# Patient Record
Sex: FEMALE | Race: WHITE | NOT HISPANIC OR LATINO | Employment: UNEMPLOYED | URBAN - METROPOLITAN AREA
[De-identification: names, ages, dates, MRNs, and addresses within clinical notes are randomized per-mention and may not be internally consistent; named-entity substitution may affect disease eponyms.]

---

## 2024-06-09 ENCOUNTER — HOSPITAL ENCOUNTER (INPATIENT)
Facility: MEDICAL CENTER | Age: 39
LOS: 4 days | DRG: 439 | End: 2024-06-13
Attending: STUDENT IN AN ORGANIZED HEALTH CARE EDUCATION/TRAINING PROGRAM | Admitting: STUDENT IN AN ORGANIZED HEALTH CARE EDUCATION/TRAINING PROGRAM
Payer: MEDICAID

## 2024-06-09 ENCOUNTER — DOCUMENTATION (OUTPATIENT)
Dept: HOSPITALIST | Facility: MEDICAL CENTER | Age: 39
End: 2024-06-09
Payer: MEDICAID

## 2024-06-09 DIAGNOSIS — R10.9 INTRACTABLE ABDOMINAL PAIN: ICD-10-CM

## 2024-06-09 PROBLEM — K82.8 GALLBLADDER SLUDGE: Status: ACTIVE | Noted: 2024-06-09

## 2024-06-09 PROBLEM — K81.0 ACUTE CHOLECYSTITIS: Status: RESOLVED | Noted: 2024-06-09 | Resolved: 2024-06-09

## 2024-06-09 PROBLEM — F10.10 ALCOHOL ABUSE: Status: ACTIVE | Noted: 2024-06-09

## 2024-06-09 PROBLEM — K81.0 ACUTE CHOLECYSTITIS: Status: ACTIVE | Noted: 2024-06-09

## 2024-06-09 PROBLEM — D69.6 THROMBOCYTOPENIA (HCC): Status: ACTIVE | Noted: 2024-06-09

## 2024-06-09 LAB
ALBUMIN SERPL BCP-MCNC: 3.2 G/DL (ref 3.2–4.9)
ALBUMIN/GLOB SERPL: 1 G/DL
ALP SERPL-CCNC: 182 U/L (ref 30–99)
ALT SERPL-CCNC: 36 U/L (ref 2–50)
ANION GAP SERPL CALC-SCNC: 16 MMOL/L (ref 7–16)
AST SERPL-CCNC: 269 U/L (ref 12–45)
BASOPHILS # BLD AUTO: 0.2 % (ref 0–1.8)
BASOPHILS # BLD: 0.01 K/UL (ref 0–0.12)
BILIRUB SERPL-MCNC: 1.6 MG/DL (ref 0.1–1.5)
BUN SERPL-MCNC: 4 MG/DL (ref 8–22)
CALCIUM ALBUM COR SERPL-MCNC: 9 MG/DL (ref 8.5–10.5)
CALCIUM SERPL-MCNC: 8.4 MG/DL (ref 8.5–10.5)
CHLORIDE SERPL-SCNC: 92 MMOL/L (ref 96–112)
CO2 SERPL-SCNC: 23 MMOL/L (ref 20–33)
CREAT SERPL-MCNC: 0.25 MG/DL (ref 0.5–1.4)
EOSINOPHIL # BLD AUTO: 0.02 K/UL (ref 0–0.51)
EOSINOPHIL NFR BLD: 0.5 % (ref 0–6.9)
ERYTHROCYTE [DISTWIDTH] IN BLOOD BY AUTOMATED COUNT: 48 FL (ref 35.9–50)
GFR SERPLBLD CREATININE-BSD FMLA CKD-EPI: 143 ML/MIN/1.73 M 2
GLOBULIN SER CALC-MCNC: 3.1 G/DL (ref 1.9–3.5)
GLUCOSE SERPL-MCNC: 139 MG/DL (ref 65–99)
HCT VFR BLD AUTO: 32.2 % (ref 37–47)
HGB BLD-MCNC: 10.8 G/DL (ref 12–16)
IMM GRANULOCYTES # BLD AUTO: 0.02 K/UL (ref 0–0.11)
IMM GRANULOCYTES NFR BLD AUTO: 0.5 % (ref 0–0.9)
LYMPHOCYTES # BLD AUTO: 0.56 K/UL (ref 1–4.8)
LYMPHOCYTES NFR BLD: 13 % (ref 22–41)
MAGNESIUM SERPL-MCNC: 1.1 MG/DL (ref 1.5–2.5)
MCH RBC QN AUTO: 33.3 PG (ref 27–33)
MCHC RBC AUTO-ENTMCNC: 33.5 G/DL (ref 32.2–35.5)
MCV RBC AUTO: 99.4 FL (ref 81.4–97.8)
MONOCYTES # BLD AUTO: 0.22 K/UL (ref 0–0.85)
MONOCYTES NFR BLD AUTO: 5.1 % (ref 0–13.4)
NEUTROPHILS # BLD AUTO: 3.49 K/UL (ref 1.82–7.42)
NEUTROPHILS NFR BLD: 80.7 % (ref 44–72)
NRBC # BLD AUTO: 0 K/UL
NRBC BLD-RTO: 0 /100 WBC (ref 0–0.2)
PLATELET # BLD AUTO: 19 K/UL (ref 164–446)
PLATELETS.RETICULATED NFR BLD AUTO: 13.2 % (ref 0.6–13.1)
PMV BLD AUTO: 11.8 FL (ref 9–12.9)
POTASSIUM SERPL-SCNC: 3.5 MMOL/L (ref 3.6–5.5)
PROCALCITONIN SERPL-MCNC: 0.29 NG/ML
PROT SERPL-MCNC: 6.3 G/DL (ref 6–8.2)
RBC # BLD AUTO: 3.24 M/UL (ref 4.2–5.4)
SODIUM SERPL-SCNC: 131 MMOL/L (ref 135–145)
WBC # BLD AUTO: 4.3 K/UL (ref 4.8–10.8)

## 2024-06-09 PROCEDURE — 87040 BLOOD CULTURE FOR BACTERIA: CPT

## 2024-06-09 PROCEDURE — 85025 COMPLETE CBC W/AUTO DIFF WBC: CPT

## 2024-06-09 PROCEDURE — 36415 COLL VENOUS BLD VENIPUNCTURE: CPT

## 2024-06-09 PROCEDURE — 83735 ASSAY OF MAGNESIUM: CPT

## 2024-06-09 PROCEDURE — 700105 HCHG RX REV CODE 258: Performed by: STUDENT IN AN ORGANIZED HEALTH CARE EDUCATION/TRAINING PROGRAM

## 2024-06-09 PROCEDURE — 85055 RETICULATED PLATELET ASSAY: CPT

## 2024-06-09 PROCEDURE — 84145 PROCALCITONIN (PCT): CPT

## 2024-06-09 PROCEDURE — 80053 COMPREHEN METABOLIC PANEL: CPT

## 2024-06-09 PROCEDURE — 770001 HCHG ROOM/CARE - MED/SURG/GYN PRIV*

## 2024-06-09 PROCEDURE — 99223 1ST HOSP IP/OBS HIGH 75: CPT | Performed by: STUDENT IN AN ORGANIZED HEALTH CARE EDUCATION/TRAINING PROGRAM

## 2024-06-09 PROCEDURE — 700111 HCHG RX REV CODE 636 W/ 250 OVERRIDE (IP): Performed by: STUDENT IN AN ORGANIZED HEALTH CARE EDUCATION/TRAINING PROGRAM

## 2024-06-09 RX ORDER — HYDROMORPHONE HYDROCHLORIDE 1 MG/ML
1 INJECTION, SOLUTION INTRAMUSCULAR; INTRAVENOUS; SUBCUTANEOUS EVERY 4 HOURS PRN
Status: DISCONTINUED | OUTPATIENT
Start: 2024-06-09 | End: 2024-06-10

## 2024-06-09 RX ORDER — SODIUM CHLORIDE 9 MG/ML
INJECTION, SOLUTION INTRAVENOUS CONTINUOUS
Status: DISCONTINUED | OUTPATIENT
Start: 2024-06-09 | End: 2024-06-09

## 2024-06-09 RX ORDER — ONDANSETRON 4 MG/1
4 TABLET, ORALLY DISINTEGRATING ORAL EVERY 4 HOURS PRN
Status: DISCONTINUED | OUTPATIENT
Start: 2024-06-09 | End: 2024-06-13 | Stop reason: HOSPADM

## 2024-06-09 RX ORDER — DIAZEPAM 5 MG/ML
10 INJECTION, SOLUTION INTRAMUSCULAR; INTRAVENOUS
Status: DISCONTINUED | OUTPATIENT
Start: 2024-06-09 | End: 2024-06-13 | Stop reason: HOSPADM

## 2024-06-09 RX ORDER — SODIUM CHLORIDE, SODIUM LACTATE, POTASSIUM CHLORIDE, CALCIUM CHLORIDE 600; 310; 30; 20 MG/100ML; MG/100ML; MG/100ML; MG/100ML
INJECTION, SOLUTION INTRAVENOUS CONTINUOUS
Status: DISCONTINUED | OUTPATIENT
Start: 2024-06-09 | End: 2024-06-13 | Stop reason: HOSPADM

## 2024-06-09 RX ORDER — ONDANSETRON 2 MG/ML
4 INJECTION INTRAMUSCULAR; INTRAVENOUS EVERY 4 HOURS PRN
Status: DISCONTINUED | OUTPATIENT
Start: 2024-06-09 | End: 2024-06-13 | Stop reason: HOSPADM

## 2024-06-09 RX ORDER — LORAZEPAM 1 MG/1
4 TABLET ORAL
Status: DISCONTINUED | OUTPATIENT
Start: 2024-06-09 | End: 2024-06-13 | Stop reason: HOSPADM

## 2024-06-09 RX ORDER — PROMETHAZINE HYDROCHLORIDE 25 MG/1
12.5-25 TABLET ORAL EVERY 4 HOURS PRN
Status: DISCONTINUED | OUTPATIENT
Start: 2024-06-09 | End: 2024-06-13 | Stop reason: HOSPADM

## 2024-06-09 RX ORDER — PROCHLORPERAZINE EDISYLATE 5 MG/ML
5-10 INJECTION INTRAMUSCULAR; INTRAVENOUS EVERY 4 HOURS PRN
Status: DISCONTINUED | OUTPATIENT
Start: 2024-06-09 | End: 2024-06-13 | Stop reason: HOSPADM

## 2024-06-09 RX ORDER — LORAZEPAM 0.5 MG/1
0.5 TABLET ORAL EVERY 4 HOURS PRN
Status: DISCONTINUED | OUTPATIENT
Start: 2024-06-09 | End: 2024-06-13 | Stop reason: HOSPADM

## 2024-06-09 RX ORDER — LORAZEPAM 1 MG/1
3 TABLET ORAL
Status: DISCONTINUED | OUTPATIENT
Start: 2024-06-09 | End: 2024-06-13 | Stop reason: HOSPADM

## 2024-06-09 RX ORDER — MORPHINE SULFATE 4 MG/ML
4 INJECTION INTRAVENOUS EVERY 4 HOURS PRN
Status: DISCONTINUED | OUTPATIENT
Start: 2024-06-09 | End: 2024-06-10

## 2024-06-09 RX ORDER — LORAZEPAM 1 MG/1
1 TABLET ORAL EVERY 4 HOURS PRN
Status: DISCONTINUED | OUTPATIENT
Start: 2024-06-09 | End: 2024-06-13 | Stop reason: HOSPADM

## 2024-06-09 RX ORDER — PROMETHAZINE HYDROCHLORIDE 25 MG/1
12.5-25 SUPPOSITORY RECTAL EVERY 4 HOURS PRN
Status: DISCONTINUED | OUTPATIENT
Start: 2024-06-09 | End: 2024-06-13 | Stop reason: HOSPADM

## 2024-06-09 RX ORDER — LORAZEPAM 1 MG/1
2 TABLET ORAL
Status: DISCONTINUED | OUTPATIENT
Start: 2024-06-09 | End: 2024-06-13 | Stop reason: HOSPADM

## 2024-06-09 RX ADMIN — ONDANSETRON 4 MG: 2 INJECTION INTRAMUSCULAR; INTRAVENOUS at 21:58

## 2024-06-09 RX ADMIN — SODIUM CHLORIDE, POTASSIUM CHLORIDE, SODIUM LACTATE AND CALCIUM CHLORIDE: 600; 310; 30; 20 INJECTION, SOLUTION INTRAVENOUS at 21:56

## 2024-06-09 RX ADMIN — MORPHINE SULFATE 4 MG: 4 INJECTION INTRAVENOUS at 21:56

## 2024-06-09 SDOH — ECONOMIC STABILITY: TRANSPORTATION INSECURITY
IN THE PAST 12 MONTHS, HAS LACK OF RELIABLE TRANSPORTATION KEPT YOU FROM MEDICAL APPOINTMENTS, MEETINGS, WORK OR FROM GETTING THINGS NEEDED FOR DAILY LIVING?: PATIENT DECLINED

## 2024-06-09 SDOH — ECONOMIC STABILITY: TRANSPORTATION INSECURITY
IN THE PAST 12 MONTHS, HAS THE LACK OF TRANSPORTATION KEPT YOU FROM MEDICAL APPOINTMENTS OR FROM GETTING MEDICATIONS?: PATIENT DECLINED

## 2024-06-09 ASSESSMENT — COGNITIVE AND FUNCTIONAL STATUS - GENERAL
CLIMB 3 TO 5 STEPS WITH RAILING: A LITTLE
SUGGESTED CMS G CODE MODIFIER MOBILITY: CJ
DRESSING REGULAR LOWER BODY CLOTHING: A LITTLE
HELP NEEDED FOR BATHING: A LITTLE
SUGGESTED CMS G CODE MODIFIER DAILY ACTIVITY: CJ
DAILY ACTIVITIY SCORE: 21
MOBILITY SCORE: 22
WALKING IN HOSPITAL ROOM: A LITTLE
TOILETING: A LITTLE

## 2024-06-09 ASSESSMENT — SOCIAL DETERMINANTS OF HEALTH (SDOH)
IN THE PAST 12 MONTHS, HAS THE ELECTRIC, GAS, OIL, OR WATER COMPANY THREATENED TO SHUT OFF SERVICE IN YOUR HOME?: PATIENT DECLINED
WITHIN THE LAST YEAR, HAVE YOU BEEN KICKED, HIT, SLAPPED, OR OTHERWISE PHYSICALLY HURT BY YOUR PARTNER OR EX-PARTNER?: PATIENT DECLINED
WITHIN THE LAST YEAR, HAVE YOU BEEN HUMILIATED OR EMOTIONALLY ABUSED IN OTHER WAYS BY YOUR PARTNER OR EX-PARTNER?: PATIENT DECLINED
WITHIN THE LAST YEAR, HAVE YOU BEEN AFRAID OF YOUR PARTNER OR EX-PARTNER?: PATIENT DECLINED
WITHIN THE LAST YEAR, HAVE TO BEEN RAPED OR FORCED TO HAVE ANY KIND OF SEXUAL ACTIVITY BY YOUR PARTNER OR EX-PARTNER?: PATIENT DECLINED
WITHIN THE PAST 12 MONTHS, THE FOOD YOU BOUGHT JUST DIDN'T LAST AND YOU DIDN'T HAVE MONEY TO GET MORE: PATIENT DECLINED
WITHIN THE PAST 12 MONTHS, YOU WORRIED THAT YOUR FOOD WOULD RUN OUT BEFORE YOU GOT THE MONEY TO BUY MORE: PATIENT DECLINED

## 2024-06-09 ASSESSMENT — LIFESTYLE VARIABLES
HOW MANY TIMES IN THE PAST YEAR HAVE YOU HAD 5 OR MORE DRINKS IN A DAY: 350
TOTAL SCORE: 2
AVERAGE NUMBER OF DAYS PER WEEK YOU HAVE A DRINK CONTAINING ALCOHOL: 7
NAUSEA AND VOMITING: MILD NAUSEA WITH NO VOMITING
TOTAL SCORE: 2
HEADACHE, FULLNESS IN HEAD: NOT PRESENT
TOTAL SCORE: 2
ALCOHOL_USE: YES
DOES PATIENT WANT TO STOP DRINKING: NO
EVER HAD A DRINK FIRST THING IN THE MORNING TO STEADY YOUR NERVES TO GET RID OF A HANGOVER: YES
PAROXYSMAL SWEATS: NO SWEAT VISIBLE
CONSUMPTION TOTAL: POSITIVE
HAVE PEOPLE ANNOYED YOU BY CRITICIZING YOUR DRINKING: NO
TREMOR: NO TREMOR
AGITATION: NORMAL ACTIVITY
ON A TYPICAL DAY WHEN YOU DRINK ALCOHOL HOW MANY DRINKS DO YOU HAVE: 4
TOTAL SCORE: 2
VISUAL DISTURBANCES: NOT PRESENT
HAVE YOU EVER FELT YOU SHOULD CUT DOWN ON YOUR DRINKING: NO
AUDITORY DISTURBANCES: NOT PRESENT
ORIENTATION AND CLOUDING OF SENSORIUM: ORIENTED AND CAN DO SERIAL ADDITIONS
EVER FELT BAD OR GUILTY ABOUT YOUR DRINKING: YES
ANXIETY: MILDLY ANXIOUS

## 2024-06-09 ASSESSMENT — ENCOUNTER SYMPTOMS
RESPIRATORY NEGATIVE: 1
EYES NEGATIVE: 1
NEUROLOGICAL NEGATIVE: 1
PSYCHIATRIC NEGATIVE: 1
VOMITING: 1
CONSTITUTIONAL NEGATIVE: 1
MUSCULOSKELETAL NEGATIVE: 1
ABDOMINAL PAIN: 1
NAUSEA: 1
CARDIOVASCULAR NEGATIVE: 1

## 2024-06-09 ASSESSMENT — PAIN DESCRIPTION - PAIN TYPE
TYPE: ACUTE PAIN
TYPE: ACUTE PAIN

## 2024-06-09 ASSESSMENT — PATIENT HEALTH QUESTIONNAIRE - PHQ9
SUM OF ALL RESPONSES TO PHQ9 QUESTIONS 1 AND 2: 0
1. LITTLE INTEREST OR PLEASURE IN DOING THINGS: NOT AT ALL
2. FEELING DOWN, DEPRESSED, IRRITABLE, OR HOPELESS: NOT AT ALL

## 2024-06-09 NOTE — PROGRESS NOTES
[facility] DIRECT ADMISSION REPORT    Transferring facility: [facility]    Transferring physician: Dr. Talamatnes    Chief complaint: Abdominal Pain    Pertinent history & patient course: 39F with chronic pancreatitis from EtOH presented with abdominal pain. Discussed with general surgery, recommended nonoperative management from and started ceftriaxone/flagyl. Well-known to physician.    Pertinent imaging & lab results: Lipase 572. Hgb 11.8, Plt 18K. Tbili 2.2, . CT A/P demonstrates distended gallbladder with wall thickening. Abdominal US CBD 5.4 mm, wall thickening 3.9, contains sludge.    Consultants called prior to transfer and pertinent input from consultants: None    Code Status: FULL per transferring provider, I personally verified with the transferring provider patient's code status and the transferring provider has confirmed this with the patient.    Reason for Transfer: Limited platelet transfusions, 1 unit not available until tomorrow, unable to do smears for pseudothrombocytopenia, may warrant IR placement for acalculous cholecystitis    Further work up or recommendations requested prior to transfer: None    Patient accepted for transfer: Yes    Accepting [facility] Facility: [facility] - Nursing to notify the Triage Coordinator in the RTOC via Voalte or Phone ext. [phone] when patient arrives to the unit. The Triage Coordinator will assign the admitting provider.    Consultants to be called upon arrival: None    Admission status: Inpatient.     Floor requested: GSU    If ICU transfer, name of intensivist case discussed with and pertinent input from critical care: N/A    The admitting provider is the point of contact for questions or concerns regarding patient's care.

## 2024-06-09 NOTE — PROGRESS NOTES
[facility] DIRECT ADMISSION REPORT    Transferring facility: [facility] [place], operated by [facility]    Transferring physician: Dr. Talamantes    Chief complaint: Abdominal Pain    Pertinent history & patient course: 39F with chronic pancreatitis from EtOH presented with abdominal pain. Discussed with general surgery, recommended nonoperative management from and started ceftriaxone/flagyl. Well-known to physician.    Pertinent imaging & lab results: Lipase 572. Hgb 11.8, Plt 18K. Tbili 2.2, . CT A/P demonstrates distended gallbladder with wall thickening. Abdominal US CBD 5.4 mm, wall thickening 3.9, contains sludge.    Consultants called prior to transfer and pertinent input from consultants: None    Code Status: FULL per transferring provider, I personally verified with the transferring provider patient's code status and the transferring provider has confirmed this with the patient.    Reason for Transfer: Limited platelet transfusions, 1 unit not available until tomorrow, unable to do smears for pseudothrombocytopenia, may warrant IR placement for acalculous cholecystitis    Further work up or recommendations requested prior to transfer: None    Patient accepted for transfer: Yes    Accepting [facility] Facility: [facility] - Nursing to notify the Triage Coordinator in the RTOC via Voalte or Phone ext. [phone] when patient arrives to the unit. The Triage Coordinator will assign the admitting provider.    Consultants to be called upon arrival: None    Admission status: Inpatient.     Floor requested: GSU    If ICU transfer, name of intensivist case discussed with and pertinent input from critical care: N/A    The admitting provider is the point of contact for questions or concerns regarding patient's care.    This encounter is for documentation purposes of the transfer request. A copy has been placed in the pending hospital encounter.

## 2024-06-10 ENCOUNTER — APPOINTMENT (OUTPATIENT)
Dept: RADIOLOGY | Facility: MEDICAL CENTER | Age: 39
DRG: 439 | End: 2024-06-10
Attending: STUDENT IN AN ORGANIZED HEALTH CARE EDUCATION/TRAINING PROGRAM
Payer: MEDICAID

## 2024-06-10 PROBLEM — F15.10 METHAMPHETAMINE ABUSE (HCC): Status: ACTIVE | Noted: 2024-06-10

## 2024-06-10 PROBLEM — F10.20 UNCOMPLICATED ALCOHOL DEPENDENCE (HCC): Status: ACTIVE | Noted: 2024-06-09

## 2024-06-10 PROBLEM — R10.9 INTRACTABLE ABDOMINAL PAIN: Status: ACTIVE | Noted: 2024-06-10

## 2024-06-10 LAB
ALBUMIN SERPL BCP-MCNC: 3.3 G/DL (ref 3.2–4.9)
ALBUMIN SERPL BCP-MCNC: 3.3 G/DL (ref 3.2–4.9)
ALBUMIN/GLOB SERPL: 1 G/DL
ALBUMIN/GLOB SERPL: 1.1 G/DL
ALP SERPL-CCNC: 169 U/L (ref 30–99)
ALP SERPL-CCNC: 170 U/L (ref 30–99)
ALT SERPL-CCNC: 35 U/L (ref 2–50)
ALT SERPL-CCNC: 37 U/L (ref 2–50)
ANION GAP SERPL CALC-SCNC: 17 MMOL/L (ref 7–16)
ANION GAP SERPL CALC-SCNC: 19 MMOL/L (ref 7–16)
AST SERPL-CCNC: 196 U/L (ref 12–45)
AST SERPL-CCNC: 201 U/L (ref 12–45)
BASOPHILS # BLD AUTO: 0.2 % (ref 0–1.8)
BASOPHILS # BLD: 0.01 K/UL (ref 0–0.12)
BILIRUB SERPL-MCNC: 1.2 MG/DL (ref 0.1–1.5)
BILIRUB SERPL-MCNC: 1.3 MG/DL (ref 0.1–1.5)
BUN SERPL-MCNC: 2 MG/DL (ref 8–22)
BUN SERPL-MCNC: 2 MG/DL (ref 8–22)
CALCIUM ALBUM COR SERPL-MCNC: 9 MG/DL (ref 8.5–10.5)
CALCIUM ALBUM COR SERPL-MCNC: 9 MG/DL (ref 8.5–10.5)
CALCIUM SERPL-MCNC: 8.4 MG/DL (ref 8.5–10.5)
CALCIUM SERPL-MCNC: 8.4 MG/DL (ref 8.5–10.5)
CHLORIDE SERPL-SCNC: 88 MMOL/L (ref 96–112)
CHLORIDE SERPL-SCNC: 89 MMOL/L (ref 96–112)
CO2 SERPL-SCNC: 23 MMOL/L (ref 20–33)
CO2 SERPL-SCNC: 25 MMOL/L (ref 20–33)
CREAT SERPL-MCNC: 0.2 MG/DL (ref 0.5–1.4)
CREAT SERPL-MCNC: 0.22 MG/DL (ref 0.5–1.4)
EOSINOPHIL # BLD AUTO: 0.04 K/UL (ref 0–0.51)
EOSINOPHIL NFR BLD: 0.9 % (ref 0–6.9)
ERYTHROCYTE [DISTWIDTH] IN BLOOD BY AUTOMATED COUNT: 45.2 FL (ref 35.9–50)
ERYTHROCYTE [DISTWIDTH] IN BLOOD BY AUTOMATED COUNT: 46 FL (ref 35.9–50)
GFR SERPLBLD CREATININE-BSD FMLA CKD-EPI: 147 ML/MIN/1.73 M 2
GFR SERPLBLD CREATININE-BSD FMLA CKD-EPI: 150 ML/MIN/1.73 M 2
GLOBULIN SER CALC-MCNC: 3 G/DL (ref 1.9–3.5)
GLOBULIN SER CALC-MCNC: 3.2 G/DL (ref 1.9–3.5)
GLUCOSE SERPL-MCNC: 114 MG/DL (ref 65–99)
GLUCOSE SERPL-MCNC: 142 MG/DL (ref 65–99)
HBV CORE AB SERPL QL IA: NONREACTIVE
HBV SURFACE AB SERPL IA-ACNC: <3.5 MIU/ML (ref 0–10)
HBV SURFACE AG SER QL: ABNORMAL
HCT VFR BLD AUTO: 31.5 % (ref 37–47)
HCT VFR BLD AUTO: 33.4 % (ref 37–47)
HCV AB SER QL: ABNORMAL
HGB BLD-MCNC: 11.2 G/DL (ref 12–16)
HGB BLD-MCNC: 11.5 G/DL (ref 12–16)
HIV 1+2 AB+HIV1 P24 AG SERPL QL IA: ABNORMAL
IMM GRANULOCYTES # BLD AUTO: 0.02 K/UL (ref 0–0.11)
IMM GRANULOCYTES NFR BLD AUTO: 0.4 % (ref 0–0.9)
LIPASE SERPL-CCNC: 154 U/L (ref 11–82)
LYMPHOCYTES # BLD AUTO: 0.89 K/UL (ref 1–4.8)
LYMPHOCYTES NFR BLD: 19.2 % (ref 22–41)
MCH RBC QN AUTO: 33 PG (ref 27–33)
MCH RBC QN AUTO: 33.9 PG (ref 27–33)
MCHC RBC AUTO-ENTMCNC: 34.4 G/DL (ref 32.2–35.5)
MCHC RBC AUTO-ENTMCNC: 35.6 G/DL (ref 32.2–35.5)
MCV RBC AUTO: 95.5 FL (ref 81.4–97.8)
MCV RBC AUTO: 96 FL (ref 81.4–97.8)
MONOCYTES # BLD AUTO: 0.32 K/UL (ref 0–0.85)
MONOCYTES NFR BLD AUTO: 6.9 % (ref 0–13.4)
NEUTROPHILS # BLD AUTO: 3.35 K/UL (ref 1.82–7.42)
NEUTROPHILS NFR BLD: 72.4 % (ref 44–72)
NRBC # BLD AUTO: 0 K/UL
NRBC BLD-RTO: 0 /100 WBC (ref 0–0.2)
PLATELET # BLD AUTO: 21 K/UL (ref 164–446)
PLATELET # BLD AUTO: 23 K/UL (ref 164–446)
PLATELETS.RETICULATED NFR BLD AUTO: 15.9 % (ref 0.6–13.1)
PLATELETS.RETICULATED NFR BLD AUTO: 16.4 % (ref 0.6–13.1)
PMV BLD AUTO: 11.3 FL (ref 9–12.9)
PMV BLD AUTO: 12 FL (ref 9–12.9)
POTASSIUM SERPL-SCNC: 3.3 MMOL/L (ref 3.6–5.5)
POTASSIUM SERPL-SCNC: 3.8 MMOL/L (ref 3.6–5.5)
PROT SERPL-MCNC: 6.3 G/DL (ref 6–8.2)
PROT SERPL-MCNC: 6.5 G/DL (ref 6–8.2)
RBC # BLD AUTO: 3.3 M/UL (ref 4.2–5.4)
RBC # BLD AUTO: 3.48 M/UL (ref 4.2–5.4)
SODIUM SERPL-SCNC: 130 MMOL/L (ref 135–145)
SODIUM SERPL-SCNC: 131 MMOL/L (ref 135–145)
WBC # BLD AUTO: 4.6 K/UL (ref 4.8–10.8)
WBC # BLD AUTO: 5.2 K/UL (ref 4.8–10.8)

## 2024-06-10 PROCEDURE — 99233 SBSQ HOSP IP/OBS HIGH 50: CPT | Performed by: STUDENT IN AN ORGANIZED HEALTH CARE EDUCATION/TRAINING PROGRAM

## 2024-06-10 PROCEDURE — 700102 HCHG RX REV CODE 250 W/ 637 OVERRIDE(OP): Performed by: STUDENT IN AN ORGANIZED HEALTH CARE EDUCATION/TRAINING PROGRAM

## 2024-06-10 PROCEDURE — 86803 HEPATITIS C AB TEST: CPT

## 2024-06-10 PROCEDURE — 700111 HCHG RX REV CODE 636 W/ 250 OVERRIDE (IP): Performed by: STUDENT IN AN ORGANIZED HEALTH CARE EDUCATION/TRAINING PROGRAM

## 2024-06-10 PROCEDURE — 86704 HEP B CORE ANTIBODY TOTAL: CPT

## 2024-06-10 PROCEDURE — 80053 COMPREHEN METABOLIC PANEL: CPT

## 2024-06-10 PROCEDURE — 770001 HCHG ROOM/CARE - MED/SURG/GYN PRIV*

## 2024-06-10 PROCEDURE — 86706 HEP B SURFACE ANTIBODY: CPT

## 2024-06-10 PROCEDURE — 85025 COMPLETE CBC W/AUTO DIFF WBC: CPT

## 2024-06-10 PROCEDURE — 85055 RETICULATED PLATELET ASSAY: CPT

## 2024-06-10 PROCEDURE — A9270 NON-COVERED ITEM OR SERVICE: HCPCS | Performed by: STUDENT IN AN ORGANIZED HEALTH CARE EDUCATION/TRAINING PROGRAM

## 2024-06-10 PROCEDURE — 87389 HIV-1 AG W/HIV-1&-2 AB AG IA: CPT

## 2024-06-10 PROCEDURE — 36415 COLL VENOUS BLD VENIPUNCTURE: CPT

## 2024-06-10 PROCEDURE — 85027 COMPLETE CBC AUTOMATED: CPT

## 2024-06-10 PROCEDURE — 83690 ASSAY OF LIPASE: CPT

## 2024-06-10 PROCEDURE — 87340 HEPATITIS B SURFACE AG IA: CPT

## 2024-06-10 RX ORDER — OXYCODONE HYDROCHLORIDE 10 MG/1
10 TABLET ORAL EVERY 6 HOURS
Status: DISCONTINUED | OUTPATIENT
Start: 2024-06-10 | End: 2024-06-10

## 2024-06-10 RX ORDER — ACETAMINOPHEN 500 MG
1000 TABLET ORAL EVERY 6 HOURS
Status: DISCONTINUED | OUTPATIENT
Start: 2024-06-10 | End: 2024-06-13 | Stop reason: HOSPADM

## 2024-06-10 RX ORDER — ENOXAPARIN SODIUM 100 MG/ML
40 INJECTION SUBCUTANEOUS DAILY
Status: DISCONTINUED | OUTPATIENT
Start: 2024-06-10 | End: 2024-06-10

## 2024-06-10 RX ORDER — MAGNESIUM SULFATE HEPTAHYDRATE 40 MG/ML
4 INJECTION, SOLUTION INTRAVENOUS ONCE
Status: COMPLETED | OUTPATIENT
Start: 2024-06-10 | End: 2024-06-10

## 2024-06-10 RX ORDER — OXYCODONE HYDROCHLORIDE 10 MG/1
10 TABLET ORAL EVERY 4 HOURS PRN
Status: DISCONTINUED | OUTPATIENT
Start: 2024-06-10 | End: 2024-06-13 | Stop reason: HOSPADM

## 2024-06-10 RX ORDER — HYDROMORPHONE HYDROCHLORIDE 1 MG/ML
1 INJECTION, SOLUTION INTRAMUSCULAR; INTRAVENOUS; SUBCUTANEOUS EVERY 4 HOURS PRN
Status: DISCONTINUED | OUTPATIENT
Start: 2024-06-10 | End: 2024-06-11

## 2024-06-10 RX ORDER — IBUPROFEN 200 MG
200 TABLET ORAL EVERY 8 HOURS PRN
Status: ON HOLD | COMMUNITY
End: 2024-06-12

## 2024-06-10 RX ORDER — POTASSIUM CHLORIDE 20 MEQ/1
40 TABLET, EXTENDED RELEASE ORAL EVERY 6 HOURS
Status: COMPLETED | OUTPATIENT
Start: 2024-06-10 | End: 2024-06-10

## 2024-06-10 RX ADMIN — HYDROMORPHONE HYDROCHLORIDE 1 MG: 1 INJECTION, SOLUTION INTRAMUSCULAR; INTRAVENOUS; SUBCUTANEOUS at 00:06

## 2024-06-10 RX ADMIN — OXYCODONE HYDROCHLORIDE 10 MG: 10 TABLET ORAL at 22:32

## 2024-06-10 RX ADMIN — POTASSIUM CHLORIDE 40 MEQ: 1500 TABLET, EXTENDED RELEASE ORAL at 08:56

## 2024-06-10 RX ADMIN — ONDANSETRON 4 MG: 2 INJECTION INTRAMUSCULAR; INTRAVENOUS at 14:39

## 2024-06-10 RX ADMIN — ACETAMINOPHEN 1000 MG: 500 TABLET, FILM COATED ORAL at 23:08

## 2024-06-10 RX ADMIN — ONDANSETRON 4 MG: 4 TABLET, ORALLY DISINTEGRATING ORAL at 23:08

## 2024-06-10 RX ADMIN — OXYCODONE HYDROCHLORIDE 10 MG: 10 TABLET ORAL at 14:34

## 2024-06-10 RX ADMIN — HYDROMORPHONE HYDROCHLORIDE 1 MG: 1 INJECTION, SOLUTION INTRAMUSCULAR; INTRAVENOUS; SUBCUTANEOUS at 05:19

## 2024-06-10 RX ADMIN — MORPHINE SULFATE 4 MG: 4 INJECTION INTRAVENOUS at 03:27

## 2024-06-10 RX ADMIN — OXYCODONE HYDROCHLORIDE 10 MG: 10 TABLET ORAL at 18:39

## 2024-06-10 RX ADMIN — POTASSIUM CHLORIDE 40 MEQ: 1500 TABLET, EXTENDED RELEASE ORAL at 14:34

## 2024-06-10 RX ADMIN — MAGNESIUM SULFATE HEPTAHYDRATE 4 G: 4 INJECTION, SOLUTION INTRAVENOUS at 11:40

## 2024-06-10 ASSESSMENT — LIFESTYLE VARIABLES
TOTAL SCORE: 1
AGITATION: NORMAL ACTIVITY
TOTAL SCORE: 2
AUDITORY DISTURBANCES: NOT PRESENT
VISUAL DISTURBANCES: NOT PRESENT
AGITATION: NORMAL ACTIVITY
NAUSEA AND VOMITING: MILD NAUSEA WITH NO VOMITING
AGITATION: NORMAL ACTIVITY
ORIENTATION AND CLOUDING OF SENSORIUM: ORIENTED AND CAN DO SERIAL ADDITIONS
AGITATION: NORMAL ACTIVITY
TREMOR: NO TREMOR
ORIENTATION AND CLOUDING OF SENSORIUM: ORIENTED AND CAN DO SERIAL ADDITIONS
TOTAL SCORE: 4
VISUAL DISTURBANCES: NOT PRESENT
AUDITORY DISTURBANCES: NOT PRESENT
HEADACHE, FULLNESS IN HEAD: NOT PRESENT
TREMOR: TREMOR NOT VISIBLE BUT CAN BE FELT, FINGERTIP TO FINGERTIP
PAROXYSMAL SWEATS: NO SWEAT VISIBLE
TREMOR: TREMOR NOT VISIBLE BUT CAN BE FELT, FINGERTIP TO FINGERTIP
TREMOR: TREMOR NOT VISIBLE BUT CAN BE FELT, FINGERTIP TO FINGERTIP
PAROXYSMAL SWEATS: NO SWEAT VISIBLE
ORIENTATION AND CLOUDING OF SENSORIUM: ORIENTED AND CAN DO SERIAL ADDITIONS
ANXIETY: MILDLY ANXIOUS
ANXIETY: MILDLY ANXIOUS
NAUSEA AND VOMITING: MILD NAUSEA WITH NO VOMITING
AUDITORY DISTURBANCES: NOT PRESENT
VISUAL DISTURBANCES: NOT PRESENT
ORIENTATION AND CLOUDING OF SENSORIUM: ORIENTED AND CAN DO SERIAL ADDITIONS
HEADACHE, FULLNESS IN HEAD: NOT PRESENT
TOTAL SCORE: 4
HEADACHE, FULLNESS IN HEAD: VERY MILD
TOTAL SCORE: 4
PAROXYSMAL SWEATS: NO SWEAT VISIBLE
PAROXYSMAL SWEATS: BARELY PERCEPTIBLE SWEATING. PALMS MOIST
PAROXYSMAL SWEATS: NO SWEAT VISIBLE
NAUSEA AND VOMITING: NO NAUSEA AND NO VOMITING
NAUSEA AND VOMITING: NO NAUSEA AND NO VOMITING
TREMOR: TREMOR NOT VISIBLE BUT CAN BE FELT, FINGERTIP TO FINGERTIP
ANXIETY: MILDLY ANXIOUS
AUDITORY DISTURBANCES: NOT PRESENT
HEADACHE, FULLNESS IN HEAD: VERY MILD
VISUAL DISTURBANCES: NOT PRESENT
NAUSEA AND VOMITING: NO NAUSEA AND NO VOMITING
AGITATION: NORMAL ACTIVITY
ORIENTATION AND CLOUDING OF SENSORIUM: ORIENTED AND CAN DO SERIAL ADDITIONS
ANXIETY: MILDLY ANXIOUS
HEADACHE, FULLNESS IN HEAD: VERY MILD
VISUAL DISTURBANCES: NOT PRESENT
AUDITORY DISTURBANCES: NOT PRESENT
ANXIETY: MILDLY ANXIOUS

## 2024-06-10 ASSESSMENT — ENCOUNTER SYMPTOMS
DIZZINESS: 0
ABDOMINAL PAIN: 1
NAUSEA: 0
CHILLS: 0
VOMITING: 0
CONSTIPATION: 0
COUGH: 0
PALPITATIONS: 0
MYALGIAS: 0
FEVER: 0
DIARRHEA: 0
HEADACHES: 0
SHORTNESS OF BREATH: 0

## 2024-06-10 ASSESSMENT — PAIN DESCRIPTION - PAIN TYPE
TYPE: ACUTE PAIN

## 2024-06-10 NOTE — PROGRESS NOTES
Medication history reviewed with PT at bedside    [facility] is complete per PT reporting    Allergies reviewed.     Patient denies any outpatient antibiotics in the last 30 days.     Patient is not taking anticoagulants.    Preferred pharmacy for this visit - [facility] in [place] ([phone])

## 2024-06-10 NOTE — ASSESSMENT & PLAN NOTE
Drinks half a pint of liquor a day, last alcoholic beverage about 3 days ago  [facility] protocol   cessation.  Referred back to Alcoholics Anonymous.

## 2024-06-10 NOTE — ASSESSMENT & PLAN NOTE
With abdominal pain and elevated lipase 154  Continue advance diet as tolerated, IV fluid  Pain control  Labs daily  Worsening elevated liver enzymes and bilirubin, will order CT scan  Order ultrasound for gallbladder and evaluate for any stones.

## 2024-06-10 NOTE — PROGRESS NOTES
Virtual Nurse rounding complete.    Round Needs: Other: patient requesting to have HIDA scan sooner than 1300. Education provided about imaging schedule and availability and Notified of Patient Needs: primary RN.

## 2024-06-10 NOTE — CARE PLAN
The patient is Watcher - Medium risk of patient condition declining or worsening    Shift Goals  Clinical Goals: pain mgmt  Patient Goals: pain mgmt    Progress made toward(s) clinical / shift goals:    Problem: Knowledge Deficit - Standard  Goal: Patient and family/care givers will demonstrate understanding of plan of care, disease process/condition, diagnostic tests and medications  Outcome: Progressing  Educate patient on plan of care and pending HIDA scan. Encouraged pt to ask questions.

## 2024-06-10 NOTE — H&P
Hospital Medicine History & Physical Note    Date of Service  6/9/2024    Primary Care Physician  Christian German D.O.    Consultants  None    Code Status  Full Code    Chief Complaint  Pancreatitis    History of Presenting Illness  Brenda Cartagena is a 39 y.o. female who presented 6/9/2024 with abdominal pain.  Patient has history of heavy alcohol use and frequent methamphetamine use.  She has been drinking half a pint of vodka every day.  She last used methamphetamine 1 week ago and last drank alcohol 3 days ago.  About 2 days ago, patient began to have a squeezing epigastric abdominal pain associate with nausea and multiple episodes of vomiting.  She denies diarrhea fever chills.    At outside facility:  White blood cell count 4.1 hemoglobin 11 platelet 12   ALT 60 creatinine 0.6  Alcohol level 210  Throughout hospital course, hemoglobin has been trending down 11.8 -> 10 -> 9.5  Right upper quadrant ultrasound showing distended gallbladder with a large amount of sludge.  The wall of the gallbladder is mildly prominent at 3.9 mm and early acute cholecystitis cannot be excluded.  U tox positive for amphetamines  CTAP showing fatty, enlarged liver    I discussed the plan of care with patient.    Review of Systems  Review of Systems   Constitutional: Negative.    HENT: Negative.     Eyes: Negative.    Respiratory: Negative.     Cardiovascular: Negative.    Gastrointestinal:  Positive for abdominal pain, nausea and vomiting.   Genitourinary: Negative.    Musculoskeletal: Negative.    Skin: Negative.    Neurological: Negative.    Psychiatric/Behavioral: Negative.         Past Medical History   has a past medical history of Indigestion (13 yrears).    Surgical History   has a past surgical history that includes incision and drainage orthopedic (5/9/2012); external fixator application (5/9/2012); tibia nailing intramedullary (5/11/2012); external fixator removal (5/11/2012); fasciotomy (5/13/2012); wound closure  delayed (5/16/2012); irrigation & debridement ortho (5/18/2012); wound closure ortho (5/18/2012); orif, fracture, tibia (9/5/2012); and tonsillectomy (Bilateral, 7/21/2015).     Family History  family history is not on file.   Family history reviewed with patient. There is no family history that is pertinent to the chief complaint.     Social History   reports that she has been smoking cigarettes. She has a 1 pack-year smoking history. She does not have any smokeless tobacco history on file. She reports current alcohol use. She reports current drug use. Drug: Inhaled.    Allergies  Allergies   Allergen Reactions    Peanut-Derived Swelling       Medications  Prior to Admission Medications   Prescriptions Last Dose Informant Patient Reported? Taking?   ondansetron (ZOFRAN) 4 MG TABS   No No   Sig: Take 1 Tab by mouth every four hours as needed for Nausea/Vomiting.   oxycodone-acetaminophen (PERCOCET) 5-325 MG TABS   No No   Sig: Take 1-2 Tabs by mouth every 6 hours as needed.      Facility-Administered Medications: None       Physical Exam  Temp:  [36.8 °C (98.2 °F)] 36.8 °C (98.2 °F)  Pulse:  [97] 97  Resp:  [18] 18  BP: (164)/(104) 164/104  SpO2:  [100 %] 100 %  Blood Pressure: (!) 164/104   Temperature: 36.8 °C (98.2 °F)   Pulse: 97   Respiration: 18   Pulse Oximetry: 100 %       Physical Exam  Constitutional:       Appearance: Normal appearance. She is normal weight.   HENT:      Head: Normocephalic.      Nose: Nose normal.      Mouth/Throat:      Mouth: Mucous membranes are moist.   Eyes:      Pupils: Pupils are equal, round, and reactive to light.   Cardiovascular:      Rate and Rhythm: Normal rate and regular rhythm.      Pulses: Normal pulses.   Pulmonary:      Effort: Pulmonary effort is normal.      Breath sounds: Normal breath sounds.   Abdominal:      General: Abdomen is flat. Bowel sounds are normal.      Palpations: Abdomen is soft.      Comments: Patient's pain upon palpation of epigastric area  Ignacio  "sign negative   Musculoskeletal:      Cervical back: Neck supple.   Neurological:      Mental Status: She is alert.         Laboratory:          No results for input(s): \"ALTSGPT\", \"ASTSGOT\", \"ALKPHOSPHAT\", \"TBILIRUBIN\", \"DBILIRUBIN\", \"GAMMAGT\", \"AMYLASE\", \"LIPASE\", \"ALB\", \"PREALBUMIN\", \"GLUCOSE\" in the last 72 hours.      No results for input(s): \"NTPROBNP\" in the last 72 hours.      No results for input(s): \"TROPONINT\" in the last 72 hours.    Imaging:  No orders to display       no X-Ray or EKG requiring interpretation    Assessment/Plan:  Justification for Admission Status  I anticipate this patient will require at least two midnights for appropriate medical management, necessitating inpatient admission because patient has pancreatitis    Patient will need a Med/Surg bed on MEDICAL service .  The need is secondary to pancreatitis.    * Acute pancreatitis  Assessment & Plan  Likely from alcohol abuse  Fluids  Morphine as needed  Serial abdominal exam        Alcohol abuse  Assessment & Plan  Drinks half a pint of liquor a day, last alcoholic beverage about 3 days ago  [facility] protocol    Gallbladder sludge  Assessment & Plan  Right upper quadrant ultrasound at outside facility showing distended gallbladder with a large amount of sludge.  The wall of the gallbladder is mildly prominent at 3.9 mm and early acute cholecystitis cannot be excluded.  General surgery contacted outside facility, recommended conservative management for now  Clinically patient has a negative Ignacio sign  HIDA scan ordered    Thrombocytopenia (HCC)  Assessment & Plan  Platelet 12 at outside facility. Suspect from bone marrow suppression  Repeat CBC            VTE prophylaxis: pharmacologic prophylaxis contraindicated due to thrombocytopenia  "

## 2024-06-10 NOTE — ASSESSMENT & PLAN NOTE
HIDA scan was negative for any acute cholecystitis  Elevated liver enzymes due to alcoholism.  Check ultrasound for any gallbladder stones  On 6/12, labs showed worsening elevated liver enzymes and bilirubin, will order images to rule out any obstruction.

## 2024-06-10 NOTE — ASSESSMENT & PLAN NOTE
As per history.   cessation.  Urine drug screen was positive  Discussed the risk of heart failure and death with the patient, she understood.

## 2024-06-10 NOTE — PROGRESS NOTES
4 Eyes Skin Assessment Completed by CARLINE Schuster and CARLINE Holguin.    Head WDL  Ears WDL  Nose WDL  Mouth WDL  Neck WDL  Breast/Chest WDL  Shoulder Blades WDL  Spine WDL  (R) Arm/Elbow/Hand WDL  (L) Arm/Elbow/Hand WDL  Abdomen WDL  Groin WDL  Scrotum/Coccyx/Buttocks WDL  (R) Leg WDL  (L) Leg WDL  (R) Heel/Foot/Toe WDL  (L) Heel/Foot/Toe WDL          Devices In Places Pulse Ox      Interventions In Place Pillows    Possible Skin Injury No    Pictures Uploaded Into Epic N/A  Wound Consult Placed N/A  RN Wound Prevention Protocol Ordered No

## 2024-06-10 NOTE — HOSPITAL COURSE
Brenda Cartagena is a 39 y.o. female who presented 6/9/2024 with abdominal pain.  Patient has history of heavy alcohol use and frequent methamphetamine use.  She has been drinking half a pint of vodka every day.  She last used methamphetamine 1 week ago and last drank alcohol 3 days ago.  About 2 days ago, patient began to have a squeezing epigastric abdominal pain associate with nausea and multiple episodes of vomiting.  She denies diarrhea fever chills.     At outside facility:  White blood cell count 4.1 hemoglobin 11 platelet 12   ALT 60 creatinine 0.6  Alcohol level 210  Throughout hospital course, hemoglobin has been trending down 11.8 -> 10 -> 9.5  Right upper quadrant ultrasound showing distended gallbladder with a large amount of sludge.  The wall of the gallbladder is mildly prominent at 3.9 mm and early acute cholecystitis cannot be excluded.  U tox positive for amphetamines  CTAP showing fatty, enlarged liver

## 2024-06-10 NOTE — PROGRESS NOTES
Patient arrived to floor via gurney with careflight.    Report received from Pamela CROWLEY, assumed Care.   Patient is AOx4, responds appropriately.      Pain controlled at this time.  Patient is currently NPO with sips, reports slight nausea - medicated per MAR. Up stand by assist with steady gait.    Plan of care discussed, all questions answered.    Educated on use of call light and importance of calling before getting out of bed. Pt verbalizes understanding.    Call light and belongings within reach, treaded slipper socks on, bed alarm in use, bed in lowest locked position.  All needs met at this time.

## 2024-06-10 NOTE — ASSESSMENT & PLAN NOTE
Due to pancreatitis and gastritis  IV fluids advance diet as tolerated  Omeprazole  Pain control, avoid aggressive IV pain control.

## 2024-06-10 NOTE — ASSESSMENT & PLAN NOTE
Likely related to drinking alcohol  Check B12 and TSH  Patient has pancytopenia, no signs of bleeding  Close monitoring and consider hematology consult if needed

## 2024-06-10 NOTE — PROGRESS NOTES
Hospital Medicine Daily Progress Note    Date of Service  6/10/2024    Chief Complaint  Brenda Cartagena is a 39 y.o. female admitted 6/9/2024 with abdominal pain.    Hospital Course  Brenda Cartagena is a 39 y.o. female who presented 6/9/2024 with abdominal pain.  Patient has history of heavy alcohol use and frequent methamphetamine use.  She has been drinking half a pint of vodka every day.  She last used methamphetamine 1 week ago and last drank alcohol 3 days ago.  About 2 days ago, patient began to have a squeezing epigastric abdominal pain associate with nausea and multiple episodes of vomiting.  She denies diarrhea fever chills.     At outside facility:  White blood cell count 4.1 hemoglobin 11 platelet 12   ALT 60 creatinine 0.6  Alcohol level 210  Throughout hospital course, hemoglobin has been trending down 11.8 -> 10 -> 9.5  Right upper quadrant ultrasound showing distended gallbladder with a large amount of sludge.  The wall of the gallbladder is mildly prominent at 3.9 mm and early acute cholecystitis cannot be excluded.  U tox positive for amphetamines  CTAP showing fatty, enlarged liver    Interval Problem Update  6/10/2024  Patient was seen and examined on G[facility] floor.  CIWA score 1-2.  Procalcitonin 0.29.  White count of 5.2.  HIDA scan negative for cholecystitis or cystic duct obstruction.  Antibiotics have been discontinued.  Severe abdominal pain requiring IV Dilaudid.  Advance to clear liquid diet.  IV fluids LR at 125 mL/h.      I have discussed this patient's plan of care and discharge plan at IDT rounds today with Case Management, Nursing, Nursing leadership, and other members of the IDT team.    Consultants/Specialty  none    Code Status  Full Code    Disposition  The patient is not medically cleared for discharge to home or a post-acute facility.  Anticipate discharge to: home with close outpatient follow-up    I have placed the appropriate orders for post-discharge  needs.    Review of Systems  Review of Systems   Constitutional:  Positive for malaise/fatigue. Negative for chills and fever.   Respiratory:  Negative for cough and shortness of breath.    Cardiovascular:  Negative for chest pain and palpitations.   Gastrointestinal:  Positive for abdominal pain. Negative for constipation, diarrhea, nausea and vomiting.   Musculoskeletal:  Negative for joint pain and myalgias.   Neurological:  Negative for dizziness and headaches.        Physical Exam  Temp:  [36.4 °C (97.5 °F)-37.1 °C (98.8 °F)] 36.4 °C (97.5 °F)  Pulse:  [] 93  Resp:  [18-20] 18  BP: (123-164)/() 123/86  SpO2:  [90 %-100 %] 97 %    Physical Exam  Vitals and nursing note reviewed.   Constitutional:       Appearance: She is normal weight. She is ill-appearing. She is not diaphoretic.   HENT:      Head: Normocephalic and atraumatic.      Mouth/Throat:      Mouth: Mucous membranes are moist.      Pharynx: Oropharynx is clear. No oropharyngeal exudate.   Eyes:      General:         Right eye: No discharge.         Left eye: No discharge.      Conjunctiva/sclera: Conjunctivae normal.      Pupils: Pupils are equal, round, and reactive to light.   Cardiovascular:      Rate and Rhythm: Normal rate and regular rhythm.      Pulses: Normal pulses.      Heart sounds: Normal heart sounds. No murmur heard.  Pulmonary:      Effort: Pulmonary effort is normal. No respiratory distress.      Breath sounds: Normal breath sounds.   Abdominal:      General: Abdomen is flat. Bowel sounds are normal. There is no distension.      Palpations: Abdomen is soft.      Tenderness: There is abdominal tenderness. There is no guarding or rebound.   Musculoskeletal:      Cervical back: Neck supple. No tenderness.      Right lower leg: No edema.      Left lower leg: No edema.   Skin:     Coloration: Skin is pale.   Neurological:      Mental Status: She is alert and oriented to person, place, and time.      Motor: No weakness.    Psychiatric:         Thought Content: Thought content normal.         Judgment: Judgment normal.         Fluids    Intake/Output Summary (Last 24 hours) at 6/10/2024 1622  Last data filed at 6/9/2024 2206  Gross per 24 hour   Intake 0 ml   Output 0 ml   Net 0 ml       Laboratory  Recent Labs     06/09/24  2227 06/10/24  0814 06/10/24  1112   WBC 4.3* 4.6* 5.2   RBC 3.24* 3.48* 3.30*   HEMOGLOBIN 10.8* 11.5* 11.2*   HEMATOCRIT 32.2* 33.4* 31.5*   MCV 99.4* 96.0 95.5   MCH 33.3* 33.0 33.9*   MCHC 33.5 34.4 35.6*   RDW 48.0 46.0 45.2   PLATELETCT 19* 21* 23*   MPV 11.8 11.3 12.0     Recent Labs     06/09/24  2227 06/10/24  0814 06/10/24  1112   SODIUM 131* 131* 130*   POTASSIUM 3.5* 3.3* 3.8   CHLORIDE 92* 89* 88*   CO2 23 23 25   GLUCOSE 139* 114* 142*   BUN 4* 2* 2*   CREATININE 0.25* 0.22* 0.20*   CALCIUM 8.4* 8.4* 8.4*                   Imaging  NM-BILIARY (HIDA) SCAN WITH CCK   Final Result      1.  No evidence of cystic duct obstruction   2.  Reduced gallbladder ejection fraction of uncertain clinical significance as some gallbladder emptying is evident even prior to CCK administration. Normal gallbladder ejection fraction is 38% or greater           Assessment/Plan  * Alcohol-induced acute pancreatitis without infection or necrosis  Assessment & Plan  Likely from alcohol abuse  Fluids  Morphine as needed  Serial abdominal exam    6/10/2024  Prior episodes of pancreatitis per chart review.  Lipase elevated 154.  White count within normal limits.  Procalcitonin only mildly elevated.  Continue to trend.  Significant abdominal pain requiring IV Dilaudid.  Continue IV fluids LR at 125 mL/h  Advance to clear liquid diet      Intractable abdominal pain- (present on admission)  Assessment & Plan  6/10/2024  Secondary to acute pancreatitis.  Scheduled Tylenol 1 g every 6 hours.  Oxycodone 10 mg every 4 hours as needed  IV Dilaudid 1 mg IV every 4 hours as needed for breakthrough pain  Continuous pulse oximetry  monitoring to monitor for hypoxia and respiratory depression while receiving IV narcotic medications.    Gallbladder sludge  Assessment & Plan  Right upper quadrant ultrasound at outside facility showing distended gallbladder with a large amount of sludge.  The wall of the gallbladder is mildly prominent at 3.9 mm and early acute cholecystitis cannot be excluded.  General surgery contacted outside facility, recommended conservative management for now  Clinically patient has a negative Ignacio sign  HIDA scan ordered    6/10/2024  TECHNIQUE/EXAM DESCRIPTION AND NUMBER OF VIEWS:  5.68 mCi Tc 99-Choletec was administered intravenously, followed by 60 minutes of anterior planar imaging. 1.052 micrograms CCK was then infused over 30 minutes, and anterior planar imaging was performed.  A gallbladder ejection fraction was then   calculated.     COMPARISON: None     FINDINGS:  There is prompt uptake of tracer in the hepatic parenchyma.  The gallbladder fills with activity within 60 minutes and activity is excreted into the small bowel which is evident even before CCK administration.     The gallbladder ejection fraction is 1%%.     IMPRESSION:     1.  No evidence of cystic duct obstruction  2.  Reduced gallbladder ejection fraction of uncertain clinical significance as some gallbladder emptying is evident even prior to CCK administration. Normal gallbladder ejection fraction is 38% or greater    No signs of cholecystitis.  Hold off further antibiotics.    Methamphetamine abuse (HCC)- (present on admission)  Assessment & Plan  6/10/2024  As per history.   cessation.    Uncomplicated alcohol dependence (HCC)  Assessment & Plan  Drinks half a pint of liquor a day, last alcoholic beverage about 3 days ago  CIWA protocol    6/10/2024  CIWA score 1-2.   cessation.  Referred back to Alcoholics Anonymous.    Thrombocytopenia (HCC)  Assessment & Plan  Platelet 12 at outside facility. Suspect from bone marrow  suppression  Repeat CBC    6/10/2024  Likely from splenic hepatic sequestration from all induced liver disease.  Appears to be improving.  No signs of spontaneous bleeding  Continue to trend, transfuse platelets if less than 10 or signs of bleeding             VTE prophylaxis:   SCDs/TEDs   pharmacologic prophylaxis contraindicated due to Thrombocytopenia      I have performed a physical exam and reviewed and updated ROS and Plan today (6/10/2024). In review of yesterday's note (6/9/2024), there are no changes except as documented above.

## 2024-06-11 PROBLEM — D61.818 PANCYTOPENIA (HCC): Status: ACTIVE | Noted: 2024-06-11

## 2024-06-11 LAB
ALBUMIN SERPL BCP-MCNC: 2.9 G/DL (ref 3.2–4.9)
ALBUMIN/GLOB SERPL: 0.9 G/DL
ALP SERPL-CCNC: 176 U/L (ref 30–99)
ALT SERPL-CCNC: 31 U/L (ref 2–50)
ANION GAP SERPL CALC-SCNC: 12 MMOL/L (ref 7–16)
AST SERPL-CCNC: 245 U/L (ref 12–45)
BASOPHILS # BLD AUTO: 1.7 % (ref 0–1.8)
BASOPHILS # BLD: 0.05 K/UL (ref 0–0.12)
BILIRUB SERPL-MCNC: 1.2 MG/DL (ref 0.1–1.5)
BUN SERPL-MCNC: 2 MG/DL (ref 8–22)
CALCIUM ALBUM COR SERPL-MCNC: 9.3 MG/DL (ref 8.5–10.5)
CALCIUM SERPL-MCNC: 8.4 MG/DL (ref 8.5–10.5)
CHLORIDE SERPL-SCNC: 96 MMOL/L (ref 96–112)
CO2 SERPL-SCNC: 26 MMOL/L (ref 20–33)
CREAT SERPL-MCNC: 0.22 MG/DL (ref 0.5–1.4)
EOSINOPHIL # BLD AUTO: 0 K/UL (ref 0–0.51)
EOSINOPHIL NFR BLD: 0 % (ref 0–6.9)
ERYTHROCYTE [DISTWIDTH] IN BLOOD BY AUTOMATED COUNT: 45.3 FL (ref 35.9–50)
GFR SERPLBLD CREATININE-BSD FMLA CKD-EPI: 147 ML/MIN/1.73 M 2
GLOBULIN SER CALC-MCNC: 3.4 G/DL (ref 1.9–3.5)
GLUCOSE SERPL-MCNC: 131 MG/DL (ref 65–99)
HCT VFR BLD AUTO: 31.8 % (ref 37–47)
HGB BLD-MCNC: 10.9 G/DL (ref 12–16)
LYMPHOCYTES # BLD AUTO: 0.43 K/UL (ref 1–4.8)
LYMPHOCYTES NFR BLD: 14.7 % (ref 22–41)
MAGNESIUM SERPL-MCNC: 1.9 MG/DL (ref 1.5–2.5)
MANUAL DIFF BLD: NORMAL
MCH RBC QN AUTO: 33 PG (ref 27–33)
MCHC RBC AUTO-ENTMCNC: 34.3 G/DL (ref 32.2–35.5)
MCV RBC AUTO: 96.4 FL (ref 81.4–97.8)
MONOCYTES # BLD AUTO: 0.08 K/UL (ref 0–0.85)
MONOCYTES NFR BLD AUTO: 2.6 % (ref 0–13.4)
MORPHOLOGY BLD-IMP: NORMAL
NEUTROPHILS # BLD AUTO: 2.35 K/UL (ref 1.82–7.42)
NEUTROPHILS NFR BLD: 81 % (ref 44–72)
NRBC # BLD AUTO: 0 K/UL
NRBC BLD-RTO: 0 /100 WBC (ref 0–0.2)
PHOSPHATE SERPL-MCNC: 1.5 MG/DL (ref 2.5–4.5)
PLATELET # BLD AUTO: 29 K/UL (ref 164–446)
PLATELET BLD QL SMEAR: NORMAL
PLATELETS.RETICULATED NFR BLD AUTO: 17.7 % (ref 0.6–13.1)
PMV BLD AUTO: 12.3 FL (ref 9–12.9)
POTASSIUM SERPL-SCNC: 3.3 MMOL/L (ref 3.6–5.5)
PROT SERPL-MCNC: 6.3 G/DL (ref 6–8.2)
RBC # BLD AUTO: 3.3 M/UL (ref 4.2–5.4)
RBC BLD AUTO: NORMAL
SODIUM SERPL-SCNC: 134 MMOL/L (ref 135–145)
TSH SERPL DL<=0.005 MIU/L-ACNC: 1.72 UIU/ML (ref 0.38–5.33)
VIT B12 SERPL-MCNC: 1788 PG/ML (ref 211–911)
WBC # BLD AUTO: 2.9 K/UL (ref 4.8–10.8)

## 2024-06-11 PROCEDURE — 85027 COMPLETE CBC AUTOMATED: CPT

## 2024-06-11 PROCEDURE — 700105 HCHG RX REV CODE 258: Performed by: INTERNAL MEDICINE

## 2024-06-11 PROCEDURE — A9270 NON-COVERED ITEM OR SERVICE: HCPCS | Performed by: INTERNAL MEDICINE

## 2024-06-11 PROCEDURE — 700111 HCHG RX REV CODE 636 W/ 250 OVERRIDE (IP): Performed by: INTERNAL MEDICINE

## 2024-06-11 PROCEDURE — 82607 VITAMIN B-12: CPT

## 2024-06-11 PROCEDURE — 80053 COMPREHEN METABOLIC PANEL: CPT

## 2024-06-11 PROCEDURE — 85007 BL SMEAR W/DIFF WBC COUNT: CPT

## 2024-06-11 PROCEDURE — 36415 COLL VENOUS BLD VENIPUNCTURE: CPT

## 2024-06-11 PROCEDURE — 700102 HCHG RX REV CODE 250 W/ 637 OVERRIDE(OP): Performed by: INTERNAL MEDICINE

## 2024-06-11 PROCEDURE — 99233 SBSQ HOSP IP/OBS HIGH 50: CPT | Performed by: INTERNAL MEDICINE

## 2024-06-11 PROCEDURE — 700111 HCHG RX REV CODE 636 W/ 250 OVERRIDE (IP): Performed by: STUDENT IN AN ORGANIZED HEALTH CARE EDUCATION/TRAINING PROGRAM

## 2024-06-11 PROCEDURE — 85055 RETICULATED PLATELET ASSAY: CPT

## 2024-06-11 PROCEDURE — 700102 HCHG RX REV CODE 250 W/ 637 OVERRIDE(OP): Performed by: STUDENT IN AN ORGANIZED HEALTH CARE EDUCATION/TRAINING PROGRAM

## 2024-06-11 PROCEDURE — 84443 ASSAY THYROID STIM HORMONE: CPT

## 2024-06-11 PROCEDURE — A9270 NON-COVERED ITEM OR SERVICE: HCPCS | Performed by: STUDENT IN AN ORGANIZED HEALTH CARE EDUCATION/TRAINING PROGRAM

## 2024-06-11 PROCEDURE — 84100 ASSAY OF PHOSPHORUS: CPT

## 2024-06-11 PROCEDURE — 770001 HCHG ROOM/CARE - MED/SURG/GYN PRIV*

## 2024-06-11 PROCEDURE — 83735 ASSAY OF MAGNESIUM: CPT

## 2024-06-11 PROCEDURE — 700101 HCHG RX REV CODE 250: Performed by: INTERNAL MEDICINE

## 2024-06-11 PROCEDURE — 700105 HCHG RX REV CODE 258: Performed by: STUDENT IN AN ORGANIZED HEALTH CARE EDUCATION/TRAINING PROGRAM

## 2024-06-11 RX ORDER — OMEPRAZOLE 20 MG/1
20 CAPSULE, DELAYED RELEASE ORAL 2 TIMES DAILY
Status: DISCONTINUED | OUTPATIENT
Start: 2024-06-11 | End: 2024-06-13 | Stop reason: HOSPADM

## 2024-06-11 RX ORDER — GAUZE BANDAGE 2" X 2"
100 BANDAGE TOPICAL DAILY
Status: DISCONTINUED | OUTPATIENT
Start: 2024-06-11 | End: 2024-06-11

## 2024-06-11 RX ORDER — HYDROMORPHONE HYDROCHLORIDE 1 MG/ML
0.5 INJECTION, SOLUTION INTRAMUSCULAR; INTRAVENOUS; SUBCUTANEOUS 3 TIMES DAILY PRN
Status: DISCONTINUED | OUTPATIENT
Start: 2024-06-11 | End: 2024-06-13 | Stop reason: HOSPADM

## 2024-06-11 RX ORDER — THIAMINE HYDROCHLORIDE 100 MG/ML
100 INJECTION, SOLUTION INTRAMUSCULAR; INTRAVENOUS 2 TIMES DAILY
Status: DISCONTINUED | OUTPATIENT
Start: 2024-06-11 | End: 2024-06-12

## 2024-06-11 RX ADMIN — OXYCODONE HYDROCHLORIDE 10 MG: 10 TABLET ORAL at 17:36

## 2024-06-11 RX ADMIN — THIAMINE HYDROCHLORIDE 100 MG: 100 INJECTION, SOLUTION INTRAMUSCULAR; INTRAVENOUS at 21:21

## 2024-06-11 RX ADMIN — ACETAMINOPHEN 1000 MG: 500 TABLET, FILM COATED ORAL at 23:58

## 2024-06-11 RX ADMIN — DIBASIC SODIUM PHOSPHATE, MONOBASIC POTASSIUM PHOSPHATE AND MONOBASIC SODIUM PHOSPHATE 250 MG: 852; 155; 130 TABLET ORAL at 17:35

## 2024-06-11 RX ADMIN — OMEPRAZOLE 20 MG: 20 CAPSULE, DELAYED RELEASE ORAL at 17:36

## 2024-06-11 RX ADMIN — OXYCODONE HYDROCHLORIDE 10 MG: 10 TABLET ORAL at 03:42

## 2024-06-11 RX ADMIN — POTASSIUM PHOSPHATE, MONOBASIC AND POTASSIUM PHOSPHATE, DIBASIC 30 MMOL: 224; 236 INJECTION, SOLUTION, CONCENTRATE INTRAVENOUS at 08:54

## 2024-06-11 RX ADMIN — DIBASIC SODIUM PHOSPHATE, MONOBASIC POTASSIUM PHOSPHATE AND MONOBASIC SODIUM PHOSPHATE 250 MG: 852; 155; 130 TABLET ORAL at 09:23

## 2024-06-11 RX ADMIN — DIBASIC SODIUM PHOSPHATE, MONOBASIC POTASSIUM PHOSPHATE AND MONOBASIC SODIUM PHOSPHATE 250 MG: 852; 155; 130 TABLET ORAL at 12:09

## 2024-06-11 RX ADMIN — ACETAMINOPHEN 1000 MG: 500 TABLET, FILM COATED ORAL at 04:18

## 2024-06-11 RX ADMIN — ACETAMINOPHEN 1000 MG: 500 TABLET, FILM COATED ORAL at 17:35

## 2024-06-11 RX ADMIN — OMEPRAZOLE 20 MG: 20 CAPSULE, DELAYED RELEASE ORAL at 09:23

## 2024-06-11 RX ADMIN — DIBASIC SODIUM PHOSPHATE, MONOBASIC POTASSIUM PHOSPHATE AND MONOBASIC SODIUM PHOSPHATE 250 MG: 852; 155; 130 TABLET ORAL at 23:58

## 2024-06-11 RX ADMIN — OXYCODONE HYDROCHLORIDE 10 MG: 10 TABLET ORAL at 12:08

## 2024-06-11 RX ADMIN — ACETAMINOPHEN 1000 MG: 500 TABLET, FILM COATED ORAL at 12:08

## 2024-06-11 RX ADMIN — SODIUM CHLORIDE, POTASSIUM CHLORIDE, SODIUM LACTATE AND CALCIUM CHLORIDE: 600; 310; 30; 20 INJECTION, SOLUTION INTRAVENOUS at 02:24

## 2024-06-11 RX ADMIN — OXYCODONE HYDROCHLORIDE 10 MG: 10 TABLET ORAL at 07:52

## 2024-06-11 RX ADMIN — THIAMINE HYDROCHLORIDE 100 MG: 100 INJECTION, SOLUTION INTRAMUSCULAR; INTRAVENOUS at 09:23

## 2024-06-11 RX ADMIN — ONDANSETRON 4 MG: 4 TABLET, ORALLY DISINTEGRATING ORAL at 03:42

## 2024-06-11 ASSESSMENT — LIFESTYLE VARIABLES
ORIENTATION AND CLOUDING OF SENSORIUM: ORIENTED AND CAN DO SERIAL ADDITIONS
VISUAL DISTURBANCES: NOT PRESENT
VISUAL DISTURBANCES: NOT PRESENT
TOTAL SCORE: 2
ANXIETY: MILDLY ANXIOUS
NAUSEA AND VOMITING: MILD NAUSEA WITH NO VOMITING
AUDITORY DISTURBANCES: NOT PRESENT
HEADACHE, FULLNESS IN HEAD: NOT PRESENT
ANXIETY: MILDLY ANXIOUS
NAUSEA AND VOMITING: NO NAUSEA AND NO VOMITING
AGITATION: NORMAL ACTIVITY
AGITATION: NORMAL ACTIVITY
AGITATION: SOMEWHAT MORE THAN NORMAL ACTIVITY
PAROXYSMAL SWEATS: NO SWEAT VISIBLE
ANXIETY: MILDLY ANXIOUS
HEADACHE, FULLNESS IN HEAD: NOT PRESENT
TREMOR: TREMOR NOT VISIBLE BUT CAN BE FELT, FINGERTIP TO FINGERTIP
HEADACHE, FULLNESS IN HEAD: NOT PRESENT
AGITATION: NORMAL ACTIVITY
AUDITORY DISTURBANCES: NOT PRESENT
ORIENTATION AND CLOUDING OF SENSORIUM: ORIENTED AND CAN DO SERIAL ADDITIONS
TREMOR: TREMOR NOT VISIBLE BUT CAN BE FELT, FINGERTIP TO FINGERTIP
AGITATION: NORMAL ACTIVITY
ORIENTATION AND CLOUDING OF SENSORIUM: ORIENTED AND CAN DO SERIAL ADDITIONS
PAROXYSMAL SWEATS: NO SWEAT VISIBLE
TOTAL SCORE: 1
VISUAL DISTURBANCES: NOT PRESENT
ANXIETY: MILDLY ANXIOUS
TOTAL SCORE: 2
TREMOR: NO TREMOR
PAROXYSMAL SWEATS: NO SWEAT VISIBLE
NAUSEA AND VOMITING: NO NAUSEA AND NO VOMITING
VISUAL DISTURBANCES: NOT PRESENT
AUDITORY DISTURBANCES: NOT PRESENT
HEADACHE, FULLNESS IN HEAD: NOT PRESENT
NAUSEA AND VOMITING: NO NAUSEA AND NO VOMITING
AUDITORY DISTURBANCES: NOT PRESENT
HEADACHE, FULLNESS IN HEAD: NOT PRESENT
TREMOR: NO TREMOR
ANXIETY: MILDLY ANXIOUS
ORIENTATION AND CLOUDING OF SENSORIUM: ORIENTED AND CAN DO SERIAL ADDITIONS
AUDITORY DISTURBANCES: NOT PRESENT
VISUAL DISTURBANCES: NOT PRESENT
PAROXYSMAL SWEATS: NO SWEAT VISIBLE
TOTAL SCORE: 1
NAUSEA AND VOMITING: NO NAUSEA AND NO VOMITING
TREMOR: NO TREMOR
PAROXYSMAL SWEATS: NO SWEAT VISIBLE
ORIENTATION AND CLOUDING OF SENSORIUM: ORIENTED AND CAN DO SERIAL ADDITIONS
TOTAL SCORE: 3

## 2024-06-11 ASSESSMENT — ENCOUNTER SYMPTOMS
COUGH: 0
PALPITATIONS: 0
HEADACHES: 0
MYALGIAS: 0
DIZZINESS: 0
CHILLS: 0
ABDOMINAL PAIN: 1
DIARRHEA: 0
SHORTNESS OF BREATH: 0
CONSTIPATION: 0
NAUSEA: 1
VOMITING: 0
FEVER: 0

## 2024-06-11 ASSESSMENT — PAIN DESCRIPTION - PAIN TYPE
TYPE: ACUTE PAIN
TYPE: ACUTE PAIN

## 2024-06-11 NOTE — PROGRESS NOTES
Bedside report received from night shift nurse. Assumed care at 0645.   Pt A&Ox4  Tolerating CLD diet, denies n/v. Hypoactive bowel sounds, passing flatus, LBM PTA. IV access through 22g RFA that is running LR @ 125.  Skin intact.  Saturating >90% on RA.  Pt ambulates SBA.  Pain is controlled through medication orders. Updated on plan of care. Safety education provided. Bed locked in low. Call light within reach. Rounding in place.

## 2024-06-11 NOTE — CARE PLAN
The patient is Stable - Low risk of patient condition declining or worsening    Shift Goals  Clinical Goals: CIWA, pain control  Patient Goals: comfort  Family Goals: no family at bedside    Progress made toward(s) clinical / shift goals:  Pt resting. Pain managed w/ medication per MD order. CIWA scores Q4 throughout shift.     Patient is not progressing towards the following goals:

## 2024-06-11 NOTE — PROGRESS NOTES
"Pt refused bed alarm. Pt educated profusely on safety and importance of using call light before getting up. Pt still refusing d/t \"its annoying\". Pt states she will want it back on at night. Charge RN notified.   "

## 2024-06-11 NOTE — ASSESSMENT & PLAN NOTE
With white blood cell around 2, hemoglobin around 10 platelets around 20  Due to significant history of alcoholism  B12 and TSH are normal  Multivitamins  Close monitoring and consider hematology consult if needed

## 2024-06-11 NOTE — PROGRESS NOTES
Hospital Medicine Daily Progress Note    Date of Service  6/11/2024    Chief Complaint  Brenda Cartagena is a 39 y.o. female admitted 6/9/2024 with abdominal pain.    Hospital Course    39-year-old female with history of heavy drinking alcohol and meth abuse who presented 6/9 with abdominal pain.  Patient has been drinking half a pint of vodka every day.  She last used methamphetamine 1 week ago and last drank alcohol 3 days before the admission, patient began to have a squeezing epigastric abdominal pain associate with nausea and multiple episodes of vomiting couple days before the admission.  She denies diarrhea fever chills.  Initially patient was evaluated at outside facility where they found elevated liver enzymes AST more than ALT, creatinine 0.6 with platelets 12 and hemoglobin around 9.5.  Ultrasound showed distended gallbladder with large amount of sludge possible early acute cholecystitis.  Also patient underwent CTA of her abdomen and showed fatty and enlarged liver only, patient was transferred to our facility for higher level of care, IV fluid with history of protocol were initiated, patient underwent HIDA scan which did not show any signs of cholecystitis.      Interval Problem Update  -Evaluated examined the patient at bedside, still having abdominal pain with nausea, advance diet as tolerated, patient is on clear liquid, evaluate the patient with labs tomorrow.  -Close monitoring and consider repeat images for abdomen if there is any worsening on her pain or worsening on her labs.  -CIWA protocol and thiamine  -Start with omeprazole  -Patient still having pancytopenia, likely related to alcoholism, check TSH and B12.  -Encouraged the patient to quit drinking alcohol and using meth.      I have discussed this patient's plan of care and discharge plan at IDT rounds today with Case Management, Nursing, Nursing leadership, and other members of the IDT team.    Consultants/Specialty  none    Code  Status  Full Code    Disposition  The patient is not medically cleared for discharge to home or a post-acute facility.  Anticipate discharge to: home with organized home healthcare and close outpatient follow-up    I have placed the appropriate orders for post-discharge needs.    Review of Systems  Review of Systems   Constitutional:  Positive for malaise/fatigue. Negative for chills and fever.   Respiratory:  Negative for cough and shortness of breath.    Cardiovascular:  Negative for chest pain and palpitations.   Gastrointestinal:  Positive for abdominal pain and nausea. Negative for constipation, diarrhea and vomiting.   Musculoskeletal:  Negative for joint pain and myalgias.   Neurological:  Negative for dizziness and headaches.        Physical Exam  Temp:  [36.4 °C (97.5 °F)-36.8 °C (98.2 °F)] 36.8 °C (98.2 °F)  Pulse:  [86-99] 99  Resp:  [16-18] 16  BP: (116-139)/(84-94) 139/89  SpO2:  [96 %-99 %] 99 %    Physical Exam  Vitals and nursing note reviewed.   Constitutional:       Appearance: She is normal weight. She is ill-appearing. She is not diaphoretic.   HENT:      Head: Normocephalic and atraumatic.      Mouth/Throat:      Mouth: Mucous membranes are moist.      Pharynx: Oropharynx is clear. No oropharyngeal exudate.   Eyes:      General:         Right eye: No discharge.         Left eye: No discharge.      Conjunctiva/sclera: Conjunctivae normal.      Pupils: Pupils are equal, round, and reactive to light.   Cardiovascular:      Rate and Rhythm: Normal rate and regular rhythm.      Pulses: Normal pulses.      Heart sounds: Normal heart sounds. No murmur heard.  Pulmonary:      Effort: Pulmonary effort is normal. No respiratory distress.      Breath sounds: Normal breath sounds.   Abdominal:      General: Abdomen is flat. Bowel sounds are normal. There is no distension.      Palpations: Abdomen is soft.      Tenderness: There is abdominal tenderness. There is no guarding or rebound.   Musculoskeletal:       Cervical back: Neck supple. No tenderness.      Right lower leg: No edema.      Left lower leg: No edema.   Skin:     Coloration: Skin is pale.   Neurological:      Mental Status: She is alert and oriented to person, place, and time.      Motor: No weakness.   Psychiatric:         Thought Content: Thought content normal.         Judgment: Judgment normal.         Fluids    Intake/Output Summary (Last 24 hours) at 6/11/2024 1231  Last data filed at 6/11/2024 1131  Gross per 24 hour   Intake 240 ml   Output 3 ml   Net 237 ml       Laboratory  Recent Labs     06/10/24  0814 06/10/24  1112 06/11/24  0258   WBC 4.6* 5.2 2.9*   RBC 3.48* 3.30* 3.30*   HEMOGLOBIN 11.5* 11.2* 10.9*   HEMATOCRIT 33.4* 31.5* 31.8*   MCV 96.0 95.5 96.4   MCH 33.0 33.9* 33.0   MCHC 34.4 35.6* 34.3   RDW 46.0 45.2 45.3   PLATELETCT 21* 23* 29*   MPV 11.3 12.0 12.3     Recent Labs     06/10/24  0814 06/10/24  1112 06/11/24  0258   SODIUM 131* 130* 134*   POTASSIUM 3.3* 3.8 3.3*   CHLORIDE 89* 88* 96   CO2 23 25 26   GLUCOSE 114* 142* 131*   BUN 2* 2* 2*   CREATININE 0.22* 0.20* 0.22*   CALCIUM 8.4* 8.4* 8.4*                   Imaging  NM-BILIARY (HIDA) SCAN WITH CCK   Final Result      1.  No evidence of cystic duct obstruction   2.  Reduced gallbladder ejection fraction of uncertain clinical significance as some gallbladder emptying is evident even prior to CCK administration. Normal gallbladder ejection fraction is 38% or greater           Assessment/Plan  * Alcohol-induced acute pancreatitis without infection or necrosis  Assessment & Plan  With abdominal pain and elevated lipase 154  Continue advance diet as tolerated, IV fluid  Pain control  Labs daily  Ultrasound and CT scan did not show any seizure or necrosis pancreatitis, no stones, continue monitoring closely and consider repeat images if needed.    Pancytopenia (HCC)  Assessment & Plan  With white blood cell around 2, hemoglobin around 10 platelets around 20  Due to significant  history of alcoholism  Check B12 and TSH  Multivitamins  Close monitoring and consider hematology consult if needed    Intractable abdominal pain- (present on admission)  Assessment & Plan  Due to pancreatitis and gastritis  IV fluids advance diet as tolerated  Omeprazole  Pain control, avoid aggressive IV pain control.    Methamphetamine abuse (HCC)- (present on admission)  Assessment & Plan  As per history.   cessation.  Urine drug screen was positive  Discussed the risk of heart failure and death with the patient, she understood.    Uncomplicated alcohol dependence (HCC)  Assessment & Plan  Drinks half a pint of liquor a day, last alcoholic beverage about 3 days ago  [facility] protocol   cessation.  Referred back to Alcoholics Anonymous.    Gallbladder sludge  Assessment & Plan  HIDA scan was negative for any acute cholecystitis  Elevated liver enzymes due to alcoholism.    Thrombocytopenia (HCC)  Assessment & Plan  Likely related to drinking alcohol  Check B12 and TSH  Patient has pancytopenia, no signs of bleeding  Close monitoring and consider hematology consult if needed         VTE prophylaxis:   SCDs/TEDs   pharmacologic prophylaxis contraindicated due to Thrombocytopenia      I have performed a physical exam and reviewed and updated ROS and Plan today (6/11/2024). In review of yesterday's note (6/10/2024), there are no changes except as documented above.    Greater than 51 minutes spent prepping to see patient (e.g. review of tests) obtaining and/or reviewing separately obtained history. Performing a medically appropriate examination and/ evaluation.  Counseling and educating the patient/family/caregiver.  Ordering medications, tests, or procedures.  Referring and communicating with other health care professionals.  Documenting clinical information in EPIC.  Independently interpreting results and communicating results to patient/family/caregiver.  Care coordination

## 2024-06-11 NOTE — CARE PLAN
The patient is Stable - Low risk of patient condition declining or worsening    Shift Goals  Clinical Goals: pain control, safety and comfort  Patient Goals: comfort  Family Goals: no family at bedside    Progress made toward(s) clinical / shift goals:    Problem: Knowledge Deficit - Standard  Goal: Patient and family/care givers will demonstrate understanding of plan of care, disease process/condition, diagnostic tests and medications  Outcome: Progressing     Problem: Fall Risk  Goal: Patient will remain free from falls  Outcome: Progressing     Problem: Pain - Standard  Goal: Alleviation of pain or a reduction in pain to the patient’s comfort goal  Outcome: Progressing     Patient is alert and oriented, on RA.   Fall protocol in effect. Bed in lowest position. Brakes and bed alarm on.   Maintained a clutter free environment. Skid socks on. Call light and personal belongings within reach.   Patient c/o of pain, treated per MAR. Educated on pain scale.   Patient educated on POC. Encouraged verbalize of feelings.   All questions were answered.    Patient is not progressing towards the following goals:

## 2024-06-12 ENCOUNTER — APPOINTMENT (OUTPATIENT)
Dept: RADIOLOGY | Facility: MEDICAL CENTER | Age: 39
DRG: 439 | End: 2024-06-12
Attending: INTERNAL MEDICINE
Payer: MEDICAID

## 2024-06-12 VITALS
DIASTOLIC BLOOD PRESSURE: 82 MMHG | HEIGHT: 63 IN | OXYGEN SATURATION: 96 % | TEMPERATURE: 96.9 F | BODY MASS INDEX: 20.59 KG/M2 | HEART RATE: 91 BPM | SYSTOLIC BLOOD PRESSURE: 106 MMHG | RESPIRATION RATE: 17 BRPM | WEIGHT: 116.18 LBS

## 2024-06-12 LAB
ALBUMIN SERPL BCP-MCNC: 3.2 G/DL (ref 3.2–4.9)
ALBUMIN/GLOB SERPL: 1.1 G/DL
ALP SERPL-CCNC: 171 U/L (ref 30–99)
ALT SERPL-CCNC: 70 U/L (ref 2–50)
ANION GAP SERPL CALC-SCNC: 14 MMOL/L (ref 7–16)
AST SERPL-CCNC: 527 U/L (ref 12–45)
BASOPHILS # BLD AUTO: 0.3 % (ref 0–1.8)
BASOPHILS # BLD: 0.01 K/UL (ref 0–0.12)
BILIRUB SERPL-MCNC: 2.8 MG/DL (ref 0.1–1.5)
BUN SERPL-MCNC: 2 MG/DL (ref 8–22)
CALCIUM ALBUM COR SERPL-MCNC: 9 MG/DL (ref 8.5–10.5)
CALCIUM SERPL-MCNC: 8.4 MG/DL (ref 8.5–10.5)
CHLORIDE SERPL-SCNC: 97 MMOL/L (ref 96–112)
CO2 SERPL-SCNC: 25 MMOL/L (ref 20–33)
CREAT SERPL-MCNC: 0.17 MG/DL (ref 0.5–1.4)
CRP SERPL HS-MCNC: 2.95 MG/DL (ref 0–0.75)
EOSINOPHIL # BLD AUTO: 0.02 K/UL (ref 0–0.51)
EOSINOPHIL NFR BLD: 0.6 % (ref 0–6.9)
ERYTHROCYTE [DISTWIDTH] IN BLOOD BY AUTOMATED COUNT: 46.6 FL (ref 35.9–50)
GFR SERPLBLD CREATININE-BSD FMLA CKD-EPI: 156 ML/MIN/1.73 M 2
GLOBULIN SER CALC-MCNC: 2.9 G/DL (ref 1.9–3.5)
GLUCOSE SERPL-MCNC: 143 MG/DL (ref 65–99)
HCT VFR BLD AUTO: 32.8 % (ref 37–47)
HGB BLD-MCNC: 11.3 G/DL (ref 12–16)
IMM GRANULOCYTES # BLD AUTO: 0.01 K/UL (ref 0–0.11)
IMM GRANULOCYTES NFR BLD AUTO: 0.3 % (ref 0–0.9)
LIPASE SERPL-CCNC: 55 U/L (ref 11–82)
LYMPHOCYTES # BLD AUTO: 0.58 K/UL (ref 1–4.8)
LYMPHOCYTES NFR BLD: 16.4 % (ref 22–41)
MAGNESIUM SERPL-MCNC: 1.1 MG/DL (ref 1.5–2.5)
MCH RBC QN AUTO: 33.1 PG (ref 27–33)
MCHC RBC AUTO-ENTMCNC: 34.5 G/DL (ref 32.2–35.5)
MCV RBC AUTO: 96.2 FL (ref 81.4–97.8)
MONOCYTES # BLD AUTO: 0.35 K/UL (ref 0–0.85)
MONOCYTES NFR BLD AUTO: 9.9 % (ref 0–13.4)
NEUTROPHILS # BLD AUTO: 2.57 K/UL (ref 1.82–7.42)
NEUTROPHILS NFR BLD: 72.5 % (ref 44–72)
NRBC # BLD AUTO: 0 K/UL
NRBC BLD-RTO: 0 /100 WBC (ref 0–0.2)
PHOSPHATE SERPL-MCNC: 4 MG/DL (ref 2.5–4.5)
PLATELET # BLD AUTO: 52 K/UL (ref 164–446)
PLATELETS.RETICULATED NFR BLD AUTO: 17 % (ref 0.6–13.1)
PMV BLD AUTO: 12.3 FL (ref 9–12.9)
POTASSIUM SERPL-SCNC: 3.6 MMOL/L (ref 3.6–5.5)
PROT SERPL-MCNC: 6.1 G/DL (ref 6–8.2)
RBC # BLD AUTO: 3.41 M/UL (ref 4.2–5.4)
SODIUM SERPL-SCNC: 136 MMOL/L (ref 135–145)
WBC # BLD AUTO: 3.5 K/UL (ref 4.8–10.8)

## 2024-06-12 PROCEDURE — 36415 COLL VENOUS BLD VENIPUNCTURE: CPT

## 2024-06-12 PROCEDURE — 700111 HCHG RX REV CODE 636 W/ 250 OVERRIDE (IP): Performed by: INTERNAL MEDICINE

## 2024-06-12 PROCEDURE — 99233 SBSQ HOSP IP/OBS HIGH 50: CPT | Performed by: INTERNAL MEDICINE

## 2024-06-12 PROCEDURE — 770001 HCHG ROOM/CARE - MED/SURG/GYN PRIV*

## 2024-06-12 PROCEDURE — 700102 HCHG RX REV CODE 250 W/ 637 OVERRIDE(OP): Performed by: STUDENT IN AN ORGANIZED HEALTH CARE EDUCATION/TRAINING PROGRAM

## 2024-06-12 PROCEDURE — 85055 RETICULATED PLATELET ASSAY: CPT

## 2024-06-12 PROCEDURE — 84100 ASSAY OF PHOSPHORUS: CPT

## 2024-06-12 PROCEDURE — 74170 CT ABD WO CNTRST FLWD CNTRST: CPT

## 2024-06-12 PROCEDURE — 700111 HCHG RX REV CODE 636 W/ 250 OVERRIDE (IP): Performed by: STUDENT IN AN ORGANIZED HEALTH CARE EDUCATION/TRAINING PROGRAM

## 2024-06-12 PROCEDURE — 85025 COMPLETE CBC W/AUTO DIFF WBC: CPT

## 2024-06-12 PROCEDURE — 83690 ASSAY OF LIPASE: CPT

## 2024-06-12 PROCEDURE — 700117 HCHG RX CONTRAST REV CODE 255: Performed by: INTERNAL MEDICINE

## 2024-06-12 PROCEDURE — A9270 NON-COVERED ITEM OR SERVICE: HCPCS | Performed by: STUDENT IN AN ORGANIZED HEALTH CARE EDUCATION/TRAINING PROGRAM

## 2024-06-12 PROCEDURE — 86140 C-REACTIVE PROTEIN: CPT

## 2024-06-12 PROCEDURE — A9270 NON-COVERED ITEM OR SERVICE: HCPCS | Performed by: INTERNAL MEDICINE

## 2024-06-12 PROCEDURE — 80053 COMPREHEN METABOLIC PANEL: CPT

## 2024-06-12 PROCEDURE — 700102 HCHG RX REV CODE 250 W/ 637 OVERRIDE(OP): Performed by: INTERNAL MEDICINE

## 2024-06-12 PROCEDURE — 83735 ASSAY OF MAGNESIUM: CPT

## 2024-06-12 RX ORDER — GAUZE BANDAGE 2" X 2"
100 BANDAGE TOPICAL 2 TIMES DAILY
Status: DISCONTINUED | OUTPATIENT
Start: 2024-06-12 | End: 2024-06-13 | Stop reason: HOSPADM

## 2024-06-12 RX ORDER — LANOLIN ALCOHOL/MO/W.PET/CERES
100 CREAM (GRAM) TOPICAL 2 TIMES DAILY
Qty: 30 TABLET | Refills: 1 | Status: SHIPPED | OUTPATIENT
Start: 2024-06-12

## 2024-06-12 RX ORDER — OMEPRAZOLE 20 MG/1
20 CAPSULE, DELAYED RELEASE ORAL 2 TIMES DAILY
Qty: 30 CAPSULE | Refills: 1 | Status: SHIPPED | OUTPATIENT
Start: 2024-06-12

## 2024-06-12 RX ORDER — MAGNESIUM SULFATE HEPTAHYDRATE 40 MG/ML
4 INJECTION, SOLUTION INTRAVENOUS ONCE
Status: COMPLETED | OUTPATIENT
Start: 2024-06-12 | End: 2024-06-12

## 2024-06-12 RX ADMIN — OMEPRAZOLE 20 MG: 20 CAPSULE, DELAYED RELEASE ORAL at 17:40

## 2024-06-12 RX ADMIN — DIBASIC SODIUM PHOSPHATE, MONOBASIC POTASSIUM PHOSPHATE AND MONOBASIC SODIUM PHOSPHATE 250 MG: 852; 155; 130 TABLET ORAL at 04:05

## 2024-06-12 RX ADMIN — ONDANSETRON 4 MG: 4 TABLET, ORALLY DISINTEGRATING ORAL at 22:02

## 2024-06-12 RX ADMIN — OMEPRAZOLE 20 MG: 20 CAPSULE, DELAYED RELEASE ORAL at 04:05

## 2024-06-12 RX ADMIN — OXYCODONE HYDROCHLORIDE 10 MG: 10 TABLET ORAL at 02:25

## 2024-06-12 RX ADMIN — ONDANSETRON 4 MG: 2 INJECTION INTRAMUSCULAR; INTRAVENOUS at 11:48

## 2024-06-12 RX ADMIN — IOHEXOL 100 ML: 350 INJECTION, SOLUTION INTRAVENOUS at 16:14

## 2024-06-12 RX ADMIN — DIBASIC SODIUM PHOSPHATE, MONOBASIC POTASSIUM PHOSPHATE AND MONOBASIC SODIUM PHOSPHATE 250 MG: 852; 155; 130 TABLET ORAL at 17:41

## 2024-06-12 RX ADMIN — ACETAMINOPHEN 1000 MG: 500 TABLET, FILM COATED ORAL at 17:41

## 2024-06-12 RX ADMIN — ONDANSETRON 4 MG: 2 INJECTION INTRAMUSCULAR; INTRAVENOUS at 02:26

## 2024-06-12 RX ADMIN — DIBASIC SODIUM PHOSPHATE, MONOBASIC POTASSIUM PHOSPHATE AND MONOBASIC SODIUM PHOSPHATE 250 MG: 852; 155; 130 TABLET ORAL at 12:45

## 2024-06-12 RX ADMIN — OXYCODONE HYDROCHLORIDE 10 MG: 10 TABLET ORAL at 22:02

## 2024-06-12 RX ADMIN — MAGNESIUM SULFATE HEPTAHYDRATE 4 G: 4 INJECTION, SOLUTION INTRAVENOUS at 08:46

## 2024-06-12 RX ADMIN — ACETAMINOPHEN 1000 MG: 500 TABLET, FILM COATED ORAL at 04:05

## 2024-06-12 RX ADMIN — Medication 100 MG: at 17:40

## 2024-06-12 RX ADMIN — OXYCODONE HYDROCHLORIDE 10 MG: 10 TABLET ORAL at 12:45

## 2024-06-12 RX ADMIN — OXYCODONE HYDROCHLORIDE 10 MG: 10 TABLET ORAL at 08:42

## 2024-06-12 RX ADMIN — HYDROMORPHONE HYDROCHLORIDE 0.5 MG: 1 INJECTION, SOLUTION INTRAMUSCULAR; INTRAVENOUS; SUBCUTANEOUS at 15:46

## 2024-06-12 RX ADMIN — ACETAMINOPHEN 1000 MG: 500 TABLET, FILM COATED ORAL at 12:45

## 2024-06-12 RX ADMIN — THIAMINE HYDROCHLORIDE 100 MG: 100 INJECTION, SOLUTION INTRAMUSCULAR; INTRAVENOUS at 04:05

## 2024-06-12 ASSESSMENT — LIFESTYLE VARIABLES
VISUAL DISTURBANCES: NOT PRESENT
TREMOR: NO TREMOR
ORIENTATION AND CLOUDING OF SENSORIUM: ORIENTED AND CAN DO SERIAL ADDITIONS
TREMOR: TREMOR NOT VISIBLE BUT CAN BE FELT, FINGERTIP TO FINGERTIP
HEADACHE, FULLNESS IN HEAD: NOT PRESENT
AGITATION: NORMAL ACTIVITY
HEADACHE, FULLNESS IN HEAD: NOT PRESENT
AUDITORY DISTURBANCES: NOT PRESENT
ANXIETY: MILDLY ANXIOUS
NAUSEA AND VOMITING: *
AUDITORY DISTURBANCES: NOT PRESENT
AUDITORY DISTURBANCES: NOT PRESENT
TREMOR: NO TREMOR
AUDITORY DISTURBANCES: NOT PRESENT
VISUAL DISTURBANCES: NOT PRESENT
VISUAL DISTURBANCES: NOT PRESENT
AGITATION: NORMAL ACTIVITY
TOTAL SCORE: 1
ANXIETY: NO ANXIETY (AT EASE)
AGITATION: NORMAL ACTIVITY
ORIENTATION AND CLOUDING OF SENSORIUM: ORIENTED AND CAN DO SERIAL ADDITIONS
HEADACHE, FULLNESS IN HEAD: NOT PRESENT
AGITATION: NORMAL ACTIVITY
TOTAL SCORE: 1
TOTAL SCORE: 2
ORIENTATION AND CLOUDING OF SENSORIUM: ORIENTED AND CAN DO SERIAL ADDITIONS
ORIENTATION AND CLOUDING OF SENSORIUM: ORIENTED AND CAN DO SERIAL ADDITIONS
PAROXYSMAL SWEATS: NO SWEAT VISIBLE
AUDITORY DISTURBANCES: NOT PRESENT
NAUSEA AND VOMITING: MILD NAUSEA WITH NO VOMITING
ORIENTATION AND CLOUDING OF SENSORIUM: ORIENTED AND CAN DO SERIAL ADDITIONS
PAROXYSMAL SWEATS: NO SWEAT VISIBLE
HEADACHE, FULLNESS IN HEAD: NOT PRESENT
TOTAL SCORE: 2
TREMOR: NO TREMOR
AGITATION: NORMAL ACTIVITY
NAUSEA AND VOMITING: MILD NAUSEA WITH NO VOMITING
NAUSEA AND VOMITING: MILD NAUSEA WITH NO VOMITING
TREMOR: NO TREMOR
PAROXYSMAL SWEATS: NO SWEAT VISIBLE
VISUAL DISTURBANCES: NOT PRESENT
ANXIETY: MILDLY ANXIOUS
ANXIETY: MILDLY ANXIOUS
AUDITORY DISTURBANCES: NOT PRESENT
ANXIETY: MILDLY ANXIOUS
PAROXYSMAL SWEATS: NO SWEAT VISIBLE
ANXIETY: NO ANXIETY (AT EASE)
NAUSEA AND VOMITING: MILD NAUSEA WITH NO VOMITING
VISUAL DISTURBANCES: NOT PRESENT
AGITATION: NORMAL ACTIVITY
TOTAL SCORE: 4
NAUSEA AND VOMITING: MILD NAUSEA WITH NO VOMITING
VISUAL DISTURBANCES: NOT PRESENT
PAROXYSMAL SWEATS: NO SWEAT VISIBLE
ORIENTATION AND CLOUDING OF SENSORIUM: ORIENTED AND CAN DO SERIAL ADDITIONS
TOTAL SCORE: 2
HEADACHE, FULLNESS IN HEAD: NOT PRESENT
PAROXYSMAL SWEATS: NO SWEAT VISIBLE
HEADACHE, FULLNESS IN HEAD: NOT PRESENT
TREMOR: NO TREMOR

## 2024-06-12 ASSESSMENT — ENCOUNTER SYMPTOMS
CONSTIPATION: 0
SHORTNESS OF BREATH: 0
VOMITING: 0
MYALGIAS: 0
DIZZINESS: 0
COUGH: 0
ABDOMINAL PAIN: 1
PALPITATIONS: 0
DIARRHEA: 0
FEVER: 0
CHILLS: 0
HEADACHES: 0
NAUSEA: 1

## 2024-06-12 ASSESSMENT — PAIN DESCRIPTION - PAIN TYPE
TYPE: ACUTE PAIN

## 2024-06-12 NOTE — PROGRESS NOTES
Hospital Medicine Daily Progress Note    Date of Service  6/12/2024    Chief Complaint  Brenda Cartagena is a 39 y.o. female admitted 6/9/2024 with abdominal pain.    Hospital Course    39-year-old female with history of heavy drinking alcohol and meth abuse who presented 6/9 with abdominal pain.  Patient has been drinking half a pint of vodka every day.  She last used methamphetamine 1 week ago and last drank alcohol 3 days before the admission, patient began to have a squeezing epigastric abdominal pain associate with nausea and multiple episodes of vomiting couple days before the admission.  She denies diarrhea fever chills.  Initially patient was evaluated at outside facility where they found elevated liver enzymes AST more than ALT, creatinine 0.6 with platelets 12 and hemoglobin around 9.5.  Ultrasound showed distended gallbladder with large amount of sludge possible early acute cholecystitis.  Also patient underwent CTA of her abdomen and showed fatty and enlarged liver only, patient was transferred to our facility for higher level of care, IV fluid with history of protocol were initiated, patient underwent HIDA scan which did not show any signs of cholecystitis.      Interval Problem Update  -Evaluated examined the patient at bedside, patient was doing okay during the morning however she ate cheese on her lunch and started having severe abdominal pain, labs showed worsening bilirubin and liver enzymes, will order images to rule out any stones causing pancreatitis  -CIWA is low and no significant signs of alcohol withdrawal.  -Start with omeprazole  -Improving on her pancytopenia      I have discussed this patient's plan of care and discharge plan at IDT rounds today with Case Management, Nursing, Nursing leadership, and other members of the IDT team.    Consultants/Specialty  none    Code Status  Full Code    Disposition  The patient is not medically cleared for discharge to home or a post-acute  facility.  Anticipate discharge to: home with close outpatient follow-up    I have placed the appropriate orders for post-discharge needs.    Review of Systems  Review of Systems   Constitutional:  Positive for malaise/fatigue. Negative for chills and fever.   Respiratory:  Negative for cough and shortness of breath.    Cardiovascular:  Negative for chest pain and palpitations.   Gastrointestinal:  Positive for abdominal pain and nausea. Negative for constipation, diarrhea and vomiting.   Musculoskeletal:  Negative for joint pain and myalgias.   Neurological:  Negative for dizziness and headaches.        Physical Exam  Temp:  [36.1 °C (97 °F)-36.8 °C (98.2 °F)] 36.8 °C (98.2 °F)  Pulse:  [] 106  Resp:  [16-17] 17  BP: (116-138)/() 116/82  SpO2:  [95 %-98 %] 98 %    Physical Exam  Vitals and nursing note reviewed.   Constitutional:       Appearance: She is normal weight. She is ill-appearing. She is not diaphoretic.   HENT:      Head: Normocephalic and atraumatic.      Mouth/Throat:      Mouth: Mucous membranes are moist.      Pharynx: Oropharynx is clear. No oropharyngeal exudate.   Eyes:      General:         Right eye: No discharge.         Left eye: No discharge.      Conjunctiva/sclera: Conjunctivae normal.      Pupils: Pupils are equal, round, and reactive to light.   Cardiovascular:      Rate and Rhythm: Normal rate and regular rhythm.      Pulses: Normal pulses.      Heart sounds: Normal heart sounds. No murmur heard.  Pulmonary:      Effort: Pulmonary effort is normal. No respiratory distress.      Breath sounds: Normal breath sounds.   Abdominal:      General: Abdomen is flat. Bowel sounds are normal. There is no distension.      Palpations: Abdomen is soft.      Tenderness: There is abdominal tenderness. There is no guarding or rebound.   Musculoskeletal:      Cervical back: Neck supple. No tenderness.      Right lower leg: No edema.      Left lower leg: No edema.   Skin:     Coloration: Skin  is pale.   Neurological:      Mental Status: She is alert and oriented to person, place, and time.      Motor: No weakness.   Psychiatric:         Thought Content: Thought content normal.         Judgment: Judgment normal.         Fluids    Intake/Output Summary (Last 24 hours) at 6/12/2024 1444  Last data filed at 6/12/2024 1400  Gross per 24 hour   Intake 740 ml   Output --   Net 740 ml       Laboratory  Recent Labs     06/10/24  1112 06/11/24  0258 06/12/24 0327   WBC 5.2 2.9* 3.5*   RBC 3.30* 3.30* 3.41*   HEMOGLOBIN 11.2* 10.9* 11.3*   HEMATOCRIT 31.5* 31.8* 32.8*   MCV 95.5 96.4 96.2   MCH 33.9* 33.0 33.1*   MCHC 35.6* 34.3 34.5   RDW 45.2 45.3 46.6   PLATELETCT 23* 29* 52*   MPV 12.0 12.3 12.3     Recent Labs     06/10/24  1112 06/11/24 0258 06/12/24 0327   SODIUM 130* 134* 136   POTASSIUM 3.8 3.3* 3.6   CHLORIDE 88* 96 97   CO2 25 26 25   GLUCOSE 142* 131* 143*   BUN 2* 2* 2*   CREATININE 0.20* 0.22* 0.17*   CALCIUM 8.4* 8.4* 8.4*                   Imaging  NM-BILIARY (HIDA) SCAN WITH CCK   Final Result      1.  No evidence of cystic duct obstruction   2.  Reduced gallbladder ejection fraction of uncertain clinical significance as some gallbladder emptying is evident even prior to CCK administration. Normal gallbladder ejection fraction is 38% or greater      US-RUQ    (Results Pending)   CT-PANCREAS AND ABDOMEN WITH & W/O    (Results Pending)        Assessment/Plan  * Alcohol-induced acute pancreatitis without infection or necrosis  Assessment & Plan  With abdominal pain and elevated lipase 154  Continue advance diet as tolerated, IV fluid  Pain control  Labs daily  Worsening elevated liver enzymes and bilirubin, will order CT scan  Order ultrasound for gallbladder and evaluate for any stones.    Pancytopenia (HCC)  Assessment & Plan  With white blood cell around 2, hemoglobin around 10 platelets around 20  Due to significant history of alcoholism  B12 and TSH are normal  Multivitamins  Close monitoring  and consider hematology consult if needed    Intractable abdominal pain- (present on admission)  Assessment & Plan  Due to pancreatitis and gastritis  IV fluids advance diet as tolerated  Omeprazole  Pain control, avoid aggressive IV pain control.    Methamphetamine abuse (HCC)- (present on admission)  Assessment & Plan  As per history.   cessation.  Urine drug screen was positive  Discussed the risk of heart failure and death with the patient, she understood.    Uncomplicated alcohol dependence (HCC)  Assessment & Plan  Drinks half a pint of liquor a day, last alcoholic beverage about 3 days ago  [facility] protocol   cessation.  Referred back to Alcoholics Anonymous.    Gallbladder sludge  Assessment & Plan  HIDA scan was negative for any acute cholecystitis  Elevated liver enzymes due to alcoholism.  Check ultrasound for any gallbladder stones  On 6/12, labs showed worsening elevated liver enzymes and bilirubin, will order images to rule out any obstruction.    Thrombocytopenia (HCC)  Assessment & Plan  Likely related to drinking alcohol  Check B12 and TSH  Patient has pancytopenia, no signs of bleeding  Close monitoring and consider hematology consult if needed         VTE prophylaxis:   SCDs/TEDs   enoxaparin ppx      I have performed a physical exam and reviewed and updated ROS and Plan today (6/12/2024). In review of yesterday's note (6/11/2024), there are no changes except as documented above.    Greater than 51 minutes spent prepping to see patient (e.g. review of tests) obtaining and/or reviewing separately obtained history. Performing a medically appropriate examination and/ evaluation.  Counseling and educating the patient/family/caregiver.  Ordering medications, tests, or procedures.  Referring and communicating with other health care professionals.  Documenting clinical information in EPIC.  Independently interpreting results and communicating results to patient/family/caregiver.  Care  coordination

## 2024-06-12 NOTE — PROGRESS NOTES
Bedside report received, assessment completed    A&O x  4, pt calls appropriately  Mobility: Up self  Fall Risk Assessment: Moderate, bed alarm refused, door notifications in use  Pain Assessment / Reassessment completed, medication provided per MAR  Diet: Clears  LDA:   IV Access: 22 R FA, CDI/ flushed/ Infusing per MAR     GI/: + void, - flatus, PTA BM  DVT Prophylaxis: SCD's refused    Reviewed plan of care with patient, bed in lowest position and locked, pt resting comfortably now, call light within reach, all needs met at this time. Interventions will be executed per plan of care

## 2024-06-12 NOTE — PROGRESS NOTES
Bedside report received.  Assessment complete.  A&O x 4. Patient calls appropriately.  Patient ambulates with no assist. Bed alarm refused.   Patient has 7/10 pain. Patient medicated per MAR.  Complains of nausea related to pain, declines anti-emetics at this time. GI soft diet ordered  + void, + flatus, + BM.  Patient denies SOB.  SCD's refused.    Review plan with of care with patient. Call light and personal belongings within reach. Hourly rounding in place. All needs met at this time.

## 2024-06-12 NOTE — PROGRESS NOTES
Pt seen by another RN leaving floor. This RN and another RN followed down elevator in attempt to find pt.  at front told this RN he told her where the cafeteria was. This RN and other RN walked around entire first floor in attempt to find pt. Pt not found. UC called and said pt is back in her room. Pt educated on importance of not leaving the unit again. Pt states understanding. Call light within reach, hourly rounding in place.

## 2024-06-12 NOTE — PROGRESS NOTES
Pt refusing bed alarm at this time. This RN educated pt on the importance of having a bed alarm but pt still declining. Charge RN, Mariely notified.

## 2024-06-12 NOTE — PROGRESS NOTES
Patient reports increased nausea and abdominal pain with GI soft diet. However, patient was able to tolerate crackers and milk without difficulty. PRNs given for pain and nausea, Dr. Luke notified by this RN.

## 2024-06-12 NOTE — CARE PLAN
The patient is Stable - Low risk of patient condition declining or worsening    Shift Goals  Clinical Goals: CIWA, anxiety control, and rest  Patient Goals: DC/ regualr diet  Family Goals: no family at bedside    Progress made toward(s) clinical / shift goals:  CIWA protocol in place. Anxiety controlled with ambulation and therapeutic communication. Pt slept intermittently throughout shift.     Problem: Optimal Care for Alcohol Withdrawal  Goal: Optimal Care for the alcohol withdrawal patient  Outcome: Progressing     Problem: Pain - Standard  Goal: Alleviation of pain or a reduction in pain to the patient’s comfort goal  Outcome: Progressing

## 2024-06-13 PROCEDURE — 700102 HCHG RX REV CODE 250 W/ 637 OVERRIDE(OP): Performed by: STUDENT IN AN ORGANIZED HEALTH CARE EDUCATION/TRAINING PROGRAM

## 2024-06-13 PROCEDURE — A9270 NON-COVERED ITEM OR SERVICE: HCPCS | Performed by: INTERNAL MEDICINE

## 2024-06-13 PROCEDURE — A9270 NON-COVERED ITEM OR SERVICE: HCPCS | Performed by: STUDENT IN AN ORGANIZED HEALTH CARE EDUCATION/TRAINING PROGRAM

## 2024-06-13 PROCEDURE — 99239 HOSP IP/OBS DSCHRG MGMT >30: CPT | Performed by: INTERNAL MEDICINE

## 2024-06-13 PROCEDURE — 700102 HCHG RX REV CODE 250 W/ 637 OVERRIDE(OP): Performed by: INTERNAL MEDICINE

## 2024-06-13 RX ADMIN — DIBASIC SODIUM PHOSPHATE, MONOBASIC POTASSIUM PHOSPHATE AND MONOBASIC SODIUM PHOSPHATE 250 MG: 852; 155; 130 TABLET ORAL at 00:06

## 2024-06-13 RX ADMIN — ACETAMINOPHEN 1000 MG: 500 TABLET, FILM COATED ORAL at 00:06

## 2024-06-13 NOTE — CARE PLAN
The patient is Stable - Low risk of patient condition declining or worsening    Shift Goals  Clinical Goals: tolerate diet, ambulation, and pain management  Patient Goals: DC and have a regular diet  Family Goals: no family at bedside    Progress made toward(s) clinical / shift goals:  pt continues to tolerate clear liquid diet. Pt eager to advance diet. Pain controlled per MAR. Pt ambulated at beginning of shift.   Pt slept intermittently throughout shift.    Problem: Optimal Care for Alcohol Withdrawal  Goal: Optimal Care for the alcohol withdrawal patient  Outcome: Progressing     Problem: Pain - Standard  Goal: Alleviation of pain or a reduction in pain to the patient’s comfort goal  Outcome: Progressing

## 2024-06-13 NOTE — DISCHARGE SUMMARY
Discharge Summary  AGAINST MEDICAL ADVICE  CHIEF COMPLAINT ON ADMISSION  No chief complaint on file.      Reason for Admission  Alcoholic acute pancreatitis    Admission Date  6/9/2024    CODE STATUS  Full code    HPI & HOSPITAL COURSE    39-year-old female with history of heavy drinking alcohol and meth abuse who presented 6/9 with abdominal pain. Patient has been drinking half a pint of vodka every day. She last used methamphetamine 1 week ago and last drank alcohol 3 days before the admission, patient began to have a squeezing epigastric abdominal pain associate with nausea and multiple episodes of vomiting couple days before the admission. She denies diarrhea fever chills. Initially patient was evaluated at outside facility where they found elevated liver enzymes AST more than ALT, creatinine 0.6 with platelets 12 and hemoglobin around 9.5. Ultrasound showed distended gallbladder with large amount of sludge possible early acute cholecystitis. Also patient underwent CTA of her abdomen and showed fatty and enlarged liver only, patient was transferred to our facility for higher level of care, IV fluid with history of protocol were initiated, patient underwent HIDA scan which did not show any signs of cholecystitis.  Patient tolerated her diet however on 6/12 patient received cheese and chicken and had some worsening abdominal pain, also she had worsening elevated liver enzymes, CT scan was done and did not show any obstruction or severe pancreatitis or complication, resume her regular diet and IV fluid however patient decided to leave AGAINST MEDICAL ADVICE late on 6/12, patient felt better and decided to leave.  The medication will be sent to her pharmacy.      The patient met 2-midnight criteria for an inpatient stay at the time of discharge.    Discharge Date  6/12/2024 AGAINST MEDICAL ADVICE    FOLLOW UP ITEMS POST DISCHARGE  Follow-up with PCP for alcoholism and drug abuse      DISCHARGE DIAGNOSES  Principal  Problem:    Alcohol-induced acute pancreatitis without infection or necrosis (POA: Unknown)  Active Problems:    Intractable abdominal pain (POA: Yes)    Pancytopenia (HCC) (POA: Unknown)    Thrombocytopenia (HCC) (POA: Unknown)    Gallbladder sludge (POA: Unknown)    Uncomplicated alcohol dependence (HCC) (POA: Unknown)    Methamphetamine abuse (HCC) (POA: Yes)  Resolved Problems:    Acute cholecystitis (POA: Yes)      FOLLOW UP  Christian German D.O.  [facility] [id number] [phone]    Follow up in 1 week(s)        MEDICATIONS ON DISCHARGE     Medication List        START taking these medications        Instructions   omeprazole 20 MG delayed-release capsule  Commonly known as: PriLOSEC   Take 1 Capsule by mouth 2 times a day.  Dose: 20 mg     thiamine 100 MG tablet  Commonly known as: Thiamine   Take 1 Tablet by mouth 2 times a day.  Dose: 100 mg            STOP taking these medications      ibuprofen 200 MG Tabs  Commonly known as: Motrin              Allergies  Allergies   Allergen Reactions    Peanut-Derived Swelling     Tongue swelling    Tree Nuts Food Allergy Swelling     All nuts        DIET  No orders of the defined types were placed in this encounter.      ACTIVITY  As tolerated.  Weight bearing as tolerated    CONSULTATIONS  None    PROCEDURES  None    LABORATORY  Lab Results   Component Value Date    SODIUM 136 06/12/2024    POTASSIUM 3.6 06/12/2024    CHLORIDE 97 06/12/2024    CO2 25 06/12/2024    GLUCOSE 143 (H) 06/12/2024    BUN 2 (L) 06/12/2024    CREATININE 0.17 (L) 06/12/2024        Lab Results   Component Value Date    WBC 3.5 (L) 06/12/2024    HEMOGLOBIN 11.3 (L) 06/12/2024    HEMATOCRIT 32.8 (L) 06/12/2024    PLATELETCT 52 (L) 06/12/2024        Total time of the discharge process exceeds 35 minutes.

## 2024-06-13 NOTE — PROGRESS NOTES
Pt informed this RN that she is leaving AMA. This RN educated pt on the risks of leaving but despite education pt still would like to leave AMA. This RN ensured pt had a ride available to pick her up. This RN watched pt walk off unit not in distress and with all belongings. SKIP Roach notified that pt is leaving AMA

## 2024-06-13 NOTE — PROGRESS NOTES
Bedside report received, assessment completed     A&O x  4, pt calls appropriately  Mobility: Up self  Fall Risk Assessment: Moderate, bed alarm refused, door notifications in use  Pain Assessment / Reassessment completed, medication provided per MAR  Diet: Clears  LDA:   IV Access: 22 R FA, CDI/ flushed/ Infusing per MAR     GI/: + void, + flatus, 6/12 BM  DVT Prophylaxis: SCD's refused     Reviewed plan of care with patient, bed in lowest position and locked, pt resting comfortably now, call light within reach, all needs met at this time. Interventions will be executed per plan of care

## 2024-06-14 LAB
BACTERIA BLD CULT: NORMAL
BACTERIA BLD CULT: NORMAL
SIGNIFICANT IND 70042: NORMAL
SIGNIFICANT IND 70042: NORMAL
SITE SITE: NORMAL
SITE SITE: NORMAL
SOURCE SOURCE: NORMAL
SOURCE SOURCE: NORMAL

## 2024-11-10 ENCOUNTER — HOSPITAL ENCOUNTER (INPATIENT)
Facility: MEDICAL CENTER | Age: 39
LOS: 3 days | DRG: 377 | End: 2024-11-13
Attending: INTERNAL MEDICINE | Admitting: HOSPITALIST
Payer: MEDICAID

## 2024-11-10 DIAGNOSIS — R10.9 INTRACTABLE ABDOMINAL PAIN: ICD-10-CM

## 2024-11-10 DIAGNOSIS — F10.29 ALCOHOL DEPENDENCE WITH UNSPECIFIED ALCOHOL-INDUCED DISORDER (HCC): ICD-10-CM

## 2024-11-10 DIAGNOSIS — K85.20 ALCOHOL-INDUCED ACUTE PANCREATITIS WITHOUT INFECTION OR NECROSIS: ICD-10-CM

## 2024-11-10 DIAGNOSIS — R10.9 ABDOMINAL PAIN, UNSPECIFIED ABDOMINAL LOCATION: ICD-10-CM

## 2024-11-10 DIAGNOSIS — K82.8 GALLBLADDER SLUDGE: ICD-10-CM

## 2024-11-10 PROBLEM — K86.89 PANCREATIC MASS: Status: ACTIVE | Noted: 2024-11-10

## 2024-11-10 PROBLEM — D64.9 ANEMIA: Status: ACTIVE | Noted: 2024-11-10

## 2024-11-10 PROBLEM — F10.20 ALCOHOL DEPENDENCE (HCC): Status: ACTIVE | Noted: 2024-11-10

## 2024-11-10 PROBLEM — E87.1 HYPONATREMIA: Status: ACTIVE | Noted: 2024-11-10

## 2024-11-10 PROBLEM — D72.819 LEUKOPENIA: Status: ACTIVE | Noted: 2024-11-10

## 2024-11-10 LAB
ALBUMIN SERPL BCP-MCNC: 3.3 G/DL (ref 3.2–4.9)
ALBUMIN/GLOB SERPL: 1.1 G/DL
ALP SERPL-CCNC: 82 U/L (ref 30–99)
ALT SERPL-CCNC: 46 U/L (ref 2–50)
ANION GAP SERPL CALC-SCNC: 14 MMOL/L (ref 7–16)
AST SERPL-CCNC: 71 U/L (ref 12–45)
BASOPHILS # BLD AUTO: 0.2 % (ref 0–1.8)
BASOPHILS # BLD: 0.01 K/UL (ref 0–0.12)
BILIRUB SERPL-MCNC: 0.7 MG/DL (ref 0.1–1.5)
BUN SERPL-MCNC: 9 MG/DL (ref 8–22)
CALCIUM ALBUM COR SERPL-MCNC: 8.7 MG/DL (ref 8.5–10.5)
CALCIUM SERPL-MCNC: 8.1 MG/DL (ref 8.4–10.2)
CHLORIDE SERPL-SCNC: 100 MMOL/L (ref 96–112)
CO2 SERPL-SCNC: 20 MMOL/L (ref 20–33)
CREAT SERPL-MCNC: 0.32 MG/DL (ref 0.5–1.4)
EOSINOPHIL # BLD AUTO: 0.08 K/UL (ref 0–0.51)
EOSINOPHIL NFR BLD: 1.8 % (ref 0–6.9)
ERYTHROCYTE [DISTWIDTH] IN BLOOD BY AUTOMATED COUNT: 55 FL (ref 35.9–50)
GFR SERPLBLD CREATININE-BSD FMLA CKD-EPI: 134 ML/MIN/1.73 M 2
GLOBULIN SER CALC-MCNC: 2.9 G/DL (ref 1.9–3.5)
GLUCOSE SERPL-MCNC: 132 MG/DL (ref 65–99)
HCT VFR BLD AUTO: 29.1 % (ref 37–47)
HGB BLD-MCNC: 9.6 G/DL (ref 12–16)
IMM GRANULOCYTES # BLD AUTO: 0.05 K/UL (ref 0–0.11)
IMM GRANULOCYTES NFR BLD AUTO: 1.1 % (ref 0–0.9)
LACTATE SERPL-SCNC: 0.7 MMOL/L (ref 0.5–2)
LYMPHOCYTES # BLD AUTO: 1.54 K/UL (ref 1–4.8)
LYMPHOCYTES NFR BLD: 34 % (ref 22–41)
MAGNESIUM SERPL-MCNC: 1.4 MG/DL (ref 1.5–2.5)
MCH RBC QN AUTO: 31 PG (ref 27–33)
MCHC RBC AUTO-ENTMCNC: 33 G/DL (ref 32.2–35.5)
MCV RBC AUTO: 93.9 FL (ref 81.4–97.8)
MONOCYTES # BLD AUTO: 0.73 K/UL (ref 0–0.85)
MONOCYTES NFR BLD AUTO: 16.1 % (ref 0–13.4)
NEUTROPHILS # BLD AUTO: 2.12 K/UL (ref 1.82–7.42)
NEUTROPHILS NFR BLD: 46.8 % (ref 44–72)
NRBC # BLD AUTO: 0 K/UL
NRBC BLD-RTO: 0 /100 WBC (ref 0–0.2)
NT-PROBNP SERPL IA-MCNC: 188 PG/ML (ref 0–125)
PLATELET # BLD AUTO: 88 K/UL (ref 164–446)
PLATELET BLD QL SMEAR: NORMAL
PLATELETS.RETICULATED NFR BLD AUTO: 12.3 % (ref 0.6–13.1)
PMV BLD AUTO: 11.3 FL (ref 9–12.9)
POTASSIUM SERPL-SCNC: 3.7 MMOL/L (ref 3.6–5.5)
PROT SERPL-MCNC: 6.2 G/DL (ref 6–8.2)
RBC # BLD AUTO: 3.1 M/UL (ref 4.2–5.4)
RBC BLD AUTO: NORMAL
SODIUM SERPL-SCNC: 134 MMOL/L (ref 135–145)
TROPONIN T SERPL-MCNC: <6 NG/L (ref 6–19)
WBC # BLD AUTO: 4.5 K/UL (ref 4.8–10.8)

## 2024-11-10 PROCEDURE — 85055 RETICULATED PLATELET ASSAY: CPT

## 2024-11-10 PROCEDURE — 770001 HCHG ROOM/CARE - MED/SURG/GYN PRIV*

## 2024-11-10 PROCEDURE — 99223 1ST HOSP IP/OBS HIGH 75: CPT | Performed by: HOSPITALIST

## 2024-11-10 PROCEDURE — 83880 ASSAY OF NATRIURETIC PEPTIDE: CPT

## 2024-11-10 PROCEDURE — 84484 ASSAY OF TROPONIN QUANT: CPT

## 2024-11-10 PROCEDURE — 83605 ASSAY OF LACTIC ACID: CPT

## 2024-11-10 PROCEDURE — 700105 HCHG RX REV CODE 258: Performed by: HOSPITALIST

## 2024-11-10 PROCEDURE — 700102 HCHG RX REV CODE 250 W/ 637 OVERRIDE(OP): Performed by: HOSPITALIST

## 2024-11-10 PROCEDURE — 36415 COLL VENOUS BLD VENIPUNCTURE: CPT

## 2024-11-10 PROCEDURE — 700111 HCHG RX REV CODE 636 W/ 250 OVERRIDE (IP): Performed by: HOSPITALIST

## 2024-11-10 PROCEDURE — 80053 COMPREHEN METABOLIC PANEL: CPT

## 2024-11-10 PROCEDURE — 94760 N-INVAS EAR/PLS OXIMETRY 1: CPT

## 2024-11-10 PROCEDURE — A9270 NON-COVERED ITEM OR SERVICE: HCPCS | Performed by: HOSPITALIST

## 2024-11-10 PROCEDURE — 85025 COMPLETE CBC W/AUTO DIFF WBC: CPT

## 2024-11-10 PROCEDURE — 83735 ASSAY OF MAGNESIUM: CPT

## 2024-11-10 RX ORDER — LORAZEPAM 1 MG/1
1 TABLET ORAL EVERY 4 HOURS PRN
Status: DISCONTINUED | OUTPATIENT
Start: 2024-11-10 | End: 2024-11-13 | Stop reason: HOSPADM

## 2024-11-10 RX ORDER — LORAZEPAM 2 MG/ML
2 INJECTION INTRAMUSCULAR
Status: DISCONTINUED | OUTPATIENT
Start: 2024-11-10 | End: 2024-11-13 | Stop reason: HOSPADM

## 2024-11-10 RX ORDER — FOLIC ACID 1 MG/1
1 TABLET ORAL DAILY
Status: COMPLETED | OUTPATIENT
Start: 2024-11-10 | End: 2024-11-13

## 2024-11-10 RX ORDER — SODIUM CHLORIDE 9 MG/ML
INJECTION, SOLUTION INTRAVENOUS CONTINUOUS
Status: DISPENSED | OUTPATIENT
Start: 2024-11-10 | End: 2024-11-11

## 2024-11-10 RX ORDER — LORAZEPAM 2 MG/ML
1 INJECTION INTRAMUSCULAR
Status: DISCONTINUED | OUTPATIENT
Start: 2024-11-10 | End: 2024-11-13 | Stop reason: HOSPADM

## 2024-11-10 RX ORDER — LORAZEPAM 1 MG/1
3 TABLET ORAL
Status: DISCONTINUED | OUTPATIENT
Start: 2024-11-10 | End: 2024-11-13 | Stop reason: HOSPADM

## 2024-11-10 RX ORDER — GAUZE BANDAGE 2" X 2"
100 BANDAGE TOPICAL DAILY
Status: COMPLETED | OUTPATIENT
Start: 2024-11-10 | End: 2024-11-13

## 2024-11-10 RX ORDER — LORAZEPAM 0.5 MG/1
0.5 TABLET ORAL EVERY 4 HOURS PRN
Status: DISCONTINUED | OUTPATIENT
Start: 2024-11-10 | End: 2024-11-13 | Stop reason: HOSPADM

## 2024-11-10 RX ORDER — PROCHLORPERAZINE EDISYLATE 5 MG/ML
5-10 INJECTION INTRAMUSCULAR; INTRAVENOUS EVERY 4 HOURS PRN
Status: DISCONTINUED | OUTPATIENT
Start: 2024-11-10 | End: 2024-11-13 | Stop reason: HOSPADM

## 2024-11-10 RX ORDER — LORAZEPAM 2 MG/ML
0.5 INJECTION INTRAMUSCULAR EVERY 4 HOURS PRN
Status: DISCONTINUED | OUTPATIENT
Start: 2024-11-10 | End: 2024-11-13 | Stop reason: HOSPADM

## 2024-11-10 RX ORDER — AMOXICILLIN 250 MG
2 CAPSULE ORAL EVERY EVENING
Status: DISCONTINUED | OUTPATIENT
Start: 2024-11-10 | End: 2024-11-13 | Stop reason: HOSPADM

## 2024-11-10 RX ORDER — LORAZEPAM 1 MG/1
2 TABLET ORAL
Status: DISCONTINUED | OUTPATIENT
Start: 2024-11-10 | End: 2024-11-13 | Stop reason: HOSPADM

## 2024-11-10 RX ORDER — ONDANSETRON 2 MG/ML
4 INJECTION INTRAMUSCULAR; INTRAVENOUS EVERY 4 HOURS PRN
Status: DISCONTINUED | OUTPATIENT
Start: 2024-11-10 | End: 2024-11-13 | Stop reason: HOSPADM

## 2024-11-10 RX ORDER — POLYETHYLENE GLYCOL 3350 17 G/17G
1 POWDER, FOR SOLUTION ORAL
Status: DISCONTINUED | OUTPATIENT
Start: 2024-11-10 | End: 2024-11-13 | Stop reason: HOSPADM

## 2024-11-10 RX ORDER — ONDANSETRON 4 MG/1
4 TABLET, ORALLY DISINTEGRATING ORAL EVERY 4 HOURS PRN
Status: DISCONTINUED | OUTPATIENT
Start: 2024-11-10 | End: 2024-11-13 | Stop reason: HOSPADM

## 2024-11-10 RX ORDER — OXYCODONE HYDROCHLORIDE 5 MG/1
5 TABLET ORAL
Status: DISCONTINUED | OUTPATIENT
Start: 2024-11-10 | End: 2024-11-12

## 2024-11-10 RX ORDER — MAGNESIUM SULFATE HEPTAHYDRATE 40 MG/ML
2 INJECTION, SOLUTION INTRAVENOUS ONCE
Status: COMPLETED | OUTPATIENT
Start: 2024-11-10 | End: 2024-11-11

## 2024-11-10 RX ORDER — PROMETHAZINE HYDROCHLORIDE 25 MG/1
12.5-25 TABLET ORAL EVERY 4 HOURS PRN
Status: DISCONTINUED | OUTPATIENT
Start: 2024-11-10 | End: 2024-11-13 | Stop reason: HOSPADM

## 2024-11-10 RX ORDER — OXYCODONE HYDROCHLORIDE 10 MG/1
10 TABLET ORAL
Status: DISCONTINUED | OUTPATIENT
Start: 2024-11-10 | End: 2024-11-12

## 2024-11-10 RX ORDER — ACETAMINOPHEN 325 MG/1
650 TABLET ORAL EVERY 6 HOURS PRN
Status: DISCONTINUED | OUTPATIENT
Start: 2024-11-10 | End: 2024-11-13 | Stop reason: HOSPADM

## 2024-11-10 RX ORDER — LORAZEPAM 1 MG/1
4 TABLET ORAL
Status: DISCONTINUED | OUTPATIENT
Start: 2024-11-10 | End: 2024-11-13 | Stop reason: HOSPADM

## 2024-11-10 RX ORDER — PANTOPRAZOLE SODIUM 40 MG/10ML
40 INJECTION, POWDER, LYOPHILIZED, FOR SOLUTION INTRAVENOUS 2 TIMES DAILY
Status: DISCONTINUED | OUTPATIENT
Start: 2024-11-10 | End: 2024-11-13 | Stop reason: HOSPADM

## 2024-11-10 RX ORDER — LORAZEPAM 2 MG/ML
1.5 INJECTION INTRAMUSCULAR
Status: DISCONTINUED | OUTPATIENT
Start: 2024-11-10 | End: 2024-11-13 | Stop reason: HOSPADM

## 2024-11-10 RX ORDER — HYDROMORPHONE HYDROCHLORIDE 1 MG/ML
0.5 INJECTION, SOLUTION INTRAMUSCULAR; INTRAVENOUS; SUBCUTANEOUS
Status: DISCONTINUED | OUTPATIENT
Start: 2024-11-10 | End: 2024-11-12

## 2024-11-10 RX ORDER — PROMETHAZINE HYDROCHLORIDE 25 MG/1
12.5-25 SUPPOSITORY RECTAL EVERY 4 HOURS PRN
Status: DISCONTINUED | OUTPATIENT
Start: 2024-11-10 | End: 2024-11-13 | Stop reason: HOSPADM

## 2024-11-10 RX ADMIN — Medication 100 MG: at 14:21

## 2024-11-10 RX ADMIN — MULTIVITAMIN TABLET 1 TABLET: TABLET at 14:21

## 2024-11-10 RX ADMIN — PANTOPRAZOLE SODIUM 40 MG: 40 INJECTION, POWDER, FOR SOLUTION INTRAVENOUS at 17:10

## 2024-11-10 RX ADMIN — OXYCODONE HYDROCHLORIDE 10 MG: 10 TABLET ORAL at 21:30

## 2024-11-10 RX ADMIN — SODIUM CHLORIDE: 9 INJECTION, SOLUTION INTRAVENOUS at 23:00

## 2024-11-10 RX ADMIN — FOLIC ACID 1 MG: 1 TABLET ORAL at 14:21

## 2024-11-10 RX ADMIN — MAGNESIUM SULFATE HEPTAHYDRATE 2 G: 2 INJECTION, SOLUTION INTRAVENOUS at 23:02

## 2024-11-10 RX ADMIN — OXYCODONE 5 MG: 5 TABLET ORAL at 14:21

## 2024-11-10 SDOH — ECONOMIC STABILITY: TRANSPORTATION INSECURITY
IN THE PAST 12 MONTHS, HAS THE LACK OF TRANSPORTATION KEPT YOU FROM MEDICAL APPOINTMENTS OR FROM GETTING MEDICATIONS?: NO

## 2024-11-10 SDOH — ECONOMIC STABILITY: TRANSPORTATION INSECURITY
IN THE PAST 12 MONTHS, HAS LACK OF RELIABLE TRANSPORTATION KEPT YOU FROM MEDICAL APPOINTMENTS, MEETINGS, WORK OR FROM GETTING THINGS NEEDED FOR DAILY LIVING?: NO

## 2024-11-10 ASSESSMENT — LIFESTYLE VARIABLES
TOTAL SCORE: 4
DOES PATIENT WANT TO TALK TO SOMEONE ABOUT QUITTING: NO
NAUSEA AND VOMITING: NO NAUSEA AND NO VOMITING
EVER HAD A DRINK FIRST THING IN THE MORNING TO STEADY YOUR NERVES TO GET RID OF A HANGOVER: YES
AGITATION: NORMAL ACTIVITY
AGITATION: NORMAL ACTIVITY
HAVE PEOPLE ANNOYED YOU BY CRITICIZING YOUR DRINKING: YES
NAUSEA AND VOMITING: NO NAUSEA AND NO VOMITING
VISUAL DISTURBANCES: NOT PRESENT
ANXIETY: NO ANXIETY (AT EASE)
HEADACHE, FULLNESS IN HEAD: NOT PRESENT
TOTAL SCORE: 4
AGITATION: NORMAL ACTIVITY
TREMOR: NO TREMOR
HAVE YOU EVER FELT YOU SHOULD CUT DOWN ON YOUR DRINKING: YES
HEADACHE, FULLNESS IN HEAD: NOT PRESENT
ANXIETY: NO ANXIETY (AT EASE)
TOTAL SCORE: 0
EVER FELT BAD OR GUILTY ABOUT YOUR DRINKING: YES
DOES PATIENT WANT TO STOP DRINKING: YES
AVERAGE NUMBER OF DAYS PER WEEK YOU HAVE A DRINK CONTAINING ALCOHOL: 7
TOTAL SCORE: 0
TOTAL SCORE: 4
VISUAL DISTURBANCES: NOT PRESENT
ANXIETY: NO ANXIETY (AT EASE)
ALCOHOL_USE: YES
PAROXYSMAL SWEATS: NO SWEAT VISIBLE
ORIENTATION AND CLOUDING OF SENSORIUM: ORIENTED AND CAN DO SERIAL ADDITIONS
TOTAL SCORE: 0
VISUAL DISTURBANCES: NOT PRESENT
PAROXYSMAL SWEATS: NO SWEAT VISIBLE
TREMOR: NO TREMOR
CONSUMPTION TOTAL: POSITIVE
AUDITORY DISTURBANCES: NOT PRESENT
HEADACHE, FULLNESS IN HEAD: NOT PRESENT
AUDITORY DISTURBANCES: NOT PRESENT
HOW MANY TIMES IN THE PAST YEAR HAVE YOU HAD 5 OR MORE DRINKS IN A DAY: 52
PAROXYSMAL SWEATS: NO SWEAT VISIBLE
ORIENTATION AND CLOUDING OF SENSORIUM: ORIENTED AND CAN DO SERIAL ADDITIONS
NAUSEA AND VOMITING: NO NAUSEA AND NO VOMITING
ORIENTATION AND CLOUDING OF SENSORIUM: ORIENTED AND CAN DO SERIAL ADDITIONS
ON A TYPICAL DAY WHEN YOU DRINK ALCOHOL HOW MANY DRINKS DO YOU HAVE: 3
TREMOR: NO TREMOR
AUDITORY DISTURBANCES: NOT PRESENT

## 2024-11-10 ASSESSMENT — ENCOUNTER SYMPTOMS
FOCAL WEAKNESS: 0
CHILLS: 0
BRUISES/BLEEDS EASILY: 0
NAUSEA: 1
FLANK PAIN: 0
EYE REDNESS: 0
MYALGIAS: 0
NERVOUS/ANXIOUS: 0
SHORTNESS OF BREATH: 0
FEVER: 0
EYE DISCHARGE: 0
VOMITING: 0
COUGH: 0
STRIDOR: 0
ABDOMINAL PAIN: 1

## 2024-11-10 ASSESSMENT — SOCIAL DETERMINANTS OF HEALTH (SDOH)
IN THE PAST 12 MONTHS, HAS THE ELECTRIC, GAS, OIL, OR WATER COMPANY THREATENED TO SHUT OFF SERVICE IN YOUR HOME?: NO
WITHIN THE LAST YEAR, HAVE YOU BEEN HUMILIATED OR EMOTIONALLY ABUSED IN OTHER WAYS BY YOUR PARTNER OR EX-PARTNER?: NO
WITHIN THE PAST 12 MONTHS, THE FOOD YOU BOUGHT JUST DIDN'T LAST AND YOU DIDN'T HAVE MONEY TO GET MORE: NEVER TRUE
WITHIN THE LAST YEAR, HAVE TO BEEN RAPED OR FORCED TO HAVE ANY KIND OF SEXUAL ACTIVITY BY YOUR PARTNER OR EX-PARTNER?: NO
WITHIN THE LAST YEAR, HAVE YOU BEEN AFRAID OF YOUR PARTNER OR EX-PARTNER?: NO
WITHIN THE LAST YEAR, HAVE YOU BEEN KICKED, HIT, SLAPPED, OR OTHERWISE PHYSICALLY HURT BY YOUR PARTNER OR EX-PARTNER?: NO
WITHIN THE PAST 12 MONTHS, YOU WORRIED THAT YOUR FOOD WOULD RUN OUT BEFORE YOU GOT THE MONEY TO BUY MORE: NEVER TRUE

## 2024-11-10 ASSESSMENT — COGNITIVE AND FUNCTIONAL STATUS - GENERAL
SUGGESTED CMS G CODE MODIFIER DAILY ACTIVITY: CH
SUGGESTED CMS G CODE MODIFIER MOBILITY: CH
MOBILITY SCORE: 24
DAILY ACTIVITIY SCORE: 24

## 2024-11-10 ASSESSMENT — FIBROSIS 4 INDEX: FIB4 SCORE: 49.66

## 2024-11-10 ASSESSMENT — PAIN DESCRIPTION - PAIN TYPE
TYPE: ACUTE PAIN

## 2024-11-10 ASSESSMENT — PATIENT HEALTH QUESTIONNAIRE - PHQ9
2. FEELING DOWN, DEPRESSED, IRRITABLE, OR HOPELESS: NOT AT ALL
SUM OF ALL RESPONSES TO PHQ9 QUESTIONS 1 AND 2: 0
1. LITTLE INTEREST OR PLEASURE IN DOING THINGS: NOT AT ALL

## 2024-11-10 NOTE — H&P
Hospital Medicine History & Physical Note    Date of Service  11/10/2024    Primary Care Physician  Christian German D.O.    Consultants  None     Code Status  Full Code    Chief Complaint  Abnormal CT imaging concerning for possible pancreatic mass     History of Presenting Illness  Brenda Cartagena is a 39 y.o. female with a past medical history of alcohol dependence who presented as a direct admission on 11/10/2024 with abnormal CT imaging concerning for possible pancreatic mass, versus pseudocyst.  Apparently the patient has multiple hospitalizations/emergency visits for alcohol withdrawal with and frequently leaves AGAINST MEDICAL ADVICE.  The patient has been having worsening abdominal pain.  The pain is in the epigastric region.  She denies having fevers or chills.     I discussed the plan of care with the patient, [facility] nursing staff.     Review of Systems  Review of Systems   Constitutional:  Negative for chills and fever.   Eyes:  Negative for discharge and redness.   Respiratory:  Negative for cough, shortness of breath and stridor.    Cardiovascular:  Negative for chest pain and leg swelling.   Gastrointestinal:  Positive for abdominal pain and nausea. Negative for vomiting.   Genitourinary:  Negative for flank pain.   Musculoskeletal:  Negative for myalgias.   Skin: Negative.    Neurological:  Negative for focal weakness.   Endo/Heme/Allergies:  Does not bruise/bleed easily.   Psychiatric/Behavioral:  The patient is not nervous/anxious.      Past Medical History   has a past medical history of Indigestion (13 yrears).    Surgical History   has a past surgical history that includes incision and drainage orthopedic (5/9/2012); external fixator application (5/9/2012); tibia nailing intramedullary (5/11/2012); external fixator removal (5/11/2012); fasciotomy (5/13/2012); wound closure delayed (5/16/2012); irrigation & debridement ortho (5/18/2012); wound closure ortho (5/18/2012); orif, fracture, tibia  (9/5/2012); and tonsillectomy (Bilateral, 7/21/2015).     Family History  family history is not on file.      Social History   reports that she has been smoking cigarettes. She has a 1 pack-year smoking history. She does not have any smokeless tobacco history on file. She reports current alcohol use. She reports current drug use. Drug: Inhaled.    Allergies  Allergies   Allergen Reactions    Peanut-Derived Swelling     Tongue swelling    Tree Nuts Food Allergy Swelling     All nuts      Medications  Prior to Admission Medications   Prescriptions Last Dose Informant Patient Reported? Taking?   omeprazole (PRILOSEC) 20 MG delayed-release capsule Not Taking  No No   Sig: Take 1 Capsule by mouth 2 times a day.   Patient not taking: Reported on 11/10/2024   thiamine (THIAMINE) 100 MG tablet Not Taking  No No   Sig: Take 1 Tablet by mouth 2 times a day.   Patient not taking: Reported on 11/10/2024      Facility-Administered Medications: None     Physical Exam  Temp:  [36.2 °C (97.1 °F)-36.8 °C (98.2 °F)] 36.2 °C (97.1 °F)  Pulse:  [] 102  Resp:  [16-18] 16  BP: ()/(58-71) 108/71  SpO2:  [94 %-96 %] 96 %  Blood Pressure: 104/58   Temperature: 36.7 °C (98.1 °F)   Pulse: 93   Respiration: 18   Pulse Oximetry: 95 %     Physical Exam  Constitutional:       General: She is not in acute distress.  HENT:      Head: Normocephalic and atraumatic.      Right Ear: External ear normal.      Left Ear: External ear normal.      Nose: No congestion or rhinorrhea.      Mouth/Throat:      Mouth: Mucous membranes are dry.      Pharynx: No oropharyngeal exudate or posterior oropharyngeal erythema.   Eyes:      General: No scleral icterus.        Right eye: No discharge.         Left eye: No discharge.      Conjunctiva/sclera: Conjunctivae normal.      Pupils: Pupils are equal, round, and reactive to light.   Cardiovascular:      Rate and Rhythm: Normal rate and regular rhythm.      Heart sounds:      No friction rub. No gallop.    Pulmonary:      Effort: Pulmonary effort is normal.   Abdominal:      General: Abdomen is flat. There is no distension.      Tenderness: There is abdominal tenderness. There is no guarding or rebound.   Musculoskeletal:         General: No swelling.      Cervical back: Neck supple. No rigidity. No muscular tenderness.      Right lower leg: No edema.      Left lower leg: No edema.   Skin:     General: Skin is dry.      Capillary Refill: Capillary refill takes 2 to 3 seconds.      Coloration: Skin is pale. Skin is not jaundiced.      Findings: No bruising or erythema.   Neurological:      Mental Status: She is alert and oriented to person, place, and time.   Psychiatric:         Mood and Affect: Mood normal.         Judgment: Judgment normal.       Laboratory:  Recent Labs     11/10/24  1535   WBC 4.5*   RBC 3.10*   HEMOGLOBIN 9.6*   HEMATOCRIT 29.1*   MCV 93.9   MCH 31.0   MCHC 33.0   RDW 55.0*   PLATELETCT 88*   MPV 11.3     Recent Labs     11/10/24  1535   SODIUM 134*   POTASSIUM 3.7   CHLORIDE 100   CO2 20   GLUCOSE 132*   BUN 9   CREATININE 0.32*   CALCIUM 8.1*     Recent Labs     11/10/24  1535   ALTSGPT 46   ASTSGOT 71*   ALKPHOSPHAT 82   TBILIRUBIN 0.7   GLUCOSE 132*         Recent Labs     11/10/24  1535   NTPROBNP 188*         Recent Labs     11/10/24  1535   TROPONINT <6     Imaging:  US-OUTSIDE IMAGES-US ABDOMEN   Final Result      CT-OUTSIDE IMAGES-CT ABDOMEN/PELVIS   Final Result      US-OUTSIDE IMAGES-US ABDOMEN   Final Result      CT-OUTSIDE IMAGES-CT ABDOMEN/PELVIS    (Results Pending)   [id number]    (Results Pending)     Assessment/Plan:  Justification for Admission Status  I anticipate this patient will require at least two midnights for appropriate medical management, necessitating inpatient admission because patient has intractable abdominal pain with abnormal imaging concerning for pancreatic mass versus pseudocyst.  Will require multimodal pain control and further workup including MRCP  and possibly followed by ERCP and EUS.    Patient will need a Med/Surg bed on MEDICAL service.      * Intractable abdominal pain- (present on admission)  Assessment & Plan  Likely multifactorial secondary to alcoholic gastritis, chronic pancreatitis and possible pancreatic mass  I will start intravenous PPI.  Multimodal pain control  CT imaging at the transferring facility are concerning for possible pancreatic mass versus pseudocyst.  I will check MRCP for further evaluation.  Consider GI consult to consider EUS according to results     CT imaging are concerning for possible pancreatic mass versus pseudocyst- (present on admission)  Assessment & Plan  CT imaging at the transferring facility are concerning for possible pancreatic mass versus pseudocyst.  I will check MRCP for further evaluation.  Consider GI consult to consider EUS according to results    Leukopenia- (present on admission)  Assessment & Plan  Appears to be chronic.  Likely secondary to alcohol dependence.     Anemia- (present on admission)  Assessment & Plan  Appears to be chronic  No evidence of gross bleeding.  Monitor hemoglobin. I will order a follow-up CBC.  Transfuse for a hemoglobin of less than or equal to 7.     Hyponatremia- (present on admission)  Assessment & Plan  Hypovolemic hyponatremia  I expect Na to improve with IVF.     Alcohol dependence (HCC)- (present on admission)  Assessment & Plan  I will start on CIWA protocol   I will place on continuous cardiac monitoring  I will check K, Mg, Na, Ca. Monitor electrolytes and replace accordingly, particularly magnesium, potassium and phosphorus  I will order fall, seizure, & aspiration precautions.  I will order thiamine folic acid and multivitamin.  Counseling      Thrombocytopenia (HCC)- (present on admission)  Assessment & Plan  Appears to be chronic.  Likely secondary to alcohol dependence  No evidence of gross bleeding at this point.  Continue to monitor platelet count.  I will order  a follow-up CBC.        VTE prophylaxis: SCDs/TEDs

## 2024-11-10 NOTE — PROGRESS NOTES
[facility] TRIAGE OFFICER DIRECT ADMISSION REPORT      Transferring facility: [facility]  Transferring physician:  Dr Malave  Transferring facility/physician contact number:    Chief complaint: Abdominal pain  Pertinent history & patient course:     The patient is a 39-year-old female with a past medical history of alcohol abuse.  It seems the patient has been hospitalized multiple times for alcohol withdrawal symptoms.  I spoke to the ER physician over from transferring facility it seems the patient was actually hospitalized for alcohol withdrawal symptoms and she left AMA yesterday to go eat with family, however she came back to the hospital due to worsening abdominal pain.  As per the ER physician the patient has intractable abdominal pain requiring IV pain medications.  The patient had a CT scan which showed pancreatic duct dilation concerning for mass versus stone.  As per ER physician AST is 94, ALT is within normal limits, bilirubin is 1.1, alkaline phosphatase is 96.  White blood cell count is 5.6, as per ER physician alcohol level is within normal limits.  The patient is being transferred to [facility] for MRCP possible ERCP and GI consult.        Code Status: Code Status will be confirmed upon patient arriving to [facility].     Patient accepted for transfer: Yes    Consultants to be called upon arrival: Patient will require MRCP and possible GI consult.    Admission status: Inpatient.  Floor requested: Medical        Please inform the triage officer upon arrival of the patient to [facility] for assignment of a hospitalist to perform admission.     For any question or concerns regarding the care of this patient, please reach out to the assigned hospitalist.

## 2024-11-10 NOTE — PROGRESS NOTES
Assumed care of patient. Patient brought in by [facility]. Last drink 6 days ago. Reports was at her local hospital and was detoxing there.  Pt resting comfortably in bed with no signs of distress noted. Breathing even and unlabored. 6/10 pain, Will review pain medication interventions. Patient reports no further needs at this time. Call light within reach. Bed locked in lowest position. Plan of care on going.    MD notified. Med rec complete.

## 2024-11-11 ENCOUNTER — ANESTHESIA EVENT (OUTPATIENT)
Dept: SURGERY | Facility: MEDICAL CENTER | Age: 39
DRG: 377 | End: 2024-11-11
Payer: MEDICAID

## 2024-11-11 ENCOUNTER — APPOINTMENT (OUTPATIENT)
Dept: RADIOLOGY | Facility: MEDICAL CENTER | Age: 39
DRG: 377 | End: 2024-11-11
Attending: INTERNAL MEDICINE
Payer: MEDICAID

## 2024-11-11 PROBLEM — R73.9 HYPERGLYCEMIA: Status: ACTIVE | Noted: 2024-11-11

## 2024-11-11 LAB
ALBUMIN SERPL BCP-MCNC: 3 G/DL (ref 3.2–4.9)
ALBUMIN/GLOB SERPL: 1 G/DL
ALP SERPL-CCNC: 76 U/L (ref 30–99)
ALT SERPL-CCNC: 51 U/L (ref 2–50)
ANION GAP SERPL CALC-SCNC: 14 MMOL/L (ref 7–16)
AST SERPL-CCNC: 101 U/L (ref 12–45)
BILIRUB SERPL-MCNC: 0.5 MG/DL (ref 0.1–1.5)
BUN SERPL-MCNC: 9 MG/DL (ref 8–22)
CALCIUM ALBUM COR SERPL-MCNC: 8.9 MG/DL (ref 8.5–10.5)
CALCIUM SERPL-MCNC: 8.1 MG/DL (ref 8.4–10.2)
CHLORIDE SERPL-SCNC: 99 MMOL/L (ref 96–112)
CO2 SERPL-SCNC: 18 MMOL/L (ref 20–33)
CREAT SERPL-MCNC: 0.31 MG/DL (ref 0.5–1.4)
ERYTHROCYTE [DISTWIDTH] IN BLOOD BY AUTOMATED COUNT: 57.1 FL (ref 35.9–50)
EST. AVERAGE GLUCOSE BLD GHB EST-MCNC: 105 MG/DL
GFR SERPLBLD CREATININE-BSD FMLA CKD-EPI: 135 ML/MIN/1.73 M 2
GLOBULIN SER CALC-MCNC: 3.1 G/DL (ref 1.9–3.5)
GLUCOSE SERPL-MCNC: 136 MG/DL (ref 65–99)
HBA1C MFR BLD: 5.3 % (ref 4–5.6)
HCT VFR BLD AUTO: 29.2 % (ref 37–47)
HGB BLD-MCNC: 9.4 G/DL (ref 12–16)
MAGNESIUM SERPL-MCNC: 2.4 MG/DL (ref 1.5–2.5)
MCH RBC QN AUTO: 31.4 PG (ref 27–33)
MCHC RBC AUTO-ENTMCNC: 32.2 G/DL (ref 32.2–35.5)
MCV RBC AUTO: 97.7 FL (ref 81.4–97.8)
PHOSPHATE SERPL-MCNC: 2.8 MG/DL (ref 2.5–4.5)
PLATELET # BLD AUTO: 85 K/UL (ref 164–446)
PLATELETS.RETICULATED NFR BLD AUTO: 12.5 % (ref 0.6–13.1)
PMV BLD AUTO: 12.4 FL (ref 9–12.9)
POTASSIUM SERPL-SCNC: 4.2 MMOL/L (ref 3.6–5.5)
PROT SERPL-MCNC: 6.1 G/DL (ref 6–8.2)
RBC # BLD AUTO: 2.99 M/UL (ref 4.2–5.4)
SODIUM SERPL-SCNC: 131 MMOL/L (ref 135–145)
WBC # BLD AUTO: 3.4 K/UL (ref 4.8–10.8)

## 2024-11-11 PROCEDURE — 74183 MRI ABD W/O CNTR FLWD CNTR: CPT

## 2024-11-11 PROCEDURE — 80053 COMPREHEN METABOLIC PANEL: CPT

## 2024-11-11 PROCEDURE — 36415 COLL VENOUS BLD VENIPUNCTURE: CPT

## 2024-11-11 PROCEDURE — 700117 HCHG RX CONTRAST REV CODE 255: Mod: JZ | Performed by: INTERNAL MEDICINE

## 2024-11-11 PROCEDURE — 83735 ASSAY OF MAGNESIUM: CPT

## 2024-11-11 PROCEDURE — 83036 HEMOGLOBIN GLYCOSYLATED A1C: CPT

## 2024-11-11 PROCEDURE — 700111 HCHG RX REV CODE 636 W/ 250 OVERRIDE (IP): Performed by: INTERNAL MEDICINE

## 2024-11-11 PROCEDURE — A9270 NON-COVERED ITEM OR SERVICE: HCPCS | Performed by: HOSPITALIST

## 2024-11-11 PROCEDURE — 700105 HCHG RX REV CODE 258: Performed by: INTERNAL MEDICINE

## 2024-11-11 PROCEDURE — 85027 COMPLETE CBC AUTOMATED: CPT

## 2024-11-11 PROCEDURE — 700111 HCHG RX REV CODE 636 W/ 250 OVERRIDE (IP): Performed by: HOSPITALIST

## 2024-11-11 PROCEDURE — 99233 SBSQ HOSP IP/OBS HIGH 50: CPT | Performed by: INTERNAL MEDICINE

## 2024-11-11 PROCEDURE — 770001 HCHG ROOM/CARE - MED/SURG/GYN PRIV*

## 2024-11-11 PROCEDURE — 700102 HCHG RX REV CODE 250 W/ 637 OVERRIDE(OP): Performed by: HOSPITALIST

## 2024-11-11 PROCEDURE — 94760 N-INVAS EAR/PLS OXIMETRY 1: CPT

## 2024-11-11 PROCEDURE — A9579 GAD-BASE MR CONTRAST NOS,1ML: HCPCS | Mod: JZ | Performed by: INTERNAL MEDICINE

## 2024-11-11 PROCEDURE — 85055 RETICULATED PLATELET ASSAY: CPT

## 2024-11-11 PROCEDURE — 84100 ASSAY OF PHOSPHORUS: CPT

## 2024-11-11 RX ORDER — LORAZEPAM 2 MG/ML
0.5 INJECTION INTRAMUSCULAR
Status: COMPLETED | OUTPATIENT
Start: 2024-11-11 | End: 2024-11-11

## 2024-11-11 RX ORDER — SODIUM CHLORIDE, SODIUM LACTATE, POTASSIUM CHLORIDE, CALCIUM CHLORIDE 600; 310; 30; 20 MG/100ML; MG/100ML; MG/100ML; MG/100ML
INJECTION, SOLUTION INTRAVENOUS CONTINUOUS
Status: DISCONTINUED | OUTPATIENT
Start: 2024-11-11 | End: 2024-11-13 | Stop reason: HOSPADM

## 2024-11-11 RX ADMIN — MULTIVITAMIN TABLET 1 TABLET: TABLET at 05:15

## 2024-11-11 RX ADMIN — PANTOPRAZOLE SODIUM 40 MG: 40 INJECTION, POWDER, FOR SOLUTION INTRAVENOUS at 05:15

## 2024-11-11 RX ADMIN — OXYCODONE 5 MG: 5 TABLET ORAL at 18:21

## 2024-11-11 RX ADMIN — SENNOSIDES AND DOCUSATE SODIUM 2 TABLET: 50; 8.6 TABLET ORAL at 16:34

## 2024-11-11 RX ADMIN — OXYCODONE HYDROCHLORIDE 10 MG: 10 TABLET ORAL at 19:35

## 2024-11-11 RX ADMIN — OXYCODONE HYDROCHLORIDE 10 MG: 10 TABLET ORAL at 01:05

## 2024-11-11 RX ADMIN — LORAZEPAM 0.5 MG: 2 INJECTION INTRAMUSCULAR; INTRAVENOUS at 10:59

## 2024-11-11 RX ADMIN — OXYCODONE HYDROCHLORIDE 10 MG: 10 TABLET ORAL at 22:42

## 2024-11-11 RX ADMIN — OXYCODONE HYDROCHLORIDE 10 MG: 10 TABLET ORAL at 14:22

## 2024-11-11 RX ADMIN — SODIUM CHLORIDE, POTASSIUM CHLORIDE, SODIUM LACTATE AND CALCIUM CHLORIDE: 600; 310; 30; 20 INJECTION, SOLUTION INTRAVENOUS at 15:38

## 2024-11-11 RX ADMIN — GADOTERIDOL 11 ML: 279.3 INJECTION, SOLUTION INTRAVENOUS at 12:25

## 2024-11-11 RX ADMIN — OXYCODONE HYDROCHLORIDE 10 MG: 10 TABLET ORAL at 04:22

## 2024-11-11 RX ADMIN — FOLIC ACID 1 MG: 1 TABLET ORAL at 05:15

## 2024-11-11 RX ADMIN — Medication 100 MG: at 05:15

## 2024-11-11 RX ADMIN — PANTOPRAZOLE SODIUM 40 MG: 40 INJECTION, POWDER, FOR SOLUTION INTRAVENOUS at 16:34

## 2024-11-11 ASSESSMENT — PATIENT HEALTH QUESTIONNAIRE - PHQ9
2. FEELING DOWN, DEPRESSED, IRRITABLE, OR HOPELESS: NOT AT ALL
1. LITTLE INTEREST OR PLEASURE IN DOING THINGS: NOT AT ALL
SUM OF ALL RESPONSES TO PHQ9 QUESTIONS 1 AND 2: 0

## 2024-11-11 ASSESSMENT — LIFESTYLE VARIABLES
VISUAL DISTURBANCES: NOT PRESENT
ANXIETY: *
TREMOR: NO TREMOR
AUDITORY DISTURBANCES: NOT PRESENT
HEADACHE, FULLNESS IN HEAD: NOT PRESENT
AGITATION: NORMAL ACTIVITY
TREMOR: NO TREMOR
TREMOR: NO TREMOR
AUDITORY DISTURBANCES: NOT PRESENT
ANXIETY: MILDLY ANXIOUS
NAUSEA AND VOMITING: NO NAUSEA AND NO VOMITING
ANXIETY: NO ANXIETY (AT EASE)
VISUAL DISTURBANCES: NOT PRESENT
PAROXYSMAL SWEATS: NO SWEAT VISIBLE
ORIENTATION AND CLOUDING OF SENSORIUM: ORIENTED AND CAN DO SERIAL ADDITIONS
TREMOR: NO TREMOR
NAUSEA AND VOMITING: NO NAUSEA AND NO VOMITING
ORIENTATION AND CLOUDING OF SENSORIUM: ORIENTED AND CAN DO SERIAL ADDITIONS
VISUAL DISTURBANCES: NOT PRESENT
NAUSEA AND VOMITING: NO NAUSEA AND NO VOMITING
ORIENTATION AND CLOUDING OF SENSORIUM: ORIENTED AND CAN DO SERIAL ADDITIONS
AGITATION: NORMAL ACTIVITY
VISUAL DISTURBANCES: NOT PRESENT
AGITATION: NORMAL ACTIVITY
HEADACHE, FULLNESS IN HEAD: NOT PRESENT
HEADACHE, FULLNESS IN HEAD: NOT PRESENT
PAROXYSMAL SWEATS: NO SWEAT VISIBLE
AGITATION: NORMAL ACTIVITY
TOTAL SCORE: 0
ORIENTATION AND CLOUDING OF SENSORIUM: ORIENTED AND CAN DO SERIAL ADDITIONS
AUDITORY DISTURBANCES: NOT PRESENT
HEADACHE, FULLNESS IN HEAD: NOT PRESENT
PAROXYSMAL SWEATS: NO SWEAT VISIBLE
ANXIETY: NO ANXIETY (AT EASE)
TOTAL SCORE: 2
NAUSEA AND VOMITING: NO NAUSEA AND NO VOMITING
TOTAL SCORE: 0
TREMOR: NO TREMOR
HEADACHE, FULLNESS IN HEAD: NOT PRESENT
PAROXYSMAL SWEATS: NO SWEAT VISIBLE
NAUSEA AND VOMITING: NO NAUSEA AND NO VOMITING
AGITATION: NORMAL ACTIVITY
AUDITORY DISTURBANCES: NOT PRESENT
VISUAL DISTURBANCES: NOT PRESENT
ANXIETY: NO ANXIETY (AT EASE)
TOTAL SCORE: 0
ORIENTATION AND CLOUDING OF SENSORIUM: ORIENTED AND CAN DO SERIAL ADDITIONS
TOTAL SCORE: 1
SUBSTANCE_ABUSE: 1
AUDITORY DISTURBANCES: NOT PRESENT
PAROXYSMAL SWEATS: NO SWEAT VISIBLE

## 2024-11-11 ASSESSMENT — PAIN DESCRIPTION - PAIN TYPE
TYPE: ACUTE PAIN

## 2024-11-11 ASSESSMENT — FIBROSIS 4 INDEX
FIB4 SCORE: 6.82
FIB4 SCORE: 5.05

## 2024-11-11 ASSESSMENT — ENCOUNTER SYMPTOMS
HEARTBURN: 0
DIZZINESS: 0
FEVER: 0
SHORTNESS OF BREATH: 0
BACK PAIN: 0
FALLS: 0
ABDOMINAL PAIN: 1
COUGH: 0
DOUBLE VISION: 0
NAUSEA: 1
HEADACHES: 0
BLURRED VISION: 0
PALPITATIONS: 0
CHILLS: 0

## 2024-11-11 NOTE — PROGRESS NOTES
"Hospital Medicine Daily Progress Note    Date of Service  11/11/2024    Chief Complaint  Brenda Cartagena is a 39 y.o. female admitted 11/10/2024 with abdominal pain.    Hospital Course  As per chart review:  \"39 y.o. female with a past medical history of alcohol dependence who presented as a direct admission on 11/10/2024 with abnormal CT imaging concerning for possible pancreatic mass, versus pseudocyst.  Apparently the patient has multiple hospitalizations/emergency visits for alcohol withdrawal with and frequently leaves AGAINST MEDICAL ADVICE.  The patient has been having worsening abdominal pain.  The pain is in the epigastric region.  She denies having fevers or chills.\"    Interval Problem Update  11/11: Patient seen at bedside this morning.  Patient still complaining of abdominal pain.  Pending MRI.  Depending on MRI the patient might require GI consult.  The patient states that she has not seen a GI physician in [place], however it seems that Dr. Dawson has seen the patient in February 2014.  If GI needed will consult [facility].  -- MRI report has come out which is concerning for pancreatic ductal dilation with stones and debris.  We have consulted GI, I have discussed case with Dr. Gardner.  We appreciate further recommendations.  Patient currently n.p.o. just in case GI would like to perform a procedure.  -- I discussed again with GI, they are thinking the patient might require outpatient follow-up.  For now they would like conservative management with pain management.  Will start the patient on clear liquid diet for now and see if she can tolerate p.o.  --GI reached out again, and are contemplating EGD tomorrow. We will place patient NPO at midnight.    I have discussed this patient's plan of care and discharge plan at IDT rounds today with Case Management, Nursing, Nursing leadership, and other members of the IDT team.    Consultants/Specialty  None for now    Code Status  Full Code    Disposition  The " patient is not medically cleared for discharge to home or a post-acute facility.        Review of Systems  Review of Systems   Constitutional:  Positive for malaise/fatigue. Negative for chills and fever.   HENT:  Negative for hearing loss and nosebleeds.    Eyes:  Negative for blurred vision and double vision.   Respiratory:  Negative for cough and shortness of breath.    Cardiovascular:  Negative for chest pain and palpitations.   Gastrointestinal:  Positive for abdominal pain and nausea. Negative for heartburn.   Genitourinary:  Negative for dysuria and urgency.   Musculoskeletal:  Negative for back pain and falls.   Skin:  Negative for itching and rash.   Neurological:  Negative for dizziness and headaches.   Psychiatric/Behavioral:  Positive for substance abuse.    All other systems reviewed and are negative.       Physical Exam  Temp:  [36.1 °C (97 °F)-36.8 °C (98.2 °F)] 36.1 °C (97 °F)  Pulse:  [] 80  Resp:  [16-18] 18  BP: ()/(58-71) 103/68  SpO2:  [90 %-96 %] 90 %    Physical Exam  Vitals and nursing note reviewed.   Constitutional:       General: She is not in acute distress.     Appearance: She is ill-appearing.   HENT:      Head: Normocephalic and atraumatic.      Right Ear: External ear normal.      Left Ear: External ear normal.      Mouth/Throat:      Pharynx: No oropharyngeal exudate or posterior oropharyngeal erythema.   Eyes:      General:         Right eye: No discharge.         Left eye: No discharge.   Cardiovascular:      Rate and Rhythm: Normal rate and regular rhythm.      Pulses: Normal pulses.      Heart sounds: No murmur heard.     No gallop.   Pulmonary:      Effort: Pulmonary effort is normal. No respiratory distress.      Breath sounds: Normal breath sounds. No wheezing or rhonchi.   Abdominal:      Tenderness: There is abdominal tenderness.   Musculoskeletal:         General: No swelling or tenderness. Normal range of motion.      Cervical back: Normal range of motion and  neck supple. No tenderness.   Skin:     General: Skin is warm and dry.   Neurological:      General: No focal deficit present.      Mental Status: She is alert and oriented to person, place, and time. Mental status is at baseline.      Motor: No weakness.   Psychiatric:         Mood and Affect: Mood normal.         Behavior: Behavior normal.         Fluids    Intake/Output Summary (Last 24 hours) at 11/11/2024 1210  Last data filed at 11/11/2024 1036  Gross per 24 hour   Intake 440 ml   Output --   Net 440 ml        Laboratory  Recent Labs     11/10/24  1535 11/11/24  0145   WBC 4.5* 3.4*   RBC 3.10* 2.99*   HEMOGLOBIN 9.6* 9.4*   HEMATOCRIT 29.1* 29.2*   MCV 93.9 97.7   MCH 31.0 31.4   MCHC 33.0 32.2   RDW 55.0* 57.1*   PLATELETCT 88* 85*   MPV 11.3 12.4     Recent Labs     11/10/24  1535 11/11/24  0145   SODIUM 134* 131*   POTASSIUM 3.7 4.2   CHLORIDE 100 99   CO2 20 18*   GLUCOSE 132* 136*   BUN 9 9   CREATININE 0.32* 0.31*   CALCIUM 8.1* 8.1*                   Imaging  US-OUTSIDE IMAGES-US ABDOMEN   Final Result      CT-OUTSIDE IMAGES-CT ABDOMEN/PELVIS   Final Result      US-OUTSIDE IMAGES-US ABDOMEN   Final Result      CT-OUTSIDE IMAGES-CT ABDOMEN/PELVIS    (Results Pending)   MR-ABDOMEN-WITH & W/O    (Results Pending)        Assessment/Plan  * Intractable abdominal pain- (present on admission)  Assessment & Plan  Likely multifactorial secondary to alcoholic gastritis, chronic pancreatitis and possible pancreatic mass  I will start intravenous PPI.  Multimodal pain control  CT imaging at the transferring facility are concerning for possible pancreatic mass versus pseudocyst.      11/11: Pending MRI. Monitor closely    Hyperglycemia  Assessment & Plan  Could be reactive  Monitor  Pending A1c    CT imaging are concerning for possible pancreatic mass versus pseudocyst- (present on admission)  Assessment & Plan  CT imaging at the transferring facility are concerning for possible pancreatic mass versus  pseudocyst.  I will check MRCP for further evaluation.      11/11: Pending MRI    Leukopenia- (present on admission)  Assessment & Plan  Appears to be chronic.  Likely secondary to alcohol dependence.     Anemia- (present on admission)  Assessment & Plan  Appears to be chronic  No evidence of gross bleeding.  Monitor hemoglobin. I will order a follow-up CBC.  Transfuse for a hemoglobin of less than or equal to 7.     Hyponatremia- (present on admission)  Assessment & Plan  Mild  Monitor  In the setting of alcohol abuse?    Alcohol dependence (HCC)- (present on admission)  Assessment & Plan  I will start on CIWA protocol   I will place on continuous cardiac monitoring  I will check K, Mg, Na, Ca. Monitor electrolytes and replace accordingly, particularly magnesium, potassium and phosphorus  I will order fall, seizure, & aspiration precautions.  I will order thiamine folic acid and multivitamin.  Counseling      Thrombocytopenia (HCC)- (present on admission)  Assessment & Plan  Appears to be chronic.  Likely secondary to alcohol dependence  No evidence of gross bleeding at this point.  Continue to monitor platelet count.  I will order a follow-up CBC.           VTE prophylaxis: SCDs    I have performed a physical exam and reviewed and updated ROS and Plan today (11/11/2024). In review of yesterday's note (11/10/2024), there are no changes except as documented above.      I spend at least 51 minutes providing care for this patient.  This included face-to-face interview, physical examination.  Review of lab work including CBC, CMP, phosphorus, magnesium.  Discussing with multidisciplinary team including case management, nursing staff and pharmacy.  Creating plan of care, reviewing orders.

## 2024-11-11 NOTE — PROGRESS NOTES
4 Eyes Skin Assessment Completed by CARLINE Mederos and CARLINE Torres.    Head WDL  Ears WDL  Nose WDL  Mouth WDL  Neck WDL  Breast/Chest WDL  Shoulder Blades WDL  Spine WDL  (R) Arm/Elbow/Hand Bruising from lab draw  (L) Arm/Elbow/Hand Bruising from lab draw  Abdomen Bruising Left lower quad  Groin WDL  Scrotum/Coccyx/Buttocks WDL  (R) Leg Scar   (L) Leg WDL  (R) Heel/Foot/Toe WDL  (L) Heel/Foot/Toe WDL      Devices In Places Blood Pressure Cuff and Pulse Ox      Interventions In Place Pillows    Possible Skin Injury No    Pictures Uploaded Into Epic N/A  Wound Consult Placed N/A  RN Wound Prevention Protocol Ordered No

## 2024-11-11 NOTE — ASSESSMENT & PLAN NOTE
"CT imaging at the transferring facility are concerning for possible pancreatic mass versus pseudocyst.  I will check MRCP for further evaluation.      11/11: Pending MRI    11/12: MRI reported: \"Pancreatic ductal dilation with stones and debris present within the pancreatic duct in the body and head. Cystic lesion with internal debris and/or stones in the distal pancreatic body measuring 15 mm with associated contour deformity of the pancreas.  IPMN favored although exact etiology is uncertain.\"  -- I have consulted GI, and they will follow-up with the patient as an outpatient.  The patient will most likely require endoscopic ultrasound, however as per GI as an outpatient.  "

## 2024-11-11 NOTE — CARE PLAN
The patient is Stable - Low risk of patient condition declining or worsening    Shift Goals  Clinical Goals: Pain to be controlled at 4/10 or less post intervention, Monitor CIWA, no falls oveernight  Patient Goals: Sleep/rest overnight    Progress made toward(s) clinical / shift goals:  Patient has scored 0 on the CIWA scale and has had no falls overnight    Patient is not progressing towards the following goals: Patient has required frequent pain medication overnight      Problem: Pain - Standard  Goal: Alleviation of pain or a reduction in pain to the patient’s comfort goal  11/11/2024 0451 by Blane Victoria, R.N.  Outcome: Not Progressing  11/11/2024 0450 by Blane Victoria, R.N.  Outcome: Not Progressing

## 2024-11-11 NOTE — ASSESSMENT & PLAN NOTE
I will start on CIWA protocol   I will place on continuous cardiac monitoring  I will check K, Mg, Na, Ca. Monitor electrolytes and replace accordingly, particularly magnesium, potassium and phosphorus  I will order fall, seizure, & aspiration precautions.  I will order thiamine folic acid and multivitamin.      11/12: CIWA scores have remained low.  If they continue to remain low we will consider discontinuing CIWA protocol within the next 24 hours.

## 2024-11-11 NOTE — CARE PLAN
The patient is Stable - Low risk of patient condition declining or worsening    Shift Goals  Clinical Goals: remain fall free, pain controlled to less than 4, CIWA  Patient Goals: Sleep and pain control    Progress made toward(s) clinical / shift goals:  Patient has scored zero on CIWA. Pain reported once during shift. Tolerating diet.     Patient is not progressing towards the following goals:

## 2024-11-11 NOTE — ASSESSMENT & PLAN NOTE
There was concern for upper GI bleeding  PPI IV    11/12: Patient to undergo EGD today, we appreciate further recommendations by GI.

## 2024-11-11 NOTE — ASSESSMENT & PLAN NOTE
Appears to be chronic.  Likely secondary to alcohol dependence  No evidence of gross bleeding at this point.  Continue to monitor platelet count.  I will order a follow-up CBC.

## 2024-11-11 NOTE — ASSESSMENT & PLAN NOTE
"Likely multifactorial secondary to alcoholic gastritis, chronic pancreatitis and possible pancreatic mass  I will start intravenous PPI.  Multimodal pain control  CT imaging at the transferring facility are concerning for possible pancreatic mass versus pseudocyst.      11/12: MRI reported: \"Pancreatic ductal dilation with stones and debris present within the pancreatic duct in the body and head. Cystic lesion with internal debris and/or stones in the distal pancreatic body measuring 15 mm with associated contour deformity of the pancreas.  IPMN favored although exact etiology is uncertain.\"  -- I have consulted GI, and they will follow-up with the patient as an outpatient.  The patient will most likely require endoscopic ultrasound, however as per GI as an outpatient.    -- Pain management. Also there is concern for upper GI bleeding.  PPI IV. Patient to undergo EGD today by GI.  We appreciate further recommendations.  Monitor hemoglobin.  "

## 2024-11-12 ENCOUNTER — ANESTHESIA (OUTPATIENT)
Dept: SURGERY | Facility: MEDICAL CENTER | Age: 39
DRG: 377 | End: 2024-11-12
Payer: MEDICAID

## 2024-11-12 PROBLEM — E83.42 HYPOMAGNESEMIA: Status: ACTIVE | Noted: 2024-11-12

## 2024-11-12 LAB
ALBUMIN SERPL BCP-MCNC: 3.2 G/DL (ref 3.2–4.9)
ALBUMIN/GLOB SERPL: 1.1 G/DL
ALP SERPL-CCNC: 74 U/L (ref 30–99)
ALT SERPL-CCNC: 70 U/L (ref 2–50)
ANION GAP SERPL CALC-SCNC: 14 MMOL/L (ref 7–16)
AST SERPL-CCNC: 120 U/L (ref 12–45)
BILIRUB SERPL-MCNC: 0.4 MG/DL (ref 0.1–1.5)
BUN SERPL-MCNC: 3 MG/DL (ref 8–22)
CALCIUM ALBUM COR SERPL-MCNC: 8.7 MG/DL (ref 8.5–10.5)
CALCIUM SERPL-MCNC: 8.1 MG/DL (ref 8.4–10.2)
CHLORIDE SERPL-SCNC: 103 MMOL/L (ref 96–112)
CO2 SERPL-SCNC: 20 MMOL/L (ref 20–33)
CREAT SERPL-MCNC: 0.3 MG/DL (ref 0.5–1.4)
ERYTHROCYTE [DISTWIDTH] IN BLOOD BY AUTOMATED COUNT: 55.7 FL (ref 35.9–50)
GFR SERPLBLD CREATININE-BSD FMLA CKD-EPI: 136 ML/MIN/1.73 M 2
GLOBULIN SER CALC-MCNC: 2.9 G/DL (ref 1.9–3.5)
GLUCOSE SERPL-MCNC: 91 MG/DL (ref 65–99)
HCG UR QL: NEGATIVE
HCT VFR BLD AUTO: 27.6 % (ref 37–47)
HCT VFR BLD AUTO: 31.4 % (ref 37–47)
HCT VFR BLD AUTO: 34.8 % (ref 37–47)
HGB BLD-MCNC: 10.1 G/DL (ref 12–16)
HGB BLD-MCNC: 11.2 G/DL (ref 12–16)
HGB BLD-MCNC: 9 G/DL (ref 12–16)
MAGNESIUM SERPL-MCNC: 1.7 MG/DL (ref 1.5–2.5)
MCH RBC QN AUTO: 31.3 PG (ref 27–33)
MCHC RBC AUTO-ENTMCNC: 32.6 G/DL (ref 32.2–35.5)
MCV RBC AUTO: 95.8 FL (ref 81.4–97.8)
PATHOLOGY CONSULT NOTE: NORMAL
PHOSPHATE SERPL-MCNC: 3 MG/DL (ref 2.5–4.5)
PLATELET # BLD AUTO: 122 K/UL (ref 164–446)
PMV BLD AUTO: 11.4 FL (ref 9–12.9)
POTASSIUM SERPL-SCNC: 4 MMOL/L (ref 3.6–5.5)
PROT SERPL-MCNC: 6.1 G/DL (ref 6–8.2)
RBC # BLD AUTO: 2.88 M/UL (ref 4.2–5.4)
SODIUM SERPL-SCNC: 137 MMOL/L (ref 135–145)
WBC # BLD AUTO: 2.6 K/UL (ref 4.8–10.8)

## 2024-11-12 PROCEDURE — 700102 HCHG RX REV CODE 250 W/ 637 OVERRIDE(OP): Performed by: HOSPITALIST

## 2024-11-12 PROCEDURE — 160009 HCHG ANES TIME/MIN: Performed by: INTERNAL MEDICINE

## 2024-11-12 PROCEDURE — 88305 TISSUE EXAM BY PATHOLOGIST: CPT

## 2024-11-12 PROCEDURE — 85014 HEMATOCRIT: CPT

## 2024-11-12 PROCEDURE — 770001 HCHG ROOM/CARE - MED/SURG/GYN PRIV*

## 2024-11-12 PROCEDURE — 99233 SBSQ HOSP IP/OBS HIGH 50: CPT | Performed by: INTERNAL MEDICINE

## 2024-11-12 PROCEDURE — 80053 COMPREHEN METABOLIC PANEL: CPT

## 2024-11-12 PROCEDURE — 85027 COMPLETE CBC AUTOMATED: CPT

## 2024-11-12 PROCEDURE — 700111 HCHG RX REV CODE 636 W/ 250 OVERRIDE (IP): Mod: JZ | Performed by: HOSPITALIST

## 2024-11-12 PROCEDURE — 700102 HCHG RX REV CODE 250 W/ 637 OVERRIDE(OP): Performed by: INTERNAL MEDICINE

## 2024-11-12 PROCEDURE — 94760 N-INVAS EAR/PLS OXIMETRY 1: CPT

## 2024-11-12 PROCEDURE — 700101 HCHG RX REV CODE 250: Performed by: STUDENT IN AN ORGANIZED HEALTH CARE EDUCATION/TRAINING PROGRAM

## 2024-11-12 PROCEDURE — 85018 HEMOGLOBIN: CPT

## 2024-11-12 PROCEDURE — 700105 HCHG RX REV CODE 258: Performed by: INTERNAL MEDICINE

## 2024-11-12 PROCEDURE — 160035 HCHG PACU - 1ST 60 MINS PHASE I: Performed by: INTERNAL MEDICINE

## 2024-11-12 PROCEDURE — 700111 HCHG RX REV CODE 636 W/ 250 OVERRIDE (IP): Performed by: STUDENT IN AN ORGANIZED HEALTH CARE EDUCATION/TRAINING PROGRAM

## 2024-11-12 PROCEDURE — 160202 HCHG ENDO MINUTES - 1ST 30 MINS LEVEL 3: Performed by: INTERNAL MEDICINE

## 2024-11-12 PROCEDURE — 700111 HCHG RX REV CODE 636 W/ 250 OVERRIDE (IP): Performed by: INTERNAL MEDICINE

## 2024-11-12 PROCEDURE — A9270 NON-COVERED ITEM OR SERVICE: HCPCS | Performed by: HOSPITALIST

## 2024-11-12 PROCEDURE — 160048 HCHG OR STATISTICAL LEVEL 1-5: Performed by: INTERNAL MEDICINE

## 2024-11-12 PROCEDURE — 88312 SPECIAL STAINS GROUP 1: CPT

## 2024-11-12 PROCEDURE — 160002 HCHG RECOVERY MINUTES (STAT): Performed by: INTERNAL MEDICINE

## 2024-11-12 PROCEDURE — 84100 ASSAY OF PHOSPHORUS: CPT

## 2024-11-12 PROCEDURE — 83735 ASSAY OF MAGNESIUM: CPT

## 2024-11-12 PROCEDURE — A9270 NON-COVERED ITEM OR SERVICE: HCPCS | Performed by: INTERNAL MEDICINE

## 2024-11-12 PROCEDURE — 81025 URINE PREGNANCY TEST: CPT

## 2024-11-12 PROCEDURE — 0DJ08ZZ INSPECTION OF UPPER INTESTINAL TRACT, VIA NATURAL OR ARTIFICIAL OPENING ENDOSCOPIC: ICD-10-PCS | Performed by: INTERNAL MEDICINE

## 2024-11-12 PROCEDURE — 36415 COLL VENOUS BLD VENIPUNCTURE: CPT

## 2024-11-12 RX ORDER — HYDROMORPHONE HYDROCHLORIDE 1 MG/ML
0.5 INJECTION, SOLUTION INTRAMUSCULAR; INTRAVENOUS; SUBCUTANEOUS EVERY 6 HOURS PRN
Status: DISCONTINUED | OUTPATIENT
Start: 2024-11-12 | End: 2024-11-13 | Stop reason: HOSPADM

## 2024-11-12 RX ORDER — OXYCODONE HCL 5 MG/5 ML
10 SOLUTION, ORAL ORAL
Status: DISCONTINUED | OUTPATIENT
Start: 2024-11-12 | End: 2024-11-12 | Stop reason: HOSPADM

## 2024-11-12 RX ORDER — LIDOCAINE HYDROCHLORIDE 20 MG/ML
INJECTION, SOLUTION EPIDURAL; INFILTRATION; INTRACAUDAL; PERINEURAL PRN
Status: DISCONTINUED | OUTPATIENT
Start: 2024-11-12 | End: 2024-11-12 | Stop reason: SURG

## 2024-11-12 RX ORDER — METOPROLOL TARTRATE 1 MG/ML
1 INJECTION, SOLUTION INTRAVENOUS
Status: DISCONTINUED | OUTPATIENT
Start: 2024-11-12 | End: 2024-11-12 | Stop reason: HOSPADM

## 2024-11-12 RX ORDER — OXYCODONE HYDROCHLORIDE 10 MG/1
10 TABLET ORAL EVERY 6 HOURS PRN
Status: DISCONTINUED | OUTPATIENT
Start: 2024-11-12 | End: 2024-11-13 | Stop reason: HOSPADM

## 2024-11-12 RX ORDER — SODIUM CHLORIDE, SODIUM LACTATE, POTASSIUM CHLORIDE, CALCIUM CHLORIDE 600; 310; 30; 20 MG/100ML; MG/100ML; MG/100ML; MG/100ML
INJECTION, SOLUTION INTRAVENOUS CONTINUOUS
Status: ACTIVE | OUTPATIENT
Start: 2024-11-12 | End: 2024-11-12

## 2024-11-12 RX ORDER — OXYCODONE HYDROCHLORIDE 5 MG/1
5 TABLET ORAL EVERY 6 HOURS PRN
Status: DISCONTINUED | OUTPATIENT
Start: 2024-11-12 | End: 2024-11-13 | Stop reason: HOSPADM

## 2024-11-12 RX ORDER — EPHEDRINE SULFATE 50 MG/ML
5 INJECTION, SOLUTION INTRAVENOUS
Status: DISCONTINUED | OUTPATIENT
Start: 2024-11-12 | End: 2024-11-12 | Stop reason: HOSPADM

## 2024-11-12 RX ORDER — HYDRALAZINE HYDROCHLORIDE 20 MG/ML
5 INJECTION INTRAMUSCULAR; INTRAVENOUS
Status: DISCONTINUED | OUTPATIENT
Start: 2024-11-12 | End: 2024-11-12 | Stop reason: HOSPADM

## 2024-11-12 RX ORDER — MAGNESIUM SULFATE HEPTAHYDRATE 40 MG/ML
2 INJECTION, SOLUTION INTRAVENOUS ONCE
Status: COMPLETED | OUTPATIENT
Start: 2024-11-12 | End: 2024-11-12

## 2024-11-12 RX ORDER — ONDANSETRON 2 MG/ML
4 INJECTION INTRAMUSCULAR; INTRAVENOUS
Status: DISCONTINUED | OUTPATIENT
Start: 2024-11-12 | End: 2024-11-12 | Stop reason: HOSPADM

## 2024-11-12 RX ORDER — LABETALOL HYDROCHLORIDE 5 MG/ML
5 INJECTION, SOLUTION INTRAVENOUS
Status: DISCONTINUED | OUTPATIENT
Start: 2024-11-12 | End: 2024-11-12 | Stop reason: HOSPADM

## 2024-11-12 RX ORDER — DIPHENHYDRAMINE HYDROCHLORIDE 50 MG/ML
12.5 INJECTION INTRAMUSCULAR; INTRAVENOUS
Status: DISCONTINUED | OUTPATIENT
Start: 2024-11-12 | End: 2024-11-12 | Stop reason: HOSPADM

## 2024-11-12 RX ORDER — OXYCODONE HCL 5 MG/5 ML
5 SOLUTION, ORAL ORAL
Status: DISCONTINUED | OUTPATIENT
Start: 2024-11-12 | End: 2024-11-12 | Stop reason: HOSPADM

## 2024-11-12 RX ORDER — ALBUTEROL SULFATE 5 MG/ML
2.5 SOLUTION RESPIRATORY (INHALATION)
Status: DISCONTINUED | OUTPATIENT
Start: 2024-11-12 | End: 2024-11-12 | Stop reason: HOSPADM

## 2024-11-12 RX ADMIN — Medication 100 MG: at 05:33

## 2024-11-12 RX ADMIN — OXYCODONE 5 MG: 5 TABLET ORAL at 18:53

## 2024-11-12 RX ADMIN — ONDANSETRON 4 MG: 2 INJECTION INTRAMUSCULAR; INTRAVENOUS at 12:54

## 2024-11-12 RX ADMIN — ACETAMINOPHEN 650 MG: 325 TABLET ORAL at 20:47

## 2024-11-12 RX ADMIN — SODIUM CHLORIDE, POTASSIUM CHLORIDE, SODIUM LACTATE AND CALCIUM CHLORIDE: 600; 310; 30; 20 INJECTION, SOLUTION INTRAVENOUS at 20:07

## 2024-11-12 RX ADMIN — PROPOFOL 100 MG: 10 INJECTION, EMULSION INTRAVENOUS at 09:24

## 2024-11-12 RX ADMIN — LORAZEPAM 1 MG: 1 TABLET ORAL at 20:48

## 2024-11-12 RX ADMIN — PANTOPRAZOLE SODIUM 40 MG: 40 INJECTION, POWDER, FOR SOLUTION INTRAVENOUS at 17:36

## 2024-11-12 RX ADMIN — SENNOSIDES AND DOCUSATE SODIUM 2 TABLET: 50; 8.6 TABLET ORAL at 17:38

## 2024-11-12 RX ADMIN — MAGNESIUM SULFATE HEPTAHYDRATE 2 G: 2 INJECTION, SOLUTION INTRAVENOUS at 05:39

## 2024-11-12 RX ADMIN — PANTOPRAZOLE SODIUM 40 MG: 40 INJECTION, POWDER, FOR SOLUTION INTRAVENOUS at 05:43

## 2024-11-12 RX ADMIN — MULTIVITAMIN TABLET 1 TABLET: TABLET at 05:33

## 2024-11-12 RX ADMIN — OXYCODONE HYDROCHLORIDE 10 MG: 10 TABLET ORAL at 02:09

## 2024-11-12 RX ADMIN — LIDOCAINE HYDROCHLORIDE 60 MG: 20 INJECTION, SOLUTION EPIDURAL; INFILTRATION; INTRACAUDAL; PERINEURAL at 09:24

## 2024-11-12 RX ADMIN — FOLIC ACID 1 MG: 1 TABLET ORAL at 05:33

## 2024-11-12 RX ADMIN — SODIUM CHLORIDE, POTASSIUM CHLORIDE, SODIUM LACTATE AND CALCIUM CHLORIDE: 600; 310; 30; 20 INJECTION, SOLUTION INTRAVENOUS at 09:21

## 2024-11-12 RX ADMIN — OXYCODONE HYDROCHLORIDE 10 MG: 10 TABLET ORAL at 05:33

## 2024-11-12 RX ADMIN — PROPOFOL 100 MCG/KG/MIN: 10 INJECTION, EMULSION INTRAVENOUS at 09:25

## 2024-11-12 RX ADMIN — OXYCODONE 5 MG: 5 TABLET ORAL at 15:22

## 2024-11-12 RX ADMIN — SODIUM CHLORIDE, POTASSIUM CHLORIDE, SODIUM LACTATE AND CALCIUM CHLORIDE: 600; 310; 30; 20 INJECTION, SOLUTION INTRAVENOUS at 05:32

## 2024-11-12 ASSESSMENT — COGNITIVE AND FUNCTIONAL STATUS - GENERAL
DAILY ACTIVITIY SCORE: 24
SUGGESTED CMS G CODE MODIFIER MOBILITY: CH
MOBILITY SCORE: 24
SUGGESTED CMS G CODE MODIFIER DAILY ACTIVITY: CH

## 2024-11-12 ASSESSMENT — LIFESTYLE VARIABLES
VISUAL DISTURBANCES: NOT PRESENT
NAUSEA AND VOMITING: MILD NAUSEA WITH NO VOMITING
HEADACHE, FULLNESS IN HEAD: NOT PRESENT
HEADACHE, FULLNESS IN HEAD: NOT PRESENT
AUDITORY DISTURBANCES: NOT PRESENT
PAROXYSMAL SWEATS: NO SWEAT VISIBLE
HEADACHE, FULLNESS IN HEAD: NOT PRESENT
TOTAL SCORE: 2
TREMOR: NO TREMOR
AGITATION: NORMAL ACTIVITY
TOTAL SCORE: 1
AGITATION: NORMAL ACTIVITY
ANXIETY: NO ANXIETY (AT EASE)
AGITATION: NORMAL ACTIVITY
TREMOR: NO TREMOR
NAUSEA AND VOMITING: MILD NAUSEA WITH NO VOMITING
HEADACHE, FULLNESS IN HEAD: MILD
AUDITORY DISTURBANCES: NOT PRESENT
AUDITORY DISTURBANCES: NOT PRESENT
AGITATION: NORMAL ACTIVITY
ANXIETY: NO ANXIETY (AT EASE)
ORIENTATION AND CLOUDING OF SENSORIUM: ORIENTED AND CAN DO SERIAL ADDITIONS
VISUAL DISTURBANCES: NOT PRESENT
AGITATION: NORMAL ACTIVITY
TOTAL SCORE: 1
NAUSEA AND VOMITING: MILD NAUSEA WITH NO VOMITING
HEADACHE, FULLNESS IN HEAD: NOT PRESENT
VISUAL DISTURBANCES: NOT PRESENT
PAROXYSMAL SWEATS: NO SWEAT VISIBLE
TOTAL SCORE: 1
PAROXYSMAL SWEATS: NO SWEAT VISIBLE
TOTAL SCORE: 8
VISUAL DISTURBANCES: NOT PRESENT
NAUSEA AND VOMITING: MILD NAUSEA WITH NO VOMITING
AUDITORY DISTURBANCES: NOT PRESENT
ANXIETY: MODERATELY ANXIOUS OR GUARDED, SO ANXIETY IS INFERRED
TREMOR: NO TREMOR
TREMOR: NO TREMOR
AUDITORY DISTURBANCES: NOT PRESENT
SUBSTANCE_ABUSE: 1
ORIENTATION AND CLOUDING OF SENSORIUM: ORIENTED AND CAN DO SERIAL ADDITIONS
PAROXYSMAL SWEATS: BARELY PERCEPTIBLE SWEATING. PALMS MOIST
NAUSEA AND VOMITING: MILD NAUSEA WITH NO VOMITING
PAROXYSMAL SWEATS: NO SWEAT VISIBLE
ANXIETY: MILDLY ANXIOUS
TREMOR: NO TREMOR
VISUAL DISTURBANCES: NOT PRESENT
ORIENTATION AND CLOUDING OF SENSORIUM: ORIENTED AND CAN DO SERIAL ADDITIONS
ANXIETY: NO ANXIETY (AT EASE)

## 2024-11-12 ASSESSMENT — ENCOUNTER SYMPTOMS
ABDOMINAL PAIN: 1
BLURRED VISION: 0
FEVER: 0
CHILLS: 0
SHORTNESS OF BREATH: 0
COUGH: 0
NAUSEA: 1
HEARTBURN: 0
FALLS: 0
DIZZINESS: 0
HEADACHES: 0
BACK PAIN: 0
PALPITATIONS: 0
DOUBLE VISION: 0

## 2024-11-12 ASSESSMENT — PAIN DESCRIPTION - PAIN TYPE
TYPE: ACUTE PAIN
TYPE: CHRONIC PAIN
TYPE: ACUTE PAIN
TYPE: CHRONIC PAIN
TYPE: ACUTE PAIN
TYPE: ACUTE PAIN
TYPE: CHRONIC PAIN

## 2024-11-12 ASSESSMENT — FIBROSIS 4 INDEX: FIB4 SCORE: 4.82

## 2024-11-12 NOTE — ANESTHESIA PREPROCEDURE EVALUATION
Case: [id number] Date/Time: 11/12/24 0915    Procedure: GASTROSCOPY    Location:  ENDOSCOPIC ULTRASOUND ROOM / SURGERY [facility]    Surgeons: Umesh Gardner D.O.            Relevant Problems   ANESTHESIA (within normal limits)      PULMONARY (within normal limits)      NEURO (within normal limits)      CARDIAC (within normal limits)      GI (within normal limits)       (within normal limits)      ENDO (within normal limits)       Physical Exam    Airway   Mallampati: II  TM distance: >3 FB  Neck ROM: full       Cardiovascular - normal exam  Rhythm: regular  Rate: normal  (-) murmur     Dental - normal exam           Pulmonary - normal exam  Breath sounds clear to auscultation     Abdominal    Neurological - normal exam                   Anesthesia Plan    ASA 2       Plan - MAC               Induction: intravenous    Postoperative Plan: Postoperative administration of opioids is intended.    Pertinent diagnostic labs and testing reviewed    Informed Consent:    Anesthetic plan and risks discussed with patient.    Use of blood products discussed with: patient whom consented to blood products.

## 2024-11-12 NOTE — OR NURSING
1005 pt meet criteria to return to G[facility], VSS, pain is at tolerable level. Report called to receiving RN.

## 2024-11-12 NOTE — OR NURSING
0932: Patient arrived to PACU from Endo via [facility]. Report received from anesthesia and RN. Respirations are spontaneous and unlabored. VSS on 6L. Dressing is CDI. Cold pack applied. pulse 2+, cap refill less than 3 seconds, warm, pink. OPA in place.    0932: c/o 6/10 abdominal pain. Chronic per patient and doesn't want pain meds at this time. Declines nausea and vomiting.    0946: family updated via phone call.    0950 pt care transferred to [facility].

## 2024-11-12 NOTE — PROGRESS NOTES
"Hospital Medicine Daily Progress Note    Date of Service  11/12/2024    Chief Complaint  Brenda Caratgena is a 39 y.o. female admitted 11/10/2024 with abdominal pain.    Hospital Course  As per chart review:  \"39 y.o. female with a past medical history of alcohol dependence who presented as a direct admission on 11/10/2024 with abnormal CT imaging concerning for possible pancreatic mass, versus pseudocyst.  Apparently the patient has multiple hospitalizations/emergency visits for alcohol withdrawal with and frequently leaves AGAINST MEDICAL ADVICE.  The patient has been having worsening abdominal pain.  The pain is in the epigastric region.  She denies having fevers or chills.\"    Interval Problem Update  11/11: Patient seen at bedside this morning.  Patient still complaining of abdominal pain.  Pending MRI.  Depending on MRI the patient might require GI consult.  The patient states that she has not seen a GI physician in [place], however it seems that Dr. Dawson has seen the patient in February 2014.  If GI needed will consult [facility].  -- MRI report has come out which is concerning for pancreatic ductal dilation with stones and debris.  We have consulted GI, I have discussed case with Dr. Gardner.  We appreciate further recommendations.  Patient currently n.p.o. just in case GI would like to perform a procedure.  -- I discussed again with GI, they are thinking the patient might require outpatient follow-up.  For now they would like conservative management with pain management.  Will start the patient on clear liquid diet for now and see if she can tolerate p.o.  --GI reached out again, and are contemplating EGD tomorrow. We will place patient NPO at midnight.    11/12: Patient seen at bedside this morning.  There is concern for upper GI bleeding.  Monitor hemoglobin.  Transfuse as needed.  Patient to undergo EGD today by GI.  We appreciate further recommendations.  Continue to monitor closely.  Patient still " complaining of significant abdominal pain.    I have discussed this patient's plan of care and discharge plan at IDT rounds today with Case Management, Nursing, Nursing leadership, and other members of the IDT team.    Consultants/Specialty  None for now    Code Status  Full Code    Disposition  The patient is not medically cleared for discharge to home or a post-acute facility.        Review of Systems  Review of Systems   Constitutional:  Positive for malaise/fatigue. Negative for chills and fever.   HENT:  Negative for hearing loss and nosebleeds.    Eyes:  Negative for blurred vision and double vision.   Respiratory:  Negative for cough and shortness of breath.    Cardiovascular:  Negative for chest pain and palpitations.   Gastrointestinal:  Positive for abdominal pain and nausea. Negative for heartburn.   Genitourinary:  Negative for dysuria and urgency.   Musculoskeletal:  Negative for back pain and falls.   Skin:  Negative for itching and rash.   Neurological:  Negative for dizziness and headaches.   Psychiatric/Behavioral:  Positive for substance abuse.    All other systems reviewed and are negative.       Physical Exam  Temp:  [35.6 °C (96.1 °F)-37 °C (98.6 °F)] 36.4 °C (97.6 °F)  Pulse:  [] 85  Resp:  [11-24] 18  BP: ()/(52-81) 129/52  SpO2:  [94 %-100 %] 95 %    Physical Exam  Vitals and nursing note reviewed.   Constitutional:       General: She is not in acute distress.     Appearance: She is ill-appearing.   HENT:      Head: Normocephalic and atraumatic.      Right Ear: External ear normal.      Left Ear: External ear normal.      Mouth/Throat:      Pharynx: No oropharyngeal exudate or posterior oropharyngeal erythema.   Eyes:      General:         Right eye: No discharge.         Left eye: No discharge.   Cardiovascular:      Rate and Rhythm: Normal rate and regular rhythm.      Pulses: Normal pulses.      Heart sounds: No murmur heard.     No gallop.   Pulmonary:      Effort: Pulmonary  effort is normal. No respiratory distress.      Breath sounds: Normal breath sounds. No wheezing or rhonchi.   Abdominal:      Tenderness: There is abdominal tenderness.   Musculoskeletal:         General: No swelling or tenderness. Normal range of motion.      Cervical back: Normal range of motion and neck supple. No tenderness.   Skin:     General: Skin is warm and dry.   Neurological:      General: No focal deficit present.      Mental Status: She is alert and oriented to person, place, and time. Mental status is at baseline.      Motor: No weakness.   Psychiatric:         Mood and Affect: Mood normal.         Behavior: Behavior normal.         Fluids    Intake/Output Summary (Last 24 hours) at 11/12/2024 1153  Last data filed at 11/12/2024 1115  Gross per 24 hour   Intake 580 ml   Output --   Net 580 ml        Laboratory  Recent Labs     11/10/24  1535 11/11/24  0145 11/12/24  0209   WBC 4.5* 3.4* 2.6*   RBC 3.10* 2.99* 2.88*   HEMOGLOBIN 9.6* 9.4* 9.0*   HEMATOCRIT 29.1* 29.2* 27.6*   MCV 93.9 97.7 95.8   MCH 31.0 31.4 31.3   MCHC 33.0 32.2 32.6   RDW 55.0* 57.1* 55.7*   PLATELETCT 88* 85* 122*   MPV 11.3 12.4 11.4     Recent Labs     11/10/24  1535 11/11/24  0145 11/12/24  0209   SODIUM 134* 131* 137   POTASSIUM 3.7 4.2 4.0   CHLORIDE 100 99 103   CO2 20 18* 20   GLUCOSE 132* 136* 91   BUN 9 9 3*   CREATININE 0.32* 0.31* 0.30*   CALCIUM 8.1* 8.1* 8.1*                   Imaging  MR-ABDOMEN-WITH & W/O   Final Result      1.  Pancreatic ductal dilation with stones and debris present within the pancreatic duct in the body and head.   2.  Cystic lesion with internal debris and/or stones in the distal pancreatic body measuring 15 mm with associated contour deformity of the pancreas.  IPMN favored although exact etiology is uncertain.   3.  No adenopathy demonstrated.   4.  Normal caliber common bile duct without stone.            US-OUTSIDE IMAGES-US ABDOMEN   Final Result      CT-OUTSIDE IMAGES-CT ABDOMEN/PELVIS  "  Final Result      US-OUTSIDE IMAGES-US ABDOMEN   Final Result      CT-OUTSIDE IMAGES-CT ABDOMEN/PELVIS    (Results Pending)        Assessment/Plan  * Intractable abdominal pain- (present on admission)  Assessment & Plan  Likely multifactorial secondary to alcoholic gastritis, chronic pancreatitis and possible pancreatic mass  I will start intravenous PPI.  Multimodal pain control  CT imaging at the transferring facility are concerning for possible pancreatic mass versus pseudocyst.      11/12: MRI reported: \"Pancreatic ductal dilation with stones and debris present within the pancreatic duct in the body and head. Cystic lesion with internal debris and/or stones in the distal pancreatic body measuring 15 mm with associated contour deformity of the pancreas.  IPMN favored although exact etiology is uncertain.\"  -- I have consulted GI, and they will follow-up with the patient as an outpatient.  The patient will most likely require endoscopic ultrasound, however as per GI as an outpatient.    -- Pain management. Also there is concern for upper GI bleeding.  PPI IV. Patient to undergo EGD today by GI.  We appreciate further recommendations.  Monitor hemoglobin.    Hypomagnesemia  Assessment & Plan  Replace as needed  monitor    Hyperglycemia  Assessment & Plan  --A1c is 5.3, no need for further management at this time.    CT imaging are concerning for possible pancreatic mass versus pseudocyst- (present on admission)  Assessment & Plan  CT imaging at the transferring facility are concerning for possible pancreatic mass versus pseudocyst.  I will check MRCP for further evaluation.      11/11: Pending MRI    11/12: MRI reported: \"Pancreatic ductal dilation with stones and debris present within the pancreatic duct in the body and head. Cystic lesion with internal debris and/or stones in the distal pancreatic body measuring 15 mm with associated contour deformity of the pancreas.  IPMN favored although exact etiology is " "uncertain.\"  -- I have consulted GI, and they will follow-up with the patient as an outpatient.  The patient will most likely require endoscopic ultrasound, however as per GI as an outpatient.    Leukopenia- (present on admission)  Assessment & Plan  Appears to be chronic.  Likely secondary to alcohol dependence.     Anemia- (present on admission)  Assessment & Plan  There was concern for upper GI bleeding  PPI IV    11/12: Patient to undergo EGD today, we appreciate further recommendations by GI.    Hyponatremia- (present on admission)  Assessment & Plan  Mild  Monitor  In the setting of alcohol abuse?    Alcohol dependence (HCC)- (present on admission)  Assessment & Plan  I will start on CIWA protocol   I will place on continuous cardiac monitoring  I will check K, Mg, Na, Ca. Monitor electrolytes and replace accordingly, particularly magnesium, potassium and phosphorus  I will order fall, seizure, & aspiration precautions.  I will order thiamine folic acid and multivitamin.      11/12: CIWA scores have remained low.  If they continue to remain low we will consider discontinuing CIWA protocol within the next 24 hours.    Thrombocytopenia (HCC)- (present on admission)  Assessment & Plan  Appears to be chronic.  Likely secondary to alcohol dependence  No evidence of gross bleeding at this point.  Continue to monitor platelet count.  I will order a follow-up CBC.           VTE prophylaxis: SCDs    I have performed a physical exam and reviewed and updated ROS and Plan today (11/12/2024). In review of yesterday's note (11/11/2024), there are no changes except as documented above.      I spend at least 52 minutes providing care for this patient.  This included face-to-face interview, physical examination.  Review of lab work including CBC, CMP, phosphorus, A1c, and magnesium.  Discussing with multidisciplinary team including case management, nursing staff and pharmacy.  Creating plan of care, reviewing orders.      "

## 2024-11-12 NOTE — PROGRESS NOTES
Received patient from PACU transport. She is awake and alert and able to walk from the hallway to the room without difficulty. IV LR restarted at 75 mL/hr. No complaints of pain or discomfort.

## 2024-11-12 NOTE — ANESTHESIA TIME REPORT
Anesthesia Start and Stop Event Times       Date Time Event    11/12/2024 0910 Ready for Procedure     0921 Anesthesia Start     0934 Anesthesia Stop          Responsible Staff  11/12/24      Name Role Begin End    Oscar Delvalle M.D. Anesth 0921 0934          Overtime Reason:  no overtime (within assigned shift)    Comments:

## 2024-11-12 NOTE — CARE PLAN
The patient is Stable - Low risk of patient condition declining or worsening    Shift Goals  Clinical Goals: Manage pain, GI procedure, Post anesthesia care  Patient Goals: Pain control, Comfort, Rest    Progress made toward(s) clinical / shift goals:  Patient continues to experience upper epigastric pain that is easily controlled with prescribed medications. She complained of mild nausea after eating 25% of a clear liquid lunch. IV LR continues at 75 mL/hr. Endoscopy revealed no significant findings. Plan per GI is to advance diet to low fat if tolerating without pain medication.    Patient is not progressing towards the following goals:

## 2024-11-12 NOTE — ANESTHESIA POSTPROCEDURE EVALUATION
Patient: Brenda Cartagena    Procedure Summary       Date: 11/12/24 Room / Location:  ENDOSCOPIC ULTRASOUND ROOM / SURGERY [facility]    Anesthesia Start: 0921 Anesthesia Stop: 0934    Procedure: GASTROSCOPY Diagnosis:       Abdominal pain      Esophagitis      Gastric erosion      (abdominal pain)    Surgeons: Umesh Gardner D.O. Responsible Provider: Oscar Delvalle M.D.    Anesthesia Type: MAC ASA Status: 2            Final Anesthesia Type: MAC  Last vitals  BP   Blood Pressure: (!) 135/98    Temp   36.7 °C (98 °F)    Pulse   (!) 101 (RN notified)   Resp   18    SpO2   96 %      Anesthesia Post Evaluation    Patient location during evaluation: PACU  Patient participation: complete - patient participated  Level of consciousness: awake and alert    Airway patency: patent  Anesthetic complications: no  Cardiovascular status: hemodynamically stable  Respiratory status: acceptable  Hydration status: euvolemic    PONV: none          No notable events documented.     Nurse Pain Score: 6 (NPRS)

## 2024-11-12 NOTE — PROGRESS NOTES
BSSR received from day shift RN. Patient laying in bed in no apparent distress with c/o pain. A&O X4. Plan of care discussed with patient. Bed in low position with bed brakes applied and call light in reach. Will medicate per MAR. Patient states no other needs at this time.

## 2024-11-12 NOTE — CONSULTS
DATE OF SERVICE:  11/11/2024     REASON FOR CONSULTATION:  Abdominal pain.     HISTORY OF PRESENT ILLNESS:  The patient is a 39-year-old alcoholic female who   comes in as a transfer for abnormal CT imaging for possible pancreatic mass   versus pseudocyst.  The patient underwent MRCP here and was found to have IPMN   versus debris in the pancreatic duct with normal bile ducts.  The patient   reports that she has been having bad epigastric and left upper quadrant   abdominal pain over the past week or so.  The patient reports it is associated   with nausea and vomiting, and reports she had coffee-ground emesis yesterday.    The patient denies current fever, chills, chest pain, shortness of breath,   unintentional weight loss or change in bowel habits.  When asked about when   her last bowel movement was, she thinks approximately 2 days ago.  The patient   reports this is not her usual as far as bowel movements.  The patient denies   hematochezia.  The patient does admit to significant daily acid reflux, but   denies any difficulty swallowing.     PAST MEDICAL HISTORY:  GERD.     PAST SURGICAL HISTORY:  Numerous orthopedic surgeries as well as   tonsillectomy.     FAMILY HISTORY:  Noncontributory.     SOCIAL HISTORY:  The patient admits to tobacco use and alcohol use.  The   patient reports 3-6 shots per day on average, at times heavier.  The patient   admits to marijuana use.     ALLERGIES:  PEANUTS AND TREE NUTS.     MEDICATIONS:  Prilosec, thiamine.     REVIEW OF SYSTEMS:  A 14-point review of systems was obtained and found to be   negative except for those above in the HPI.     PHYSICAL EXAMINATION:    GENERAL:  No acute distress, alert, awake and oriented x3.  VITAL SIGNS:  Stable.  HEENT:  PERRLA.  Extraocular movements intact.  Sclerae are anicteric.  HEART:  Regular rate and rhythm.  LUNGS:  Clear to auscultation bilaterally.  ABDOMEN:  Soft with mild tenderness to the epigastrium.  No guarding or   rebound  noticed.  MUSCULOSKELETAL:  No edema noted in bilateral lower extremities.  NEUROLOGIC:  Grossly intact.  PSYCHIATRIC:  Affect is normal.  SKIN:  No jaundice or pallor.     RESULTS:  IMAGING:  MRI of abdomen with and without contrast shows pancreatic ductal   dilatation with stones and debris present within the pancreatic duct in the   body and head, cystic lesion with internal debris and/or stones in the distal   pancreatic body measuring 15 mm with associated contour deformity of the   pancreas, IPMN favored although exact etiology is uncertain.  No adenopathy   demonstrated.  Normal caliber common bile duct without stone.     LABORATORY DATA:  White count 3.4, hemoglobin 9.4, hematocrit 29.2, platelets   85.  Sodium 131, potassium 4.2, , ALT 51, alkaline phosphatase 76,   total bilirubin 0.5, albumin 3.0, lipase 55.  Lactic acid 0.7.     ASSESSMENT AND PLAN:    1.  Gastrointestinal bleed.  The patient reports coffee-ground emesis.  The   patient is somewhat anemic.  Keep n.p.o. after midnight.  Plan for EGD   tomorrow when OR is available to accommodate.  Recommend IV PPI b.i.d. for now   and hold any anticoagulation.  2.  Alcoholic hepatitis secondary to chronic alcohol use.  The patient reports   her last drink was approximately 7-10 days ago.  Counseled cessation of this.  3.  Chronic pancreatitis.  The patient has possible stones and sludge versus   IPMN versus a combination of the two.  Recommend outpatient EUS with possible   ERCP and stenting at that time.  4.  Epigastric abdominal pain.  Unclear etiology.  The patient has normal   lipase.  I suspect this may not be secondary to her pancreas, but rather her   stomach.  Will plan for EGD in the morning.  5.  Constipation.  Recommend stool softener twice daily and a laxative daily   to encourage bowel movements, just in case constipation has a role in her   abdominal pain.  6.  Trend AST and ALT with daily CMP as well as CBC and PT/INR.  7.  Follow up  with [facility] nurse practitioner upon   discharge in approximately 4-6 weeks.  8.  We will follow along for now.  Call with any questions or concerns.        ______________________________  DO FATOU Vang    DD:  11/11/2024 14:19  DT:  11/11/2024 17:41    Job#:  [id number]

## 2024-11-12 NOTE — OP REPORT
DATE OF SERVICE:  11/12/2024     PROCEDURE:  Esophagogastroduodenoscopy.     PREOPERATIVE DIAGNOSIS:  Epigastric abdominal pain.     POSTOPERATIVE DIAGNOSES:  Gastric erosions and esophagitis.     ANESTHESIA:  Per anesthesiologist.     DESCRIPTION OF PROCEDURE:  After informed consent and appropriate sedation,   the patient was placed in left lateral position and the gastroscope was   advanced through the oropharynx into the esophagus, through to the second   portion of the duodenum.  The mucosa was then examined carefully.  The scope   was gently withdrawn.  The duodenum was unremarkable.  The gastric antrum   showed small gastric erosions and mild gastritis.  The gastric body and on   retroflexion, the gastric cardia and fundus were unremarkable.  Cold forceps   biopsies were taken of the gastric antrum to rule out Helicobacter pylori   infection.  The stomach was decompressed.  The scope was withdrawn back into   the esophagus.  There was LA grade A esophagitis and otherwise the remainder   of the esophagus was unremarkable.  There were no immediate postop   complications after the bowel was decompressed and the scope was withdrawn   carefully.     RECOMMENDATIONS:  1.  PPI twice daily such as omeprazole 20 mg.  2.  Avoid NSAIDs and aspirin.  3.  Outpatient EUS/ERCP.  The patient has seen Dr. Dawson in the past for   pancreatic work she says.  4.  Clear liquid diet, advance as tolerated to low fat diet if the patient is   not requiring any pain medications.  5.  Follow up on biopsies to rule out Helicobacter pylori infection.  If   positive, we will treat.  6.  Follow up with nurse practitioner in 4 weeks.  7.  We will sign off for now.  Please call with any questions or concerns.        ______________________________  DO HOLGER Vang    DD:  11/12/2024 09:34  DT:  11/12/2024 10:05    Job#:  [id number]

## 2024-11-12 NOTE — CARE PLAN
The patient is Stable - Low risk of patient condition declining or worsening    Shift Goals  Clinical Goals: remain fall free, pain controlled to less than 4, CIWA and MRI to be done today  Patient Goals: Pain and MRI    Progress made toward(s) clinical / shift goals:  Patient remained fall free. Tolerating pain. Given oain meds once during shift. CIWA remains zero    Patient is not progressing towards the following goals:

## 2024-11-12 NOTE — PROGRESS NOTES
Assumed care, patient is awake and alert. NPO status since midnight. Pre-OP called for report and report was given by NOC RN. IVPB Magnesium finished. Reviewed plan of care for the day, call light within reach, hourly rounding in place.

## 2024-11-12 NOTE — OR NURSING
1005: Report received from Katiuska. Report had been given to CARLINE Green by Katiuska. Awaiting [email or web address]

## 2024-11-12 NOTE — CARE PLAN
The patient is Stable - Low risk of patient condition declining or worsening    Shift Goals  Clinical Goals: Control pain at 4/10 or less post intervention  Patient Goals: Sleep/rest overnight    Progress made toward(s) clinical / shift goals:  NA    Patient is not progressing towards the following goals: Patient has required constant medication for pain overnight      Problem: Pain - Standard  Goal: Alleviation of pain or a reduction in pain to the patient’s comfort goal  Outcome: Not Progressing

## 2024-11-12 NOTE — OR NURSING
Patient allergies and NPO status verified, home medication reconciliation completed, belongings secured. Patient verbalizes understanding of pain scale, expected course of stay and plan of care. Surgical site verified with patient, IV access established.  Pt  pre-warmed w/ Yina hugger warming device. LR IV fluids kept running for conservation.

## 2024-11-13 ENCOUNTER — PHARMACY VISIT (OUTPATIENT)
Dept: PHARMACY | Facility: MEDICAL CENTER | Age: 39
End: 2024-11-13
Payer: COMMERCIAL

## 2024-11-13 VITALS
DIASTOLIC BLOOD PRESSURE: 66 MMHG | TEMPERATURE: 97 F | HEIGHT: 63 IN | SYSTOLIC BLOOD PRESSURE: 102 MMHG | BODY MASS INDEX: 20.98 KG/M2 | OXYGEN SATURATION: 94 % | WEIGHT: 118.39 LBS | RESPIRATION RATE: 16 BRPM | HEART RATE: 85 BPM

## 2024-11-13 LAB
ALBUMIN SERPL BCP-MCNC: 3.1 G/DL (ref 3.2–4.9)
ALBUMIN/GLOB SERPL: 1.2 G/DL
ALP SERPL-CCNC: 75 U/L (ref 30–99)
ALT SERPL-CCNC: 73 U/L (ref 2–50)
ANION GAP SERPL CALC-SCNC: 9 MMOL/L (ref 7–16)
AST SERPL-CCNC: 106 U/L (ref 12–45)
BASOPHILS # BLD AUTO: 0.4 % (ref 0–1.8)
BASOPHILS # BLD: 0.01 K/UL (ref 0–0.12)
BILIRUB SERPL-MCNC: 0.3 MG/DL (ref 0.1–1.5)
BUN SERPL-MCNC: 3 MG/DL (ref 8–22)
CALCIUM ALBUM COR SERPL-MCNC: 9.2 MG/DL (ref 8.5–10.5)
CALCIUM SERPL-MCNC: 8.5 MG/DL (ref 8.4–10.2)
CHLORIDE SERPL-SCNC: 102 MMOL/L (ref 96–112)
CO2 SERPL-SCNC: 25 MMOL/L (ref 20–33)
CREAT SERPL-MCNC: 0.4 MG/DL (ref 0.5–1.4)
EOSINOPHIL # BLD AUTO: 0.04 K/UL (ref 0–0.51)
EOSINOPHIL NFR BLD: 1.6 % (ref 0–6.9)
ERYTHROCYTE [DISTWIDTH] IN BLOOD BY AUTOMATED COUNT: 55 FL (ref 35.9–50)
GFR SERPLBLD CREATININE-BSD FMLA CKD-EPI: 127 ML/MIN/1.73 M 2
GLOBULIN SER CALC-MCNC: 2.6 G/DL (ref 1.9–3.5)
GLUCOSE SERPL-MCNC: 148 MG/DL (ref 65–99)
HCT VFR BLD AUTO: 27.9 % (ref 37–47)
HCT VFR BLD AUTO: 29.4 % (ref 37–47)
HGB BLD-MCNC: 9.1 G/DL (ref 12–16)
HGB BLD-MCNC: 9.4 G/DL (ref 12–16)
IMM GRANULOCYTES # BLD AUTO: 0.01 K/UL (ref 0–0.11)
IMM GRANULOCYTES NFR BLD AUTO: 0.4 % (ref 0–0.9)
LYMPHOCYTES # BLD AUTO: 0.82 K/UL (ref 1–4.8)
LYMPHOCYTES NFR BLD: 32.8 % (ref 22–41)
MAGNESIUM SERPL-MCNC: 1.6 MG/DL (ref 1.5–2.5)
MCH RBC QN AUTO: 30.9 PG (ref 27–33)
MCHC RBC AUTO-ENTMCNC: 32 G/DL (ref 32.2–35.5)
MCV RBC AUTO: 96.7 FL (ref 81.4–97.8)
MONOCYTES # BLD AUTO: 0.61 K/UL (ref 0–0.85)
MONOCYTES NFR BLD AUTO: 24.4 % (ref 0–13.4)
NEUTROPHILS # BLD AUTO: 1.01 K/UL (ref 1.82–7.42)
NEUTROPHILS NFR BLD: 40.4 % (ref 44–72)
NRBC # BLD AUTO: 0 K/UL
NRBC BLD-RTO: 0 /100 WBC (ref 0–0.2)
PHOSPHATE SERPL-MCNC: 3.8 MG/DL (ref 2.5–4.5)
PLATELET # BLD AUTO: 158 K/UL (ref 164–446)
PMV BLD AUTO: 10.9 FL (ref 9–12.9)
POTASSIUM SERPL-SCNC: 3.8 MMOL/L (ref 3.6–5.5)
PROT SERPL-MCNC: 5.7 G/DL (ref 6–8.2)
RBC # BLD AUTO: 3.04 M/UL (ref 4.2–5.4)
SODIUM SERPL-SCNC: 136 MMOL/L (ref 135–145)
WBC # BLD AUTO: 2.5 K/UL (ref 4.8–10.8)

## 2024-11-13 PROCEDURE — A9270 NON-COVERED ITEM OR SERVICE: HCPCS | Performed by: HOSPITALIST

## 2024-11-13 PROCEDURE — 94760 N-INVAS EAR/PLS OXIMETRY 1: CPT

## 2024-11-13 PROCEDURE — 36415 COLL VENOUS BLD VENIPUNCTURE: CPT

## 2024-11-13 PROCEDURE — 99239 HOSP IP/OBS DSCHRG MGMT >30: CPT | Performed by: STUDENT IN AN ORGANIZED HEALTH CARE EDUCATION/TRAINING PROGRAM

## 2024-11-13 PROCEDURE — A9270 NON-COVERED ITEM OR SERVICE: HCPCS | Performed by: INTERNAL MEDICINE

## 2024-11-13 PROCEDURE — 85025 COMPLETE CBC W/AUTO DIFF WBC: CPT

## 2024-11-13 PROCEDURE — RXMED WILLOW AMBULATORY MEDICATION CHARGE: Performed by: STUDENT IN AN ORGANIZED HEALTH CARE EDUCATION/TRAINING PROGRAM

## 2024-11-13 PROCEDURE — 85014 HEMATOCRIT: CPT

## 2024-11-13 PROCEDURE — 85018 HEMOGLOBIN: CPT

## 2024-11-13 PROCEDURE — 83735 ASSAY OF MAGNESIUM: CPT

## 2024-11-13 PROCEDURE — 84100 ASSAY OF PHOSPHORUS: CPT

## 2024-11-13 PROCEDURE — 700105 HCHG RX REV CODE 258: Performed by: INTERNAL MEDICINE

## 2024-11-13 PROCEDURE — 80053 COMPREHEN METABOLIC PANEL: CPT

## 2024-11-13 PROCEDURE — 700111 HCHG RX REV CODE 636 W/ 250 OVERRIDE (IP): Performed by: HOSPITALIST

## 2024-11-13 PROCEDURE — 700102 HCHG RX REV CODE 250 W/ 637 OVERRIDE(OP): Performed by: HOSPITALIST

## 2024-11-13 PROCEDURE — 700102 HCHG RX REV CODE 250 W/ 637 OVERRIDE(OP): Performed by: INTERNAL MEDICINE

## 2024-11-13 RX ORDER — FOLIC ACID 1 MG/1
1 TABLET ORAL DAILY
Qty: 30 TABLET | Refills: 0 | Status: SHIPPED | OUTPATIENT
Start: 2024-11-13

## 2024-11-13 RX ORDER — LANOLIN ALCOHOL/MO/W.PET/CERES
100 CREAM (GRAM) TOPICAL 2 TIMES DAILY
Qty: 30 TABLET | Refills: 0 | Status: SHIPPED | OUTPATIENT
Start: 2024-11-13 | End: 2024-12-13

## 2024-11-13 RX ADMIN — MULTIVITAMIN TABLET 1 TABLET: TABLET at 04:57

## 2024-11-13 RX ADMIN — SODIUM CHLORIDE, POTASSIUM CHLORIDE, SODIUM LACTATE AND CALCIUM CHLORIDE: 600; 310; 30; 20 INJECTION, SOLUTION INTRAVENOUS at 08:00

## 2024-11-13 RX ADMIN — ONDANSETRON 4 MG: 2 INJECTION INTRAMUSCULAR; INTRAVENOUS at 09:04

## 2024-11-13 RX ADMIN — Medication 100 MG: at 04:57

## 2024-11-13 RX ADMIN — OXYCODONE 5 MG: 5 TABLET ORAL at 08:45

## 2024-11-13 RX ADMIN — LORAZEPAM 0.5 MG: 0.5 TABLET ORAL at 13:44

## 2024-11-13 RX ADMIN — PANTOPRAZOLE SODIUM 40 MG: 40 INJECTION, POWDER, FOR SOLUTION INTRAVENOUS at 04:58

## 2024-11-13 RX ADMIN — LORAZEPAM 0.5 MG: 2 INJECTION INTRAMUSCULAR; INTRAVENOUS at 08:54

## 2024-11-13 RX ADMIN — OXYCODONE HYDROCHLORIDE 10 MG: 10 TABLET ORAL at 03:08

## 2024-11-13 RX ADMIN — FOLIC ACID 1 MG: 1 TABLET ORAL at 04:58

## 2024-11-13 RX ADMIN — OXYCODONE 5 MG: 5 TABLET ORAL at 13:43

## 2024-11-13 ASSESSMENT — PAIN DESCRIPTION - PAIN TYPE
TYPE: ACUTE PAIN

## 2024-11-13 ASSESSMENT — LIFESTYLE VARIABLES
PAROXYSMAL SWEATS: NO SWEAT VISIBLE
TOTAL SCORE: 2
AUDITORY DISTURBANCES: NOT PRESENT
AGITATION: *
AGITATION: NORMAL ACTIVITY
AUDITORY DISTURBANCES: NOT PRESENT
NAUSEA AND VOMITING: NO NAUSEA AND NO VOMITING
NAUSEA AND VOMITING: NO NAUSEA AND NO VOMITING
TOTAL SCORE: 2
VISUAL DISTURBANCES: NOT PRESENT
TREMOR: NO TREMOR
PAROXYSMAL SWEATS: NO SWEAT VISIBLE
ANXIETY: *
ORIENTATION AND CLOUDING OF SENSORIUM: ORIENTED AND CAN DO SERIAL ADDITIONS
HEADACHE, FULLNESS IN HEAD: VERY MILD
ORIENTATION AND CLOUDING OF SENSORIUM: ORIENTED AND CAN DO SERIAL ADDITIONS
TREMOR: NO TREMOR
AUDITORY DISTURBANCES: NOT PRESENT
TREMOR: NO TREMOR
TOTAL SCORE: 10
VISUAL DISTURBANCES: NOT PRESENT
HEADACHE, FULLNESS IN HEAD: VERY MILD
AGITATION: NORMAL ACTIVITY
TREMOR: NO TREMOR
PAROXYSMAL SWEATS: NO SWEAT VISIBLE
AGITATION: NORMAL ACTIVITY
ANXIETY: MILDLY ANXIOUS
ANXIETY: MILDLY ANXIOUS
VISUAL DISTURBANCES: NOT PRESENT
HEADACHE, FULLNESS IN HEAD: VERY MILD
AUDITORY DISTURBANCES: NOT PRESENT
NAUSEA AND VOMITING: NO NAUSEA AND NO VOMITING
NAUSEA AND VOMITING: NO NAUSEA AND NO VOMITING
HEADACHE, FULLNESS IN HEAD: VERY MILD
TOTAL SCORE: 7
ANXIETY: *
PAROXYSMAL SWEATS: NO SWEAT VISIBLE
ORIENTATION AND CLOUDING OF SENSORIUM: ORIENTED AND CAN DO SERIAL ADDITIONS
ORIENTATION AND CLOUDING OF SENSORIUM: ORIENTED AND CAN DO SERIAL ADDITIONS
VISUAL DISTURBANCES: NOT PRESENT

## 2024-11-13 ASSESSMENT — FIBROSIS 4 INDEX: FIB4 SCORE: 3.22

## 2024-11-13 NOTE — PROGRESS NOTES
Bedside report received by day RN and assumed care of patient at change of shift. Chart, labs, and orders reviewed. Pt resting in bed, breathing even and unlabored, denies pain and in no acute distress.   A&Ox4 on RA. Fall precautions in place and education provided. Call light within reach, bed locked and in lowest position, denies other needs at this time. Hourly rounding in progress.

## 2024-11-13 NOTE — DOCUMENTATION QUERY
"                                                                         [facility] access hospital                                                                       Query Response Note      PATIENT:               JL ASTUDILLO  ACCT #:                  [id number]  MRN:                     [id number]  :                      1985  ADMIT DATE:       11/10/2024 1:03 PM  DISCH DATE:          RESPONDING  PROVIDER #:        [id number]           QUERY TEXT:    Leukopenia, anemia, and thrombocytopenia are documented in the record. Please clarify the clinical/diagnostic relevance for the documented findings.    Note: If a query response diagnosis is provided, please include it in your daily notes and discharge summary.    The patient's clinical indicators include:  Clinical Findings:    - PN- Dr. Robin Jha:  \"Leukopenia- (present on admission)  Appears to be chronic.  Likely secondary to alcohol dependence.\"    \"Anemia- (present on admission)  There was concern for upper GI bleeding  PPI IV\"    \"Thrombocytopenia (HCC)- (present on admission)  Appears to be chronic.  Likely secondary to alcohol dependence  No evidence of gross bleeding at this point.  Continue to monitor platelet count.  I will order a follow-up CBC. \"    Labs trending:  Hgb: 9.6-9.4-9.0-11.2  Platelet count: 88-  WBC: 4.5-3.4-2.6  Absolute neutrophils:  2.12    Risk factors: Anemia, Leukopenia, Thrombocytopenia, alcohol dependence, hyponatremia; alcoholic gastritis, gastric erosions, esophagitis, concern for GI bleed- coffee ground emesis    Treatment:  CBC monitoring; IVF; EGD, GI consult    Please contact me with any questions:    Sarah CHATTERJEE RN CCDS  [facility]  [email or web address]  Sarah Bran via Voal[employer]  Options provided:   -- Pancytopenia due to alcohol dependence is clinically significant   -- Pancytopenia due to upper GI bleeding is clinically signifcant   -- Pancytopenia due to other cause- clinically significant, Please " specify other cause   -- Other explanation, (Please specify the other explanation)   -- No other additional diagnosis      Query created by: Sarah Bran on 11/12/2024 3:17 PM    RESPONSE TEXT:    Pancytopenia due to alcohol dependence is clinically significant          Electronically signed by:  KERRY RODRIGUEZ MD 11/12/2024 5:49 PM

## 2024-11-13 NOTE — DISCHARGE SUMMARY
Discharge Summary    CHIEF COMPLAINT ON ADMISSION  No chief complaint on file.      Reason for Admission  CBD dilatation     Admission Date  11/10/2024    CODE STATUS  Full Code    HPI & HOSPITAL COURSE  Brenda Cartagena is a 39-year-old woman with history of alcohol dependence who was transferred here as a direct admit on 11/10/2024 due to abnormal CT imaging concerning for possible pancreatic mass versus pseudocyst.  Patient has had multiple hospitalizations and emergency room visits for alcohol withdrawal with history of leaving AGAINST MEDICAL ADVICE.  She underwent MRI of the abdomen which showed pancreatic ductal dilation with stones and debris.  GI was consulted and she underwent EGD due to concern for possible upper GI bleed as well, which showed small gastric erosions and gastritis and grade a esophagitis.  Recommendations were to continue PPI twice daily, avoid all NSAIDs and will need further outpatient GI workup for EUS/ERCP.  Diet was advanced on 11/13, initially her pain was exacerbated again after eating some waffles, I recommended a bland lunch such as rice and chicken breast which she tolerated well, continued to feel improved and was ready for discharge.  Prescriptions for PPI as well as folate, thiamine and multivitamin were filled for patient.  Referral to GI was ordered as well.  I recommended a bland and low-fat diet for the patient and gradual reintroduction of foods, avoiding foods she does not tolerated.      Therefore, she is discharged in fair and stable condition to home with close outpatient follow-up.    The patient met 2-midnight criteria for an inpatient stay at the time of discharge.    Discharge Date  11/13/2024    FOLLOW UP ITEMS POST DISCHARGE  Follow-up with GI for EUS/ERCP    DISCHARGE DIAGNOSES  Principal Problem:    Intractable abdominal pain (POA: Yes)  Active Problems:    Thrombocytopenia (HCC) (POA: Yes)    Alcohol dependence (HCC) (POA: Yes)    Hyponatremia (POA: Yes)     Anemia (POA: Yes)    Leukopenia (POA: Yes)    CT imaging are concerning for possible pancreatic mass versus pseudocyst (POA: Yes)    Hyperglycemia (POA: Unknown)    Hypomagnesemia (POA: Unknown)  Resolved Problems:    * No resolved hospital problems. *      FOLLOW UP  No future appointments.  No follow-up provider specified.    MEDICATIONS ON DISCHARGE     Medication List        START taking these medications        Instructions   folic acid 1 MG Tabs  Commonly known as: Folvite   Take 1 Tablet by mouth every day.  Dose: 1 mg     multivitamin Tabs   Take 1 Tablet by mouth every day.  Dose: 1 Tablet            CONTINUE taking these medications        Instructions   omeprazole 20 MG delayed-release capsule  Commonly known as: PriLOSEC   Take 1 Capsule by mouth 2 times a day for 30 days.  Dose: 20 mg     thiamine 100 MG tablet  Commonly known as: Thiamine   Take 1 Tablet by mouth 2 times a day for 30 days.  Dose: 100 mg              Allergies  Allergies   Allergen Reactions    Peanut-Derived Swelling     Tongue swelling    Tree Nuts Food Allergy Swelling     All nuts        DIET  Orders Placed This Encounter   Procedures    Diet Order Diet: Low Fiber(GI Soft); Nutrient modifications: (optional): Low Fat; Miscellaneous modifications: (optional): Gluten Free     Standing Status:   Standing     Number of Occurrences:   1     Order Specific Question:   Diet:     Answer:   Low Fiber(GI Soft) [2]     Order Specific Question:   Nutrient modifications: (optional)     Answer:   Low Fat [5]     Order Specific Question:   Miscellaneous modifications: (optional)     Answer:   Gluten Free [9]       ACTIVITY  As tolerated.  Weight bearing as tolerated    CONSULTATIONS  GI    PROCEDURES  EGD    LABORATORY  Lab Results   Component Value Date    SODIUM 136 11/13/2024    POTASSIUM 3.8 11/13/2024    CHLORIDE 102 11/13/2024    CO2 25 11/13/2024    GLUCOSE 148 (H) 11/13/2024    BUN 3 (L) 11/13/2024    CREATININE 0.40 (L) 11/13/2024         Lab Results   Component Value Date    WBC 2.5 (L) 11/13/2024    HEMOGLOBIN 9.1 (L) 11/13/2024    HEMATOCRIT 27.9 (L) 11/13/2024    PLATELETCT 158 (L) 11/13/2024        Total time of the discharge process exceeds 43 minutes.

## 2024-11-13 NOTE — CARE PLAN
The patient is Stable - Low risk of patient condition declining or worsening    Shift Goals  Clinical Goals: CIWA, pain management  Patient Goals: pain control  Family Goals: IRINEO    Progress made toward(s) clinical / shift goals:    CIWA protocol followed, medicated per MAR per orders. Pain managed by medication per MAR, rest and repositioning. CIWA scores 2-8 for this shift.     Patient is not progressing towards the following goals:

## 2024-11-13 NOTE — DISCHARGE PLANNING
Case Management Discharge Planning    Admission Date: 11/10/2024  GMLOS: 4.5  ALOS: 3    6-Clicks ADL Score: 24  6-Clicks Mobility Score: 24      Anticipated Discharge Dispo: Discharge Disposition: Discharged to home/self care (01)    DME Needed: No    Action(s) Taken:     RNCM attended IDT rounds and reviewed chart. No CM needs noted at this time.    Escalations Completed: None    Medically Clear: No    Next Steps: Follow-up with medical team to discuss DC needs and barriers.    Barriers to Discharge: Medical clearance        Care Transition Team Assessment    Information Source  Who is responsible for making decisions for patient? : Patient    Readmission Evaluation  Is this a readmission?: No    Elopement Risk  Legal Hold: No  Ambulatory or Self Mobile in Wheelchair: Yes  Disoriented: No  Psychiatric Symptoms: None  History of Wandering: No  Elopement this Admit: No  Vocalizing Wanting to Leave: No  Displays Behaviors, Body Language Wanting to Leave: No-Not at Risk for Elopement  Elopement Risk: Not at Risk for Elopement    Interdisciplinary Discharge Planning  Lives with - Patient's Self Care Capacity: Significant Other  Patient or legal guardian wants to designate a caregiver: No  Support Systems: Family Member(s), Friends / Neighbors, Spouse / Significant Other  Housing / Facility: 1 Story Apartment / Condo    Discharge Preparedness  What is your plan after discharge?: Home with help  What are your discharge supports?: Parent, Partner    Finances  Financial Barriers to Discharge: No  Prescription Coverage: Yes    Vision / Hearing Impairment  Vision Impairment : No  Hearing Impairment : No    Advance Directive  Advance Directive?: None    Domestic Abuse  Possible Abuse/Neglect Reported to:: Not Applicable    Psychological Assessment  History of Substance Abuse: Alcohol, Methamphetamine    Discharge Risks or Barriers  Discharge risks or barriers?: Substance abuse  Patient risk factors: Substance  abuse    Anticipated Discharge Information  Discharge Disposition: Discharged to home/self care (01)

## 2024-11-13 NOTE — PROGRESS NOTES
0700 Received report from Night shift nurse  0845 Performed assessment  Pt is A&Ox4, complains of 6/10 pain; medication given per MAR, Updated on POC: manage pain, monitor for CIWA  Call light within reach, hourly rounding in place, bed in lowest position.

## 2024-11-13 NOTE — DISCHARGE INSTRUCTIONS
You will need to follow-up with gastroenterology for further workup, referrals were sent, you can also follow-up with Dr. Dawson who you have established with.  Continue with bland and low-fat diet, gradually reintroducing foods and avoiding foods that exacerbate your abdominal pain

## 2024-11-14 ENCOUNTER — TELEPHONE (OUTPATIENT)
Dept: HEALTH INFORMATION MANAGEMENT | Facility: OTHER | Age: 39
End: 2024-11-14
Payer: MEDICAID

## 2024-11-14 NOTE — PROGRESS NOTES
Pt discharged to home. Discharge instructions provided to pt. Pt verbalizes understanding. Pt states all questions have been answered. Signed copy in chart. Prescriptions sent to bedside. Pt states that all personal belongings are in possession. Pt off unit via self (walking), escorted by significant other. Refused wheelchair

## 2024-12-17 ENCOUNTER — HOSPITAL ENCOUNTER (INPATIENT)
Facility: MEDICAL CENTER | Age: 39
LOS: 3 days | DRG: 438 | End: 2024-12-23
Attending: STUDENT IN AN ORGANIZED HEALTH CARE EDUCATION/TRAINING PROGRAM | Admitting: STUDENT IN AN ORGANIZED HEALTH CARE EDUCATION/TRAINING PROGRAM
Payer: MEDICAID

## 2024-12-17 ENCOUNTER — APPOINTMENT (OUTPATIENT)
Dept: RADIOLOGY | Facility: MEDICAL CENTER | Age: 39
DRG: 438 | End: 2024-12-17
Attending: STUDENT IN AN ORGANIZED HEALTH CARE EDUCATION/TRAINING PROGRAM
Payer: MEDICAID

## 2024-12-17 DIAGNOSIS — R10.9 INTRACTABLE ABDOMINAL PAIN: ICD-10-CM

## 2024-12-17 DIAGNOSIS — F11.20 NARCOTIC DEPENDENCE (HCC): ICD-10-CM

## 2024-12-17 DIAGNOSIS — R10.10 PAIN OF UPPER ABDOMEN: ICD-10-CM

## 2024-12-17 DIAGNOSIS — T40.2X5A CONSTIPATION DUE TO OPIOID THERAPY: ICD-10-CM

## 2024-12-17 DIAGNOSIS — K59.03 CONSTIPATION DUE TO OPIOID THERAPY: ICD-10-CM

## 2024-12-17 DIAGNOSIS — K86.89 PANCREATIC DUCT STONES: ICD-10-CM

## 2024-12-17 LAB
ALBUMIN SERPL BCP-MCNC: 3.8 G/DL (ref 3.2–4.9)
ALBUMIN/GLOB SERPL: 1.2 G/DL
ALP SERPL-CCNC: 70 U/L (ref 30–99)
ALT SERPL-CCNC: 17 U/L (ref 2–50)
AMPHET UR QL SCN: NEGATIVE
ANION GAP SERPL CALC-SCNC: 14 MMOL/L (ref 7–16)
AST SERPL-CCNC: 30 U/L (ref 12–45)
BARBITURATES UR QL SCN: NEGATIVE
BASOPHILS # BLD AUTO: 0.3 % (ref 0–1.8)
BASOPHILS # BLD: 0.02 K/UL (ref 0–0.12)
BENZODIAZ UR QL SCN: POSITIVE
BILIRUB SERPL-MCNC: 0.6 MG/DL (ref 0.1–1.5)
BUN SERPL-MCNC: 9 MG/DL (ref 8–22)
BZE UR QL SCN: NEGATIVE
CALCIUM ALBUM COR SERPL-MCNC: 8.7 MG/DL (ref 8.5–10.5)
CALCIUM SERPL-MCNC: 8.5 MG/DL (ref 8.5–10.5)
CANNABINOIDS UR QL SCN: NEGATIVE
CHLORIDE SERPL-SCNC: 105 MMOL/L (ref 96–112)
CO2 SERPL-SCNC: 18 MMOL/L (ref 20–33)
CREAT SERPL-MCNC: 0.28 MG/DL (ref 0.5–1.4)
EOSINOPHIL # BLD AUTO: 0.02 K/UL (ref 0–0.51)
EOSINOPHIL NFR BLD: 0.3 % (ref 0–6.9)
ERYTHROCYTE [DISTWIDTH] IN BLOOD BY AUTOMATED COUNT: 48.7 FL (ref 35.9–50)
FENTANYL UR QL: NEGATIVE
GFR SERPLBLD CREATININE-BSD FMLA CKD-EPI: 138 ML/MIN/1.73 M 2
GLOBULIN SER CALC-MCNC: 3.3 G/DL (ref 1.9–3.5)
GLUCOSE SERPL-MCNC: 133 MG/DL (ref 65–99)
HCG SERPL QL: NEGATIVE
HCT VFR BLD AUTO: 28.7 % (ref 37–47)
HGB BLD-MCNC: 9.7 G/DL (ref 12–16)
IMM GRANULOCYTES # BLD AUTO: 0.02 K/UL (ref 0–0.11)
IMM GRANULOCYTES NFR BLD AUTO: 0.3 % (ref 0–0.9)
LIPASE SERPL-CCNC: 55 U/L (ref 11–82)
LYMPHOCYTES # BLD AUTO: 1.76 K/UL (ref 1–4.8)
LYMPHOCYTES NFR BLD: 29.8 % (ref 22–41)
MCH RBC QN AUTO: 30.3 PG (ref 27–33)
MCHC RBC AUTO-ENTMCNC: 33.8 G/DL (ref 32.2–35.5)
MCV RBC AUTO: 89.7 FL (ref 81.4–97.8)
METHADONE UR QL SCN: NEGATIVE
MONOCYTES # BLD AUTO: 0.81 K/UL (ref 0–0.85)
MONOCYTES NFR BLD AUTO: 13.7 % (ref 0–13.4)
NEUTROPHILS # BLD AUTO: 3.27 K/UL (ref 1.82–7.42)
NEUTROPHILS NFR BLD: 55.6 % (ref 44–72)
NRBC # BLD AUTO: 0 K/UL
NRBC BLD-RTO: 0 /100 WBC (ref 0–0.2)
OPIATES UR QL SCN: NEGATIVE
OXYCODONE UR QL SCN: NEGATIVE
PCP UR QL SCN: NEGATIVE
PLATELET # BLD AUTO: 201 K/UL (ref 164–446)
PMV BLD AUTO: 10.9 FL (ref 9–12.9)
POTASSIUM SERPL-SCNC: 3.6 MMOL/L (ref 3.6–5.5)
PROPOXYPH UR QL SCN: NEGATIVE
PROT SERPL-MCNC: 7.1 G/DL (ref 6–8.2)
RBC # BLD AUTO: 3.2 M/UL (ref 4.2–5.4)
SODIUM SERPL-SCNC: 137 MMOL/L (ref 135–145)
WBC # BLD AUTO: 5.9 K/UL (ref 4.8–10.8)

## 2024-12-17 PROCEDURE — 74170 CT ABD WO CNTRST FLWD CNTRST: CPT

## 2024-12-17 PROCEDURE — 80307 DRUG TEST PRSMV CHEM ANLYZR: CPT

## 2024-12-17 PROCEDURE — 96374 THER/PROPH/DIAG INJ IV PUSH: CPT

## 2024-12-17 PROCEDURE — 700111 HCHG RX REV CODE 636 W/ 250 OVERRIDE (IP): Mod: UD | Performed by: STUDENT IN AN ORGANIZED HEALTH CARE EDUCATION/TRAINING PROGRAM

## 2024-12-17 PROCEDURE — 99285 EMERGENCY DEPT VISIT HI MDM: CPT

## 2024-12-17 PROCEDURE — 80053 COMPREHEN METABOLIC PANEL: CPT

## 2024-12-17 PROCEDURE — 85025 COMPLETE CBC W/AUTO DIFF WBC: CPT

## 2024-12-17 PROCEDURE — 83690 ASSAY OF LIPASE: CPT

## 2024-12-17 PROCEDURE — 84703 CHORIONIC GONADOTROPIN ASSAY: CPT

## 2024-12-17 PROCEDURE — 700117 HCHG RX CONTRAST REV CODE 255: Performed by: STUDENT IN AN ORGANIZED HEALTH CARE EDUCATION/TRAINING PROGRAM

## 2024-12-17 PROCEDURE — 99223 1ST HOSP IP/OBS HIGH 75: CPT | Performed by: STUDENT IN AN ORGANIZED HEALTH CARE EDUCATION/TRAINING PROGRAM

## 2024-12-17 PROCEDURE — 36415 COLL VENOUS BLD VENIPUNCTURE: CPT

## 2024-12-17 RX ORDER — HYDROMORPHONE HYDROCHLORIDE 1 MG/ML
1 INJECTION, SOLUTION INTRAMUSCULAR; INTRAVENOUS; SUBCUTANEOUS ONCE
Status: COMPLETED | OUTPATIENT
Start: 2024-12-17 | End: 2024-12-17

## 2024-12-17 RX ADMIN — IOHEXOL 80 ML: 350 INJECTION, SOLUTION INTRAVENOUS at 22:50

## 2024-12-17 RX ADMIN — HYDROMORPHONE HYDROCHLORIDE 1 MG: 1 INJECTION, SOLUTION INTRAMUSCULAR; INTRAVENOUS; SUBCUTANEOUS at 21:01

## 2024-12-17 ASSESSMENT — PAIN DESCRIPTION - PAIN TYPE
TYPE: ACUTE PAIN

## 2024-12-17 ASSESSMENT — FIBROSIS 4 INDEX: FIB4 SCORE: 3.22

## 2024-12-18 ENCOUNTER — TELEPHONE (OUTPATIENT)
Dept: SURGICAL ONCOLOGY | Facility: MEDICAL CENTER | Age: 39
End: 2024-12-18
Payer: MEDICAID

## 2024-12-18 PROBLEM — K86.0 ALCOHOL-INDUCED CHRONIC PANCREATITIS (HCC): Status: ACTIVE | Noted: 2024-12-18

## 2024-12-18 PROBLEM — F41.9 ANXIETY: Status: ACTIVE | Noted: 2024-12-18

## 2024-12-18 PROBLEM — K86.89 PANCREATIC DUCT STONES: Status: ACTIVE | Noted: 2024-12-18

## 2024-12-18 PROBLEM — K86.1 CHRONIC PANCREATITIS (HCC): Status: ACTIVE | Noted: 2024-12-18

## 2024-12-18 PROBLEM — K59.03 CONSTIPATION DUE TO OPIOID THERAPY: Status: ACTIVE | Noted: 2024-12-18

## 2024-12-18 PROBLEM — K31.9 GASTRIC LESION: Status: ACTIVE | Noted: 2024-12-18

## 2024-12-18 PROBLEM — T40.2X5A CONSTIPATION DUE TO OPIOID THERAPY: Status: ACTIVE | Noted: 2024-12-18

## 2024-12-18 PROBLEM — K86.2 CYSTIC MASS OF PANCREAS: Status: ACTIVE | Noted: 2024-12-18

## 2024-12-18 PROBLEM — L30.9 ECZEMA: Status: ACTIVE | Noted: 2024-12-18

## 2024-12-18 PROBLEM — D49.0 IPMN (INTRADUCTAL PAPILLARY MUCINOUS NEOPLASM): Status: ACTIVE | Noted: 2024-12-18

## 2024-12-18 LAB
CANCER AG19-9 SERPL-ACNC: 24.5 U/ML (ref 0–35)
CEA SERPL-MCNC: 2 NG/ML (ref 0–3)
FERRITIN SERPL-MCNC: 179 NG/ML (ref 10–291)
FOLATE SERPL-MCNC: >40 NG/ML
LDH SERPL L TO P-CCNC: 172 U/L (ref 107–266)
MAGNESIUM SERPL-MCNC: 1.7 MG/DL (ref 1.5–2.5)
PHOSPHATE SERPL-MCNC: 2.5 MG/DL (ref 2.5–4.5)
TSH SERPL DL<=0.005 MIU/L-ACNC: 0.62 UIU/ML (ref 0.38–5.33)
VIT B12 SERPL-MCNC: 591 PG/ML (ref 211–911)

## 2024-12-18 PROCEDURE — 86301 IMMUNOASSAY TUMOR CA 19-9: CPT

## 2024-12-18 PROCEDURE — 96374 THER/PROPH/DIAG INJ IV PUSH: CPT

## 2024-12-18 PROCEDURE — 82746 ASSAY OF FOLIC ACID SERUM: CPT

## 2024-12-18 PROCEDURE — 82728 ASSAY OF FERRITIN: CPT

## 2024-12-18 PROCEDURE — 84443 ASSAY THYROID STIM HORMONE: CPT

## 2024-12-18 PROCEDURE — A9270 NON-COVERED ITEM OR SERVICE: HCPCS | Mod: UD | Performed by: STUDENT IN AN ORGANIZED HEALTH CARE EDUCATION/TRAINING PROGRAM

## 2024-12-18 PROCEDURE — 99254 IP/OBS CNSLTJ NEW/EST MOD 60: CPT | Performed by: INTERNAL MEDICINE

## 2024-12-18 PROCEDURE — 83615 LACTATE (LD) (LDH) ENZYME: CPT

## 2024-12-18 PROCEDURE — 82378 CARCINOEMBRYONIC ANTIGEN: CPT

## 2024-12-18 PROCEDURE — 83735 ASSAY OF MAGNESIUM: CPT

## 2024-12-18 PROCEDURE — 700111 HCHG RX REV CODE 636 W/ 250 OVERRIDE (IP): Mod: JZ,UD | Performed by: STUDENT IN AN ORGANIZED HEALTH CARE EDUCATION/TRAINING PROGRAM

## 2024-12-18 PROCEDURE — 82607 VITAMIN B-12: CPT

## 2024-12-18 PROCEDURE — 36415 COLL VENOUS BLD VENIPUNCTURE: CPT

## 2024-12-18 PROCEDURE — 700102 HCHG RX REV CODE 250 W/ 637 OVERRIDE(OP): Performed by: STUDENT IN AN ORGANIZED HEALTH CARE EDUCATION/TRAINING PROGRAM

## 2024-12-18 PROCEDURE — 99233 SBSQ HOSP IP/OBS HIGH 50: CPT | Performed by: STUDENT IN AN ORGANIZED HEALTH CARE EDUCATION/TRAINING PROGRAM

## 2024-12-18 PROCEDURE — 700102 HCHG RX REV CODE 250 W/ 637 OVERRIDE(OP): Mod: UD | Performed by: STUDENT IN AN ORGANIZED HEALTH CARE EDUCATION/TRAINING PROGRAM

## 2024-12-18 PROCEDURE — G0378 HOSPITAL OBSERVATION PER HR: HCPCS

## 2024-12-18 PROCEDURE — 700111 HCHG RX REV CODE 636 W/ 250 OVERRIDE (IP): Mod: UD | Performed by: STUDENT IN AN ORGANIZED HEALTH CARE EDUCATION/TRAINING PROGRAM

## 2024-12-18 PROCEDURE — 96375 TX/PRO/DX INJ NEW DRUG ADDON: CPT

## 2024-12-18 PROCEDURE — 84100 ASSAY OF PHOSPHORUS: CPT

## 2024-12-18 PROCEDURE — A9270 NON-COVERED ITEM OR SERVICE: HCPCS | Performed by: STUDENT IN AN ORGANIZED HEALTH CARE EDUCATION/TRAINING PROGRAM

## 2024-12-18 RX ORDER — BUPRENORPHINE 2 MG/1
2 TABLET SUBLINGUAL 2 TIMES DAILY
Status: DISCONTINUED | OUTPATIENT
Start: 2024-12-18 | End: 2024-12-19

## 2024-12-18 RX ORDER — OXYCODONE HYDROCHLORIDE 10 MG/1
10 TABLET ORAL EVERY 6 HOURS PRN
Status: ON HOLD | COMMUNITY
Start: 2024-12-13 | End: 2024-12-23

## 2024-12-18 RX ORDER — PROMETHAZINE HYDROCHLORIDE 25 MG/1
12.5-25 TABLET ORAL EVERY 4 HOURS PRN
Status: DISCONTINUED | OUTPATIENT
Start: 2024-12-18 | End: 2024-12-23 | Stop reason: HOSPADM

## 2024-12-18 RX ORDER — DULOXETIN HYDROCHLORIDE 30 MG/1
30 CAPSULE, DELAYED RELEASE ORAL DAILY
Status: DISCONTINUED | OUTPATIENT
Start: 2024-12-18 | End: 2024-12-21

## 2024-12-18 RX ORDER — OXYCODONE HCL 10 MG/1
10 TABLET, FILM COATED, EXTENDED RELEASE ORAL EVERY 6 HOURS PRN
Status: SHIPPED | COMMUNITY
End: 2024-12-18

## 2024-12-18 RX ORDER — HYDROMORPHONE HYDROCHLORIDE 1 MG/ML
1 INJECTION, SOLUTION INTRAMUSCULAR; INTRAVENOUS; SUBCUTANEOUS
Status: DISCONTINUED | OUTPATIENT
Start: 2024-12-18 | End: 2024-12-23 | Stop reason: HOSPADM

## 2024-12-18 RX ORDER — LORAZEPAM 1 MG/1
1 TABLET ORAL EVERY 8 HOURS PRN
Status: COMPLETED | OUTPATIENT
Start: 2024-12-18 | End: 2024-12-18

## 2024-12-18 RX ORDER — HYDROCODONE BITARTRATE AND ACETAMINOPHEN 5; 325 MG/1; MG/1
1 TABLET ORAL EVERY 4 HOURS PRN
Status: DISCONTINUED | OUTPATIENT
Start: 2024-12-18 | End: 2024-12-18

## 2024-12-18 RX ORDER — HYDROCODONE BITARTRATE AND ACETAMINOPHEN 5; 325 MG/1; MG/1
1 TABLET ORAL EVERY 6 HOURS PRN
Status: DISCONTINUED | OUTPATIENT
Start: 2024-12-18 | End: 2024-12-18

## 2024-12-18 RX ORDER — BENZOCAINE/MENTHOL 6 MG-10 MG
LOZENGE MUCOUS MEMBRANE 2 TIMES DAILY
Status: DISCONTINUED | OUTPATIENT
Start: 2024-12-18 | End: 2024-12-23 | Stop reason: HOSPADM

## 2024-12-18 RX ORDER — PROCHLORPERAZINE EDISYLATE 5 MG/ML
5-10 INJECTION INTRAMUSCULAR; INTRAVENOUS EVERY 4 HOURS PRN
Status: DISCONTINUED | OUTPATIENT
Start: 2024-12-18 | End: 2024-12-23 | Stop reason: HOSPADM

## 2024-12-18 RX ORDER — FOLIC ACID 1 MG/1
1 TABLET ORAL DAILY
Status: DISCONTINUED | OUTPATIENT
Start: 2024-12-18 | End: 2024-12-23 | Stop reason: HOSPADM

## 2024-12-18 RX ORDER — LABETALOL HYDROCHLORIDE 5 MG/ML
10 INJECTION, SOLUTION INTRAVENOUS EVERY 4 HOURS PRN
Status: DISCONTINUED | OUTPATIENT
Start: 2024-12-18 | End: 2024-12-23 | Stop reason: HOSPADM

## 2024-12-18 RX ORDER — ONDANSETRON 4 MG/1
4 TABLET, ORALLY DISINTEGRATING ORAL EVERY 4 HOURS PRN
Status: DISCONTINUED | OUTPATIENT
Start: 2024-12-18 | End: 2024-12-23 | Stop reason: HOSPADM

## 2024-12-18 RX ORDER — POLYETHYLENE GLYCOL 3350 17 G/17G
1 POWDER, FOR SOLUTION ORAL
Status: DISCONTINUED | OUTPATIENT
Start: 2024-12-18 | End: 2024-12-23 | Stop reason: HOSPADM

## 2024-12-18 RX ORDER — ACETAMINOPHEN 325 MG/1
650 TABLET ORAL EVERY 6 HOURS PRN
Status: DISCONTINUED | OUTPATIENT
Start: 2024-12-18 | End: 2024-12-23 | Stop reason: HOSPADM

## 2024-12-18 RX ORDER — OXYCODONE HYDROCHLORIDE 10 MG/1
10 TABLET ORAL
Status: DISCONTINUED | OUTPATIENT
Start: 2024-12-18 | End: 2024-12-23 | Stop reason: HOSPADM

## 2024-12-18 RX ORDER — OXYCODONE HYDROCHLORIDE 5 MG/1
5 TABLET ORAL
Status: DISCONTINUED | OUTPATIENT
Start: 2024-12-18 | End: 2024-12-23 | Stop reason: HOSPADM

## 2024-12-18 RX ORDER — ONDANSETRON 2 MG/ML
4 INJECTION INTRAMUSCULAR; INTRAVENOUS EVERY 4 HOURS PRN
Status: DISCONTINUED | OUTPATIENT
Start: 2024-12-18 | End: 2024-12-23 | Stop reason: HOSPADM

## 2024-12-18 RX ORDER — OXYCODONE HYDROCHLORIDE 5 MG/1
5 TABLET ORAL EVERY 6 HOURS PRN
Status: ON HOLD | COMMUNITY
End: 2024-12-23

## 2024-12-18 RX ORDER — PROMETHAZINE HYDROCHLORIDE 25 MG/1
12.5-25 SUPPOSITORY RECTAL EVERY 4 HOURS PRN
Status: DISCONTINUED | OUTPATIENT
Start: 2024-12-18 | End: 2024-12-23 | Stop reason: HOSPADM

## 2024-12-18 RX ORDER — MORPHINE SULFATE 4 MG/ML
4 INJECTION INTRAVENOUS EVERY 4 HOURS PRN
Status: DISCONTINUED | OUTPATIENT
Start: 2024-12-18 | End: 2024-12-18

## 2024-12-18 RX ORDER — AMOXICILLIN 250 MG
2 CAPSULE ORAL EVERY EVENING
Status: DISCONTINUED | OUTPATIENT
Start: 2024-12-18 | End: 2024-12-23 | Stop reason: HOSPADM

## 2024-12-18 RX ADMIN — DULOXETINE HYDROCHLORIDE 30 MG: 30 CAPSULE, DELAYED RELEASE ORAL at 20:32

## 2024-12-18 RX ADMIN — HYDROMORPHONE HYDROCHLORIDE 1 MG: 1 INJECTION, SOLUTION INTRAMUSCULAR; INTRAVENOUS; SUBCUTANEOUS at 17:45

## 2024-12-18 RX ADMIN — OXYCODONE HYDROCHLORIDE 10 MG: 10 TABLET ORAL at 23:53

## 2024-12-18 RX ADMIN — OXYCODONE HYDROCHLORIDE 10 MG: 10 TABLET ORAL at 16:02

## 2024-12-18 RX ADMIN — HYDROCODONE BITARTRATE AND ACETAMINOPHEN 1 TABLET: 5; 325 TABLET ORAL at 00:53

## 2024-12-18 RX ADMIN — FOLIC ACID 1 MG: 1 TABLET ORAL at 10:19

## 2024-12-18 RX ADMIN — HYDROCODONE BITARTRATE AND ACETAMINOPHEN 1 TABLET: 5; 325 TABLET ORAL at 12:59

## 2024-12-18 RX ADMIN — LORAZEPAM 1 MG: 1 TABLET ORAL at 00:53

## 2024-12-18 RX ADMIN — SENNOSIDES AND DOCUSATE SODIUM 2 TABLET: 50; 8.6 TABLET ORAL at 17:46

## 2024-12-18 RX ADMIN — BUPRENORPHINE HCL 2 MG: 2 TABLET SUBLINGUAL at 17:46

## 2024-12-18 RX ADMIN — MORPHINE SULFATE 8 MG: 10 INJECTION INTRAVENOUS at 08:04

## 2024-12-18 RX ADMIN — ONDANSETRON 4 MG: 4 TABLET, ORALLY DISINTEGRATING ORAL at 23:53

## 2024-12-18 RX ADMIN — OXYCODONE HYDROCHLORIDE 10 MG: 10 TABLET ORAL at 20:31

## 2024-12-18 RX ADMIN — OMEPRAZOLE 20 MG: 20 CAPSULE, DELAYED RELEASE ORAL at 04:25

## 2024-12-18 RX ADMIN — OMEPRAZOLE 20 MG: 20 CAPSULE, DELAYED RELEASE ORAL at 17:46

## 2024-12-18 RX ADMIN — HYDROCORTISONE: 1 CREAM TOPICAL at 01:38

## 2024-12-18 RX ADMIN — THERA TABS 1 TABLET: TAB at 10:19

## 2024-12-18 RX ADMIN — ONDANSETRON 4 MG: 2 INJECTION INTRAMUSCULAR; INTRAVENOUS at 10:18

## 2024-12-18 RX ADMIN — MORPHINE SULFATE 8 MG: 10 INJECTION INTRAVENOUS at 04:25

## 2024-12-18 RX ADMIN — HYDROCORTISONE: 1 CREAM TOPICAL at 17:48

## 2024-12-18 ASSESSMENT — ENCOUNTER SYMPTOMS
FOCAL WEAKNESS: 0
DIZZINESS: 0
CARDIOVASCULAR NEGATIVE: 1
SPEECH CHANGE: 0
EYES NEGATIVE: 1
SHORTNESS OF BREATH: 0
CHILLS: 0
SHORTNESS OF BREATH: 1
WEAKNESS: 1
WEIGHT LOSS: 1
NEUROLOGICAL NEGATIVE: 1
VOMITING: 0
NERVOUS/ANXIOUS: 1
NAUSEA: 1
MYALGIAS: 0
FEVER: 0
HEADACHES: 0
ABDOMINAL PAIN: 1
RESPIRATORY NEGATIVE: 1
PALPITATIONS: 0
COUGH: 0
VOMITING: 1
NAUSEA: 0
MUSCULOSKELETAL NEGATIVE: 1
BACK PAIN: 1
SENSORY CHANGE: 0
CONSTITUTIONAL NEGATIVE: 1
MYALGIAS: 1

## 2024-12-18 ASSESSMENT — LIFESTYLE VARIABLES
AUDITORY DISTURBANCES: NOT PRESENT
VISUAL DISTURBANCES: NOT PRESENT
TREMOR: NO TREMOR
PAROXYSMAL SWEATS: NO SWEAT VISIBLE
ANXIETY: MILDLY ANXIOUS
ORIENTATION AND CLOUDING OF SENSORIUM: ORIENTED AND CAN DO SERIAL ADDITIONS
AGITATION: NORMAL ACTIVITY
NAUSEA AND VOMITING: NO NAUSEA AND NO VOMITING
TOTAL SCORE: 2
HEADACHE, FULLNESS IN HEAD: VERY MILD

## 2024-12-18 ASSESSMENT — PAIN DESCRIPTION - PAIN TYPE
TYPE: ACUTE PAIN

## 2024-12-18 NOTE — DISCHARGE PLANNING
Case Management Discharge Planning    Admission Date: 12/17/2024  GMLOS: 2.3  ALOS: 0    6-Clicks ADL Score:    6-Clicks Mobility Score:        Anticipated Discharge Dispo: Discharge Disposition: Discharged to home/self care (01)    DME Needed: No    Action(s) Taken: Chart review completed. RNCM met with patient at bedside to perform assessment and provide resources on ETOH cessation; patient states she quit drinking recently and feels she has adequate support at home to maintain sobriety    Escalations Completed: None    Medically Clear: No    Next Steps: CM to follow up with IDT regarding DCP needs/barriers    Barriers to Discharge: Medical clearance    Is the patient up for discharge tomorrow: No    Care Transition Team Assessment    Case management role discussed; understanding of case management role verbalized   Demographics on facesheet verified  Please see H&P for pertinent PMH and reason for admission  Patient's PCP is: Christian German DO  Patient's preferred pharmacy is: [facility] in [place], [place]  Housing: Patient states she lives in an apartment on the 1st floor with her significant other; no CARMEN  IADLS/ADLS: Independent at baseline  Mental Health Concerns: Anxiety/depression; patient states goes to a family support group; patient declined information on local counselors/therapists in her area  Substance Abuse: Patient states she is recently sober; declined to provide any further details when asked  Monthly Income/employment status: Patient states her significant other takes care of all monthly expenses and declined to provide details; patient states she has no financial concerns and is not currently employed  DME: None at baseline  O2: None at baseline  Prior services: Home-Independent   Discharge support: Significant other  Transportation: Denies needing assistance  Discharge planning: Home with help      Information Source  Orientation Level: Oriented X4  Information Given By: Patient  Who is responsible  for making decisions for patient? : Patient    Readmission Evaluation  Is this a readmission?: No    Elopement Risk  Legal Hold: No  Ambulatory or Self Mobile in Wheelchair: No-Not an Elopement Risk  Elopement Risk: Not at Risk for Elopement    Interdisciplinary Discharge Planning  Does Admitting Nurse Feel This Could be a Complex Discharge?: No  Primary Care Physician: Christian German, DO  Lives with - Patient's Self Care Capacity: Significant Other  Patient or legal guardian wants to designate a caregiver: No  Support Systems: Spouse / Significant Other  Housing / Facility: 1 Story Apartment / Condo  Do You Take your Prescribed Medications Regularly: Yes  Able to Return to Previous ADL's: Future Time w/Therapy  Mobility Issues: No  Prior Services: None, Home-Independent  Assistance Needed: No    Discharge Preparedness  What is your plan after discharge?: Home with help  What are your discharge supports?: Partner  Prior Functional Level: Ambulatory, Drives Self, Independent with Activities of Daily Living, Independent with Medication Management  Difficulity with ADLs: None  Difficulity with IADLs: None    Functional Assesment  Prior Functional Level: Ambulatory, Drives Self, Independent with Activities of Daily Living, Independent with Medication Management    Finances  Financial Barriers to Discharge: No  Prescription Coverage: Yes    Vision / Hearing Impairment  Vision Impairment : No  Hearing Impairment : No    Values / Beliefs / Concerns  Values / Beliefs Concerns : No    Advance Directive  Advance Directive?: None    Domestic Abuse  Have you ever been the victim of abuse or violence?: No  Physical Abuse or Sexual Abuse: No  Verbal Abuse or Emotional Abuse: No  Possible Abuse/Neglect Reported to:: Not Applicable    Psychological Assessment  History of Substance Abuse: Other (comment)  History of Psychiatric Problems: Yes  Non-compliant with Treatment: No  Newly Diagnosed Illness: No    Discharge Risks or  Barriers  Discharge risks or barriers?: Substance abuse, Complex medical needs  Patient risk factors: Cognitive / sensory / physical deficit, Complex medical needs, Multiple organizational systems involved, Substance abuse    Anticipated Discharge Information  Discharge Disposition: Discharged to home/self care (01)

## 2024-12-18 NOTE — ASSESSMENT & PLAN NOTE
Hepatobiliary surgery consulted by ERP, will need to consult GI in the morning.  Reportedly had EUS/ERCP performed, will need to obtain results of path.    12/18/2024  I discussed the case with the gastroenterologist on-call, who did not feel patient required any further workup such as EGD.  Referred to hepatobiliary surgery, Dr. Byers.    I discussed with Dr. Byers of hepatobiliary surgery.  Recommending pain control and cessation of alcohol, which the patient states that she has already done.  Establishment with pain management doctor for outpatient follow-up at the clinic.  Patient may be a candidate for [place] procedure to relieve patient's chronic pancreatitis.  Need cessation of alcohol for 6 months prior to undergoing procedure.    12/19/2024  Plan for outpatient pancreatic stent with Dr. Dawson    12/20/2024  Prolonged discussion with the patient and the  regarding outpatient planned procedures.

## 2024-12-18 NOTE — ED NOTES
Rounded on patient, resting with calm unlabored breathing and eyes closed. No further needs at this time.

## 2024-12-18 NOTE — ED NOTES
Rounded on patient, resting with calm unlabored breathing and eyes closed and boyfriend at bedside. Denies any pain or other needs at this time.

## 2024-12-18 NOTE — ASSESSMENT & PLAN NOTE
12/18/2024  Start MiraLAX and docusate twice daily.    12/19/2024  No bowel movement yet.  Continue MiraLAX and docusate twice daily.

## 2024-12-18 NOTE — CARE PLAN
The patient is Stable - Low risk of patient condition declining or worsening    Shift Goals  Clinical Goals: pain control  Patient Goals: pain control  Family Goals: joe    Progress made toward(s) clinical / shift goals:    Problem: Pain - Standard  Goal: Alleviation of pain or a reduction in pain to the patient’s comfort goal  Description: Target End Date:  Prior to discharge or change in level of care  Document on Vitals flowsheet  1.  Document pain using the appropriate pain scale per order or unit policy  2.  Educate and implement non-pharmacologic comfort measures (i.e. relaxation, distraction, massage, cold/heat therapy, etc.)  3.  Pain management medications as ordered  4.  Reassess pain after pain med administration per policy  5.  If opiods administered assess patient's response to pain medication is appropriate per POSS sedation scale  6.  Follow pain management plan developed in collaboration with patient and interdisciplinary team (including palliative care or pain specialists if applicable)  Outcome: Progressing     Problem: Knowledge Deficit - Standard  Goal: Patient and family/care givers will demonstrate understanding of plan of care, disease process/condition, diagnostic tests and medications  Description: Target End Date:  1-3 days or as soon as patient condition allows  Document in Patient Education  1.  Patient and family/caregiver oriented to unit, equipment, visitation policy and means for communicating concern  2.  Complete/review Learning Assessment  3.  Assess knowledge level of disease process/condition, treatment plan, diagnostic tests and medications  4.  Explain disease process/condition, treatment plan, diagnostic tests and medications  Outcome: Progressing       Patient is not progressing towards the following goals:

## 2024-12-18 NOTE — PROGRESS NOTES
[facility] Medicine Daily Progress Note    Date of Service  12/18/2024    Chief Complaint  Brenda Cartagena is a 39 y.o. female admitted 12/17/2024 with intractable and recurrent abdominal pain.    Hospital Course  Brenda Cartagena is a 39 y.o. female with cystic pancreatic mass concerning for malignancy, ETOH dependence in early remission, anxiety, who presented 12/17/2024 with intractable abdominal pain, pancreatic mass.     Patient recently admitted from 11/10 to 11/13/2024 with concerns of pancreatic mass.  She underwent EGD for concerns of GI bleeding found to have small gastric erosions and gastritis, grade a esophagitis, discharged on twice daily PPI and to avoid all NSAIDs as well as close outpatient GI follow-up for EUS/ERCP.  She has had EUS and ERCP performed outpatient, presented today to [place] for intractable abdominal pain, nausea vomiting, progressively worse over the past 2 weeks, now severe.  She has been experiencing fatigue and malaise, inability to tolerate p.o. intake, occasional dyspnea with exertion. No fevers or chills headache or vision changes chest pain cough no dysuria.  Presented to [facility] for above symptoms and after discussion with ERP and hepatobiliary surgery team plan was to transfer patient to [facility] for further evaluation.     In [facility] ED she is afebrile pulse from 80s to 107, normal respiratory rate systolic blood pressure up to 130s pulse ox 93 to 100% on room air.  No leukocytosis stable anemia normal platelet count although I suspect she is hemoconcentrated CMP bicarb 18 glucose 133 lipase within normal limits as is renal and liver function.  ERP discussed with Dr. Byers from hepatobiliary surgery team, will consult on patient and they will see her in the morning.  Requested CT with pancreas protocol as well as requested GI consultation in the morning.  Subsequently for to hospitalist for admission.       Interval Problem Update  12/18/2024  Patient was seen and  examined on the neurology floor on 2 separate occasions.  Continues to complain of severe abdominal pain requiring IV Dilaudid and IV morphine.    I discussed the case with the gastroenterologist on-call, who did not feel patient required any further workup such as EGD.  Patient was at [facility] a couple of weeks ago.  Patient was recently evaluated by Dr. Dawson of [facility], unable to remove pancreatic stone.  Referred to hepatobiliary surgery, Dr. Byers.    I discussed with Dr. Byers of hepatobiliary surgery.  Recommending pain control and cessation of alcohol, which the patient states that she has already done.  Establishment with pain management doctor for outpatient follow-up at the clinic.  Patient may be a candidate for Puestow procedure to relieve patient's chronic pancreatitis.  Need cessation of alcohol for 6 months prior to undergoing procedure.    Discussed with patient about starting methadone versus buprenorphine and she is agreeable to starting buprenorphine with duloxetine to increase pain tolerance.  I have ordered oxycodone as well as IV Dilaudid as needed for breakthrough pain.      I have discussed this patient's plan of care and discharge plan at IDT rounds today with Case Management, Nursing, Nursing leadership, and other members of the IDT team.    Consultants/Specialty  Hepatobiliary surgery    Code Status  Full Code    Disposition  The patient is not medically cleared for discharge to home or a post-acute facility.  Anticipate discharge to: home with close outpatient follow-up    I have placed the appropriate orders for post-discharge needs.    Review of Systems  Review of Systems   Constitutional:  Negative for chills and fever.   Respiratory:  Negative for cough and shortness of breath.    Cardiovascular:  Negative for chest pain and palpitations.   Gastrointestinal:  Positive for abdominal pain. Negative for nausea and vomiting.   Musculoskeletal:  Negative for joint pain  and myalgias.   Neurological:  Negative for dizziness and headaches.        Physical Exam  Temp:  [36.4 °C (97.5 °F)-36.6 °C (97.9 °F)] 36.6 °C (97.9 °F)  Pulse:  [] 81  Resp:  [16-20] 17  BP: ()/(58-93) 108/76  SpO2:  [93 %-100 %] 99 %    Physical Exam  Vitals and nursing note reviewed.   Constitutional:       Appearance: She is normal weight. She is ill-appearing. She is not diaphoretic.   HENT:      Head: Normocephalic and atraumatic.      Mouth/Throat:      Mouth: Mucous membranes are moist.      Pharynx: Oropharynx is clear. No oropharyngeal exudate.   Eyes:      General:         Right eye: No discharge.         Left eye: No discharge.      Conjunctiva/sclera: Conjunctivae normal.      Pupils: Pupils are equal, round, and reactive to light.   Cardiovascular:      Rate and Rhythm: Normal rate and regular rhythm.      Pulses: Normal pulses.      Heart sounds: Normal heart sounds. No murmur heard.  Pulmonary:      Effort: Pulmonary effort is normal. No respiratory distress.      Breath sounds: Normal breath sounds.   Abdominal:      General: Abdomen is flat. Bowel sounds are normal. There is no distension.      Palpations: Abdomen is soft.      Tenderness: There is abdominal tenderness. There is no guarding or rebound.   Musculoskeletal:      Cervical back: Neck supple. No tenderness.      Right lower leg: No edema.      Left lower leg: No edema.   Skin:     Coloration: Skin is pale.   Neurological:      Mental Status: She is alert and oriented to person, place, and time.      Motor: No weakness.   Psychiatric:         Attention and Perception: Attention normal.         Mood and Affect: Mood normal.         Fluids    Intake/Output Summary (Last 24 hours) at 12/18/2024 1542  Last data filed at 12/17/2024 2023  Gross per 24 hour   Intake 1000 ml   Output --   Net 1000 ml        Laboratory  Recent Labs     12/17/24 2036   WBC 5.9   RBC 3.20*   HEMOGLOBIN 9.7*   HEMATOCRIT 28.7*   MCV 89.7   MCH 30.3    MCHC 33.8   RDW 48.7   PLATELETCT 201   MPV 10.9     Recent Labs     12/17/24 2036   SODIUM 137   POTASSIUM 3.6   CHLORIDE 105   CO2 18*   GLUCOSE 133*   BUN 9   CREATININE 0.28*   CALCIUM 8.5                   Imaging  CT-PANCREAS AND ABDOMEN WITH & W/O   Final Result         1.  Heterogeneous lesion in the pancreatic head with dilatation the pancreatic duct concerning for pancreatic mass lesion, recommend follow-up MRI pancreas for further characterization.   2.  Multiple intermediate density soft tissue masses along the greater curvature of the stomach, appearance most compatible with underlying neoplastic disease, component of hemorrhage is not excluded given density.   3.  Hepatomegaly           Assessment/Plan  * Cystic mass of pancreas- (present on admission)  Assessment & Plan  Hepatobiliary surgery consulted by ERP, will need to consult GI in the morning.  Reportedly had EUS/ERCP performed, will need to obtain results of path.    12/18/2024  I discussed the case with the gastroenterologist on-call, who did not feel patient required any further workup such as EGD.  Referred to hepatobiliary surgery, Dr. Byers.    I discussed with Dr. Byers of hepatobiliary surgery.  Recommending pain control and cessation of alcohol, which the patient states that she has already done.  Establishment with pain management doctor for outpatient follow-up at the clinic.  Patient may be a candidate for Puestow procedure to relieve patient's chronic pancreatitis.  Need cessation of alcohol for 6 months prior to undergoing procedure.    IPMN (intraductal papillary mucinous neoplasm)- (present on admission)  Assessment & Plan  12/18/2024  Concern for this as per discussion with Dr. Byers of hepatobiliary surgery.  Outpatient follow-up at her clinic.    Alcohol-induced chronic pancreatitis (HCC)- (present on admission)  Assessment & Plan  12/18/2024  Due to alcohol.  Patient may be a candidate for Puestow procedure to  relieve patient's chronic pancreatitis.  Need cessation of alcohol for 6 months prior to undergoing procedure.    Plan for outpatient follow-up with hepatobiliary surgery.    Intractable abdominal pain- (present on admission)  Assessment & Plan  Analgesia as needed, adjust regimen.  Antiemetics as needed    12/18/2024  Discussed with patient about starting methadone versus buprenorphine and she is agreeable to starting buprenorphine with duloxetine to increase pain tolerance.  I have ordered oxycodone as well as IV Dilaudid as needed for breakthrough pain.  Outpatient referral for pain management provider    Continuous pulse oximetry monitoring to monitor for hypoxia and respiratory depression while receiving IV narcotic medications.        Constipation due to opioid therapy- (present on admission)  Assessment & Plan  12/18/2024  Start MiraLAX and docusate twice daily.    Pancreatic duct stones- (present on admission)  Assessment & Plan  12/18/2024  Patient was at [facility] a couple of weeks ago.  Patient was recently evaluated by Dr. Dawson of [facility], unable to remove pancreatic stone.  Referred to hepatobiliary surgery, Dr. Byers.    Pain management with outpatient follow-up for consideration of surgery if remaining sober.    Eczema  Assessment & Plan  Hydrocortisone cream    Anxiety  Assessment & Plan  Ativan prn for anxiety    Gastric lesion  Assessment & Plan  Noted on admission CT, multiple intermediate density soft tissue masses along the greater curvature of the stomach, appearance most compatible with underlying neoplastic disease, component of hemorrhage is not excluded given density.    GI consult in a.m., EGD last month for concerns of GI bleeding showed small gastric erosions and gastritis, grade a esophagitis.  Patient reports intermittent black stools for the past couple of weeks.  She assumed it was something she was eating.  Hemoglobin on admission 9.7 although she is quite dehydrated,  I suspect it is lower, twice daily PPI ordered, monitor for signs of bleeding, transfuse hemoglobin less than 7.  Given recent EGD as well as CT abdomen pelvis with and without contrast in June of this year that did not note these masses I suspect they are more likely from hemorrhage however will need to discuss with hepatobiliary and GI team.    12/18/2024  No further EGD indicated at this time per discussion with gastroenterologist on-call.  Continue omeprazole 20 mg by mouth twice daily.    Anemia- (present on admission)  Assessment & Plan  Patient reports intermittent black stool, suspect slow GI bleed, recent EGD showed small gastric erosions and gastritis, grade a esophagitis.  Continue PPI twice daily.  GI consult in a.m.    12/18/2024  Suspect due to chronic blood loss.  In setting of esophagitis alcohol related.  Work-up with ferritin, iron panel, reticulocyte panel, B12, folate, LDH, TSH     Per discussion with gastroenterologist on-call, deferring to hepatobiliary surgery.  Hemoglobin remaining stable although patient is intermittently reporting melena.  Continue omeprazole 20 mg twice daily.    Uncomplicated alcohol dependence (HCC)- (present on admission)  Assessment & Plan  Per patient in early remission, monitor for signs of withdrawal, none on my evaluation.         VTE prophylaxis:    enoxaparin ppx      I have performed a physical exam and reviewed and updated ROS and Plan today (12/18/2024). In review of yesterday's note (12/17/2024), there are no changes except as documented above.

## 2024-12-18 NOTE — ASSESSMENT & PLAN NOTE
12/18/2024  Patient was at [facility] a couple of weeks ago.  Patient was recently evaluated by Dr. Dawson of [facility], unable to remove pancreatic stone.  Referred to hepatobiliary surgery, Dr. Byers.    Pain management with outpatient follow-up for consideration of surgery if remaining sober.

## 2024-12-18 NOTE — ASSESSMENT & PLAN NOTE
Noted on admission CT, multiple intermediate density soft tissue masses along the greater curvature of the stomach, appearance most compatible with underlying neoplastic disease, component of hemorrhage is not excluded given density.    GI consult in a.m., EGD last month for concerns of GI bleeding showed small gastric erosions and gastritis, grade a esophagitis.  Patient reports intermittent black stools for the past couple of weeks.  She assumed it was something she was eating.  Hemoglobin on admission 9.7 although she is quite dehydrated, I suspect it is lower, twice daily PPI ordered, monitor for signs of bleeding, transfuse hemoglobin less than 7.  Given recent EGD as well as CT abdomen pelvis with and without contrast in June of this year that did not note these masses I suspect they are more likely from hemorrhage however will need to discuss with hepatobiliary and GI team.    12/18/2024  No further EGD indicated at this time per discussion with gastroenterologist on-call.  Continue omeprazole 20 mg by mouth twice daily.

## 2024-12-18 NOTE — H&P
Hospital Medicine History & Physical Note    Date of Service  12/17/2024    Primary Care Physician  Christian German D.O.    Consultants  ERP discussed with hepatobiliary surgeon Dr. Byers     Code Status  Full Code    Chief Complaint  Chief Complaint   Patient presents with    Sent by MD    Abdominal Pain     Patient BIBA from [facility] for a pancreatic duct stone. Patient endorses pain x2 weeks, N/V.        History of Presenting Illness  Brenda Cartagena is a 39 y.o. female with cystic pancreatic mass concerning for malignancy, ETOH dependence in early remission, anxiety, who presented 12/17/2024 with intractable abdominal pain, pancreatic mass.    Patient recently admitted from 11/10 to 11/13/2024 with concerns of pancreatic mass.  She underwent EGD for concerns of GI bleeding found to have small gastric erosions and gastritis, grade a esophagitis, discharged on twice daily PPI and to avoid all NSAIDs as well as close outpatient GI follow-up for EUS/ERCP.  She has had EUS and ERCP performed outpatient, presented today to [place] for intractable abdominal pain, nausea vomiting, progressively worse over the past 2 weeks, now severe.  She has been experiencing fatigue and malaise, inability to tolerate p.o. intake, occasional dyspnea with exertion. No fevers or chills headache or vision changes chest pain cough no dysuria.  Presented to medical for above symptoms and after discussion with ERP and hepatobiliary surgery team plan was to transfer patient to [facility] for further evaluation.    In [facility] ED she is afebrile pulse from 80s to 107, normal respiratory rate systolic blood pressure up to 130s pulse ox 93 to 100% on room air.  No leukocytosis stable anemia normal platelet count although I suspect she is hemoconcentrated CMP bicarb 18 glucose 133 lipase within normal limits as is renal and liver function.  ERP discussed with Dr. Byers from hepatobiliary surgery team, will consult on patient and they  will see her in the morning.  Requested CT with pancreas protocol as well as requested GI consultation in the morning.  Subsequently for to hospitalist for admission.    I discussed the plan of care with patient, bedside RN, charge RN, and .    Review of Systems  Review of Systems   Constitutional:  Positive for malaise/fatigue and weight loss.   Respiratory:  Positive for shortness of breath.    Gastrointestinal:  Positive for abdominal pain, melena, nausea and vomiting.   Musculoskeletal:  Positive for back pain and myalgias.   Neurological:  Positive for weakness. Negative for sensory change, speech change and focal weakness.   Psychiatric/Behavioral:  The patient is nervous/anxious.        Past Medical History   has a past medical history of Indigestion (13 yrears).    Surgical History   has a past surgical history that includes incision and drainage orthopedic (5/9/2012); external fixator application (5/9/2012); tibia nailing intramedullary (5/11/2012); external fixator removal (5/11/2012); fasciotomy (5/13/2012); wound closure delayed (5/16/2012); irrigation & debridement ortho (5/18/2012); wound closure ortho (5/18/2012); orif, fracture, tibia (9/5/2012); tonsillectomy (Bilateral, 7/21/2015); and pr upper gi endoscopy,diagnosis (N/A, 11/12/2024).     Family History  family history is not on file.   Family history reviewed with patient. There is no family history that is pertinent to the chief complaint.     Social History   reports that she has been smoking cigarettes. She has a 1 pack-year smoking history. She does not have any smokeless tobacco history on file. She reports current alcohol use. She reports current drug use. Drug: Inhaled.    Allergies  Allergies   Allergen Reactions    Peanut-Derived Swelling     Tongue swelling    Tree Nuts Food Allergy Swelling     All nuts        Medications  Prior to Admission Medications   Prescriptions Last Dose Informant Patient Reported? Taking?    omeprazole (PRILOSEC) 20 MG delayed-release capsule 12/17/2024 Evening Patient Yes Yes   Sig: Take 20 mg by mouth every day.   oxyCODONE immediate release (ROXICODONE) 10 MG immediate release tablet 12/16/2024 Evening Patient Yes Yes   Sig: Take 10 mg by mouth every 6 hours as needed for Moderate Pain. 5 day supply   oxyCODONE immediate-release (ROXICODONE) 5 MG Tab 12/5/2024 Historical Yes Yes   Sig: Take 5 mg by mouth every 6 hours as needed for Severe Pain. 5 days supply completed      Facility-Administered Medications: None       Physical Exam  Temp:  [36.4 °C (97.6 °F)] 36.4 °C (97.6 °F)  Pulse:  [] 88  Resp:  [16-17] 16  BP: ()/(58-93) 99/58  SpO2:  [93 %-100 %] 95 %  Blood Pressure: 99/58   Temperature: 36.4 °C (97.6 °F)   Pulse: 88   Respiration: 16   Pulse Oximetry: 95 %       Physical Exam  Vitals and nursing note reviewed.   Constitutional:       General: She is in acute distress (2/2 abdominal pain).      Appearance: She is ill-appearing.   HENT:      Head: Normocephalic and atraumatic.      Nose: Nose normal. No rhinorrhea.      Mouth/Throat:      Mouth: Mucous membranes are dry.      Pharynx: Oropharynx is clear.   Eyes:      General: No scleral icterus.     Extraocular Movements: Extraocular movements intact.   Cardiovascular:      Rate and Rhythm: Normal rate and regular rhythm.      Pulses: Normal pulses.   Pulmonary:      Effort: No respiratory distress.      Breath sounds: No wheezing, rhonchi or rales.   Abdominal:      Palpations: Abdomen is soft.      Tenderness: There is abdominal tenderness. There is no guarding or rebound.   Musculoskeletal:         General: Normal range of motion.      Cervical back: Normal range of motion and neck supple.   Skin:     General: Skin is warm and dry.      Capillary Refill: Capillary refill takes less than 2 seconds.   Neurological:      General: No focal deficit present.      Mental Status: She is alert and oriented to person, place, and time.  "Mental status is at baseline.      Cranial Nerves: No cranial nerve deficit.      Sensory: No sensory deficit.      Motor: Weakness (generalized, can move all 4 antigravity) present.      Coordination: Coordination normal.   Psychiatric:         Attention and Perception: Attention and perception normal.         Mood and Affect: Mood is anxious.         Speech: Speech normal.         Behavior: Behavior normal.         Thought Content: Thought content normal.         Laboratory:  Recent Labs     12/17/24 2036   WBC 5.9   RBC 3.20*   HEMOGLOBIN 9.7*   HEMATOCRIT 28.7*   MCV 89.7   MCH 30.3   MCHC 33.8   RDW 48.7   PLATELETCT 201   MPV 10.9     Recent Labs     12/17/24 2036   SODIUM 137   POTASSIUM 3.6   CHLORIDE 105   CO2 18*   GLUCOSE 133*   BUN 9   CREATININE 0.28*   CALCIUM 8.5     Recent Labs     12/17/24 2036   ALTSGPT 17   ASTSGOT 30   ALKPHOSPHAT 70   TBILIRUBIN 0.6   LIPASE 55   GLUCOSE 133*         No results for input(s): \"NTPROBNP\" in the last 72 hours.      No results for input(s): \"TROPONINT\" in the last 72 hours.    Imaging:  CT-PANCREAS AND ABDOMEN WITH & W/O   Final Result         1.  Heterogeneous lesion in the pancreatic head with dilatation the pancreatic duct concerning for pancreatic mass lesion, recommend follow-up MRI pancreas for further characterization.   2.  Multiple intermediate density soft tissue masses along the greater curvature of the stomach, appearance most compatible with underlying neoplastic disease, component of hemorrhage is not excluded given density.   3.  Hepatomegaly          no X-Ray or EKG requiring interpretation    Assessment/Plan:  Justification for Admission Status  I anticipate this patient will require at least two midnights for appropriate medical management, necessitating inpatient admission because pancreatic mass, intractable abdominal pain,      * Cystic mass of pancreas- (present on admission)  Assessment & Plan  Hepatobiliary surgery consulted by ERP, will " need to consult GI in the morning.  Reportedly had EUS/ERCP performed, will need to obtain results of path.    Eczema  Assessment & Plan  Hydrocortisone cream    Anxiety  Assessment & Plan  Ativan prn for anxiety    Gastric lesion  Assessment & Plan  Noted on admission CT, multiple intermediate density soft tissue masses along the greater curvature of the stomach, appearance most compatible with underlying neoplastic disease, component of hemorrhage is not excluded given density.    GI consult in a.m., EGD last month for concerns of GI bleeding showed small gastric erosions and gastritis, grade a esophagitis.  Patient reports intermittent black stools for the past couple of weeks.  She assumed it was something she was eating.  Hemoglobin on admission 9.7 although she is quite dehydrated, I suspect it is lower, twice daily PPI ordered, monitor for signs of bleeding, transfuse hemoglobin less than 7.  Given recent EGD as well as CT abdomen pelvis with and without contrast in June of this year that did not note these masses I suspect they are more likely from hemorrhage however will need to discuss with hepatobiliary and GI team.    Anemia- (present on admission)  Assessment & Plan  Patient reports intermittent black stool, suspect slow GI bleed, recent EGD showed small gastric erosions and gastritis, grade a esophagitis.  Continue PPI twice daily.  GI consult in a.m.    Intractable abdominal pain- (present on admission)  Assessment & Plan  Analgesia as needed, adjust regimen.  Antiemetics as needed      Uncomplicated alcohol dependence (HCC)- (present on admission)  Assessment & Plan  Per patient in early remission, monitor for signs of withdrawal, none on my evaluation.        VTE prophylaxis: SCDs/TEDs  Total time spent on admission 77 minutes.    This included my review with ER physician, review of ED events, patient's history, outside records, recent records, face to face interview, physical examination; my review  of lab results (CBC, chemistry panel), imaging review (CXR) and ECG. Also includes counseling patient on admission, treatment plan, and documentation of encounter.  In addition, speaking with specialist Dr. James

## 2024-12-18 NOTE — HOSPITAL COURSE
Brenda Cartagena is a 39 y.o. female with cystic pancreatic mass concerning for malignancy, ETOH dependence in early remission, anxiety, who presented 12/17/2024 with intractable abdominal pain, pancreatic mass.     Patient recently admitted from 11/10 to 11/13/2024 with concerns of pancreatic mass.  She underwent EGD for concerns of GI bleeding found to have small gastric erosions and gastritis, grade a esophagitis, discharged on twice daily PPI and to avoid all NSAIDs as well as close outpatient GI follow-up for EUS/ERCP.  She has had EUS and ERCP performed outpatient, presented today to [facility] for intractable abdominal pain, nausea vomiting, progressively worse over the past 2 weeks, now severe.  She has been experiencing fatigue and malaise, inability to tolerate p.o. intake, occasional dyspnea with exertion. No fevers or chills headache or vision changes chest pain cough no dysuria.  Presented to medical for above symptoms and after discussion with ERP and hepatobiliary surgery team plan was to transfer patient to [facility] for further evaluation.     In [facility] ED she is afebrile pulse from 80s to 107, normal respiratory rate systolic blood pressure up to 130s pulse ox 93 to 100% on room air.  No leukocytosis stable anemia normal platelet count although I suspect she is hemoconcentrated CMP bicarb 18 glucose 133 lipase within normal limits as is renal and liver function.  ERP discussed with Dr. Byers from hepatobiliary surgery team, will consult on patient and they will see her in the morning.  Requested CT with pancreas protocol as well as requested GI consultation in the morning.  Subsequently for to hospitalist for admission.

## 2024-12-18 NOTE — ED NOTES
Rounded on patient, resting with calm unlabored breathing and eyes closed with boyfriend at bedside. Denies any pain or other needs at this time.

## 2024-12-18 NOTE — PROGRESS NOTES
4 Eyes Skin Assessment Completed by CARLINE Evans and CARLINE Lancaster.    Head WDL  Ears Redness and Blanching  Nose WDL  Mouth discolored teeth   Neck WDL  Breast/Chest Redness and Blanching  Shoulder Blades acne  Spine acne  (R) Arm/Elbow/Hand Redness and Blanching  (L) Arm/Elbow/Hand Redness and Blanching  Abdomen WDL  Groin WDL  Scrotum/Coccyx/Buttocks Redness and Blanching  (R) Leg surgical scarring from the knee to the ankle for a trina  (L) Leg Redness and Blanching  (R) Heel/Foot/Toe Redness and Blanching dry heel   (L) Heel/Foot/Toe Redness and Blanching dry heel left ankle abrasion from boots          Devices In Places Tele Box, Blood Pressure Cuff, Pulse Ox, and SCD's      Interventions In Place Pillows    Possible Skin Injury No    Pictures Uploaded Into Epic N/A  Wound Consult Placed N/A  RN Wound Prevention Protocol Ordered No

## 2024-12-18 NOTE — ASSESSMENT & PLAN NOTE
12/18/2024  Concern for this as per discussion with Dr. Byers of hepatobiliary surgery.  Outpatient follow-up at her clinic.

## 2024-12-18 NOTE — ED NOTES
Med Rec completed per patient and historical records     Allergies reviewed: yes    Antibiotics in the past 30 days: no    Anticoagulant in past 14 days: no    Pharmacy patient utilizes: [facility]  [phone]    Patient picked up Oxycodone 5 mg on 11/30/2024 for 5 days supply     Oxycodone 10 mg was picked up for a 5 day supply on 12/13/2024 patient states her last dose was on 12/16/2023 because she hasn't been able to keep anything down.

## 2024-12-18 NOTE — ASSESSMENT & PLAN NOTE
Patient reports intermittent black stool, suspect slow GI bleed, recent EGD showed small gastric erosions and gastritis, grade a esophagitis.  Continue PPI twice daily.  GI consult in a.m.    12/18/2024  Suspect due to chronic blood loss.  In setting of esophagitis alcohol related.  Work-up with ferritin, iron panel, reticulocyte panel, B12, folate, LDH, TSH     Per discussion with gastroenterologist on-call, deferring to hepatobiliary surgery.  Hemoglobin remaining stable although patient is intermittently reporting melena.  Continue omeprazole 20 mg twice daily.

## 2024-12-18 NOTE — ED NOTES
Patient assessment performed, patient requesting something for pain and nausea. Chart up for ERP to see.

## 2024-12-18 NOTE — ASSESSMENT & PLAN NOTE
Analgesia as needed, adjust regimen.  Antiemetics as needed    12/18/2024  Discussed with patient about starting methadone versus buprenorphine and she is agreeable to starting buprenorphine with duloxetine to increase pain tolerance.  I have ordered oxycodone as well as IV Dilaudid as needed for breakthrough pain.  Outpatient referral for pain management provider  Continuous pulse oximetry monitoring to monitor for hypoxia and respiratory depression while receiving IV narcotic medications.      12/19/2024  Patient is willing to try buprenorphine again for pain control.  Made her feel funny last night.  Continue oxycodone.  Continue to require IV Dilaudid for breakthrough pain.  Continuous pulse oximetry monitoring to monitor for hypoxia and respiratory depression while receiving IV narcotic medications.    12/20/2024  Patient appears to be having withdrawal symptoms with buprenorphine.  She is going to try methadone for pain control.  She has required IV Dilaudid twice today for breakthrough pain.    Start methadone 5 mg every 8 hours.  Monitor QTc prolongation.  Continuous cardiac monitoring.  Continue IV Dilaudid as needed for breakthrough pain.  Continuous pulse oximetry monitoring to monitor for hypoxia and respiratory depression while receiving IV narcotic medications.    12/21/2024  Methadone titrated up to 15 mg every 8 hours.  Continues to require IV Dilaudid.  Duloxetine increased to 60 mg daily.  Continue IV Dilaudid as needed for breakthrough pain.  Continuous pulse oximetry monitoring to monitor for hypoxia and respiratory depression while receiving IV narcotic medications.    12/22/2024  Continuing to require oxycodone and IV dilaudid for breakthrough pain.  Discontinuing mirtazepine.  Continue duloxetine 60 mg daily.  Continuous pulse oximetry monitoring to monitor for hypoxia and respiratory depression while receiving IV narcotic medications.

## 2024-12-18 NOTE — ED NOTES
Rounded on patient, resting with calm unlabored breathing and watching TV. No further needs at this time.

## 2024-12-18 NOTE — CONSULTS
Surgical Oncology Consult History and Physical    Reason for Consult: Pancreatic duct stone    Consulted By: César Chaparro  Location: [place] [address]    HPI: Patient is a 39-year-old woman with a history of alcohol use disorder and multiple episodes of pancreatitis.  She has required previous admission for pancreatitis and pseudocyst formation.  She has also developed a pancreatic duct stone.  MRCP in November confirmed pancreatic ductal dilation with stone and debris, cystic lesion with internal debris and/or stones in the distal pancreatic body, IPMN favored.  She recently underwent EUS with Dr. Samir chaney in an attempt to clear her pancreatic duct.  EUS demonstrated a stone lodged in the genu of the pancreas and unable to be removed endoscopically.  Since then, patient has continued to have abdominal pain that radiates to the back.  Through much of this time, she was still drinking alcohol and using methamphetamine.  She reports she is recently stopped.  She does endorse drug use twice within the last month.  Patient lives in [place] and has frequently needed to present to the ED therefore appropriate pain control.  At this time she is not established with good pain control doctor.  I was contacted by the [place] ED last night for assessment of this patient given that she has not been able to establish with hepatobiliary surgery yet.  She was then transferred to [facility] for further evaluation.    In speaking with the patient this morning, she still endorses feeling poorly.  She endorses a history above.  She states she is currently having abdominal pain that is radiating to the back.  She has a diminished appetite.      Body mass index is 21.79 kg/m².    ECO    Past Medical History:          Past Medical History:   Diagnosis Date    Indigestion 13 yrears    heart burn       Past Surgical History:        Past Surgical History:   Procedure Laterality Date    HI UPPER GI ENDOSCOPY,DIAGNOSIS N/A 2024     Procedure: GASTROSCOPY;  Surgeon: Umesh Gardner D.O.;  Location: SUR[facility]ERY [facility];  Service: Gastroenterology    TONSILLECTOMY Bilateral 7/21/2015    Procedure: TONSILLECTOMY;  Surgeon: Jaclyn North M.D.;  Location: SURGERY [facility];  Service:     ORIF, FRACTURE, TIBIA  9/5/2012    Performed by BOBO BRITT at SURGERY [facility]    IRRIGATION & DEBRIDEMENT ORTHO  5/18/2012    Performed by BOBO BRITT at SURGERY [facility]    WOUND CLOSURE ORTHO  5/18/2012    Performed by BOBO BRITT at SURGERY [facility]    WOUND CLOSURE DELAYED  5/16/2012    Performed by BOBO BRITT at SURGERY [facility]    FASCIOTOMY  5/13/2012    Performed by ANN LEBRON at [facility]    TIBIA NAILING INTRAMEDULLARY  5/11/2012    Performed by BOBO BRITT at [facility]    EXTERNAL FIXATOR REMOVAL  5/11/2012    Performed by BOBO BRITT at SURGERY [facility]    INCISION AND DRAINAGE ORTHOPEDIC  5/9/2012    Performed by TRICIA SOLIZ at [facility]    EXTERNAL FIXATOR APPLICATION  5/9/2012    Performed by TRICIA SOLIZ at SURGERY [facility]       Current Medications:            -Anticoagulation:  none   Last dose:  n/a    Allergies:         Allergies   Allergen Reactions    Peanut-Derived Swelling     Tongue swelling    Tree Nuts Food Allergy Swelling     All nuts        Family History:        No family history on file.    Social History:           Social History     Socioeconomic History    Marital status:      Spouse name: Not on file    Number of children: Not on file    Years of education: Not on file    Highest education level: Not on file   Occupational History    Not on file   Tobacco Use    Smoking status: Every Day     Current packs/day: 0.50     Average packs/day: 0.5 packs/day for 2.0 years (1.0 ttl pk-yrs)     Types: Cigarettes    Smokeless tobacco: Not on file   Substance and Sexual Activity    Alcohol  "use: Yes     Comment: every day 2-3 glasses of an alcholic beverage ,Vodka    Drug use: Yes     Types: Inhaled     Comment: 2007 to 2008 Meth. used Meth last Feb 10 for one day as stated by pt    Sexual activity: Not on file   Other Topics Concern    Not on file   Social History Narrative    Not on file     Social Drivers of Health     Financial Resource Strain: Not on file   Food Insecurity: No Food Insecurity (11/10/2024)    Hunger Vital Sign     Worried About Running Out of Food in the Last Year: Never true     Ran Out of Food in the Last Year: Never true   Transportation Needs: No Transportation Needs (11/10/2024)    PRAPARE - Transportation     Lack of Transportation (Medical): No     Lack of Transportation (Non-Medical): No   Physical Activity: Not on file   Stress: Not on file   Social Connections: Not on file   Intimate Partner Violence: Not At Risk (11/10/2024)    Humiliation, Afraid, Rape, and Kick questionnaire     Fear of Current or Ex-Partner: No     Emotionally Abused: No     Physically Abused: No     Sexually Abused: No   Housing Stability: Low Risk  (11/10/2024)    Housing Stability Vital Sign     Unable to Pay for Housing in the Last Year: No     Number of Times Moved in the Last Year: 0     Homeless in the Last Year: No       Review of Systems:  Review of Systems   Constitutional:  Positive for malaise/fatigue and weight loss.   Gastrointestinal:  Positive for abdominal pain and nausea.   All other systems reviewed and are negative.      All other ROS negative.      Physical Exam:  /76   Pulse 81   Temp 36.6 °C (97.9 °F) (Temporal)   Resp 17   Ht 1.6 m (5' 3\")   Wt 55.8 kg (123 lb)   SpO2 99%   BMI 21.79 kg/m²       -General: Patient is cooperative, appears stated age, in no acute distress, AAOx3     -HEENT:  Head is atraumatic, neck supple, no scleral icterus     -Neck: trachea is midline     -Respiratory: Lungs clear to auscultation bilaterally, no increased work of breathing, stable " on room air       -Cardiovascular: normal rate, regular, no murmur appreciated     -Abdomen: soft, non-tender to palpation,  no scars appreciated     -: Deferred     -MSK/Extremities: atraumatic, normal range of motion and strength, ambulatory     -Integumentary: warm, dry, no obvious skin lesions or rashes appreciated     -Neurologic: alert, speech clear and coherent, functional cognition intact     -Psychiatric:  cooperative, appropriate mood and affect        Imaging:   CT A/P:   IMPRESSION:        1.  Heterogeneous lesion in the pancreatic head with dilatation the pancreatic duct concerning for pancreatic mass lesion, recommend follow-up MRI pancreas for further characterization.  2.  Multiple intermediate density soft tissue masses along the greater curvature of the stomach, appearance most compatible with underlying neoplastic disease, component of hemorrhage is not excluded given density.  3.  Hepatomegaly    MRCP:   IMPRESSION:     1.  Pancreatic ductal dilation with stones and debris present within the pancreatic duct in the body and head.  2.  Cystic lesion with internal debris and/or stones in the distal pancreatic body measuring 15 mm with associated contour deformity of the pancreas.  IPMN favored although exact etiology is uncertain.  3.  No adenopathy demonstrated.  4.  Normal caliber common bile duct without stone.      EUS:            Labs:   Lab Results   Component Value Date/Time    SODIUM 137 12/17/2024 08:36 PM    POTASSIUM 3.6 12/17/2024 08:36 PM    CHLORIDE 105 12/17/2024 08:36 PM    CO2 18 (L) 12/17/2024 08:36 PM    GLUCOSE 133 (H) 12/17/2024 08:36 PM    BUN 9 12/17/2024 08:36 PM    CREATININE 0.28 (L) 12/17/2024 08:36 PM      Recent Labs     12/17/24 2036   ASTSGOT 30   ALTSGPT 17   TBILIRUBIN 0.6   GLOBULIN 3.3       Lab Results   Component Value Date/Time    WBC 5.9 12/17/2024 08:36 PM    RBC 3.20 (L) 12/17/2024 08:36 PM    HEMOGLOBIN 9.7 (L) 12/17/2024 08:36 PM    HEMATOCRIT 28.7 (L)  12/17/2024 08:36 PM    MCV 89.7 12/17/2024 08:36 PM    MCH 30.3 12/17/2024 08:36 PM    MCHC 33.8 12/17/2024 08:36 PM    MPV 10.9 12/17/2024 08:36 PM    NEUTSPOLYS 55.60 12/17/2024 08:36 PM    LYMPHOCYTES 29.80 12/17/2024 08:36 PM    MONOCYTES 13.70 (H) 12/17/2024 08:36 PM    EOSINOPHILS 0.30 12/17/2024 08:36 PM    BASOPHILS 0.30 12/17/2024 08:36 PM      Lipase 324 at OSH, 55 here.         Assessment and Plan:   Patient is a 39-year-old woman with alcohol induced chronic pancreatitis.  She has a possible IPMN in her pancreas.  Additionally, she has a an obstructing stone in her pancreatic duct.  Her main duct was quite dilated and her pancreas is atrophic on review of the imaging.  Anatomically, she would be a candidate for the [place] procedure to relieve chronic pancreatitis.  However, cessation of alcohol use is imperative for 6 months prior to undergoing this procedure.  Patient was extensively counseled today on alcohol cessation as well as methamphetamine cessation.  She was counseled that until she abstains from alcohol for 6 months, surgery will not be considered.  In the interim, placement of a pancreatic duct stent may provide some pain relief.  We will discuss this with our GI colleagues.  She also needs to establish care with a pain specialist who can control her pain prior to surgery and wean her from chronic opioid use postoperatively.    I had a lengthy discussion with the patient today regarding the natural history and etiology of chronic pancreatitis.  Once again, cessation of alcohol use was emphasized.  Patient asked appropriate questions, all of which were answered to her satisfaction.    Will continue to follow.      -Primary team updated on plan of care.      Jazmyn Byers MD, MS  Surgical Oncology  December 18, 2024, 2:54 PM     - Patient with hx of Splenectomy with ITP  - Neurosurgery Recommending platelets >20,000  - Last Plts transfused 2/21 in setting of Hemoptysis  - Cont Solumedrol 50 mg IVP QD; upon discharge as per d/w Jeni   - Patient will need to follow up with Jeni as outpatient for tapering of steroids ( Tele Visit)   - Txd with IVIG 2/7, 2/8, 2/17 and 2/18  - Initiated on weekly Nplate 2/17, 2/24, Next dose today; 3/3 a  - Patient will need to continue weekly Nplate upon discharge   - Cont to monitor CBC+diff and PLT count (Blue top) daily - Patient with hx of Splenectomy with ITP  - Neurosurgery Recommending platelets >20,000  - Last Plts transfused 2/21 in setting of Hemoptysis  - Cont Solumedrol 50 mg IVP QD; upon discharge as per d/w Heme   - Patient will need to follow up with Heme as outpatient for tapering of steroids ( Tele Visit)   - Txd with IVIG 2/7, 2/8, 2/17 and 2/18  - Initiated on weekly Nplate 2/17, 2/24, Next dose today; 3/3 a  - Patient will need to continue weekly Nplate upon discharge   - Cont to monitor CBC+diff and PLT count (Blue top)

## 2024-12-18 NOTE — ED TRIAGE NOTES
Patient to green 26 by amos for   Chief Complaint   Patient presents with    Sent by MD    Abdominal Pain     Patient BIBA from [facility], [place] for a pancreatic duct stone. Patient endorses pain x2 weeks, N/V.       AxO 4, VSS, endorses 10/10 upper abdominal pain.   
Yes

## 2024-12-18 NOTE — ED NOTES
Rounded on patient. Patient sleeping on stretcher. Denies further needs at this time. Call light within reach.

## 2024-12-18 NOTE — ED PROVIDER NOTES
CHIEF COMPLAINT  Chief Complaint   Patient presents with    Sent by MD    Abdominal Pain     Patient BIBA from [facility] for a pancreatic duct stone. Patient endorses pain x2 weeks, N/V.        LIMITATION TO HISTORY   Select: None    HPI    Brenda Cartagena is a 39 y.o. female who presents to the Emergency Department as a transfer from [facility] over concerns of a pancreatic duct stone.  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  Has been having intermittent episodes of left quadrant abdominal pain.  Was most recently admitted in November for a possible pancreatic mass versus pseudocyst underwent MRI of her abdomen which did show pancreatic duct dilatation with stones and debris.  Has been having ongoing abdominal pain for the past 2 weeks admits to nausea vomiting, no fevers or other complaints.  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    OUTSIDE HISTORIAN(S):  Select: Sending ED physician    EXTERNAL RECORDS REVIEWED  Select: Other reviewed patient's notes.      PAST MEDICAL HISTORY  Past Medical History:   Diagnosis Date    Indigestion 13 yrears    heart burn     .    SURGICAL HISTORY  Past Surgical History:   Procedure Laterality Date    MA UPPER GI ENDOSCOPY,DIAGNOSIS N/A 11/12/2024    Procedure: GASTROSCOPY;  Surgeon: Umesh Gardner D.O.;  Location: SURGERY [facility];  Service: Gastroenterology    TONSILLECTOMY Bilateral 7/21/2015    Procedure: TONSILLECTOMY;  Surgeon: Jaclyn North M.D.;  Location: [facility];  Service:     ORIF, FRACTURE, TIBIA  9/5/2012    Performed by BOBO BRITT at SURGERY [facility]    IRRIGATION & DEBRIDEMENT ORTHO  5/18/2012    Performed by BOBO BRITT at [facility]    WOUND CLOSURE ORTHO  5/18/2012    Performed by BOBO BRITT at SURGERY  [facility] ORS    WOUND CLOSURE DELAYED  5/16/2012    Performed by BOBO BRITT at SURGERY [facility] ORS    FASCIOTOMY  5/13/2012    Performed by ANN LEBRON at SURGERY [facility] ORS    TIBIA NAILING INTRAMEDULLARY  5/11/2012    Performed by BOBO BRITT at SURGERY [facility] ORS    EXTERNAL FIXATOR REMOVAL  5/11/2012    Performed by BOBO BRITT at SURGERY [facility] ORS    INCISION AND DRAINAGE ORTHOPEDIC  5/9/2012    Performed by TRICIA SOLIZ at SURGERY [facility] ORS    EXTERNAL FIXATOR APPLICATION  5/9/2012    Performed by TRICIA SOLIZ at SURGERY [facility] ORS         FAMILY HISTORY  No family history on file.       SOCIAL HISTORY  Social History     Socioeconomic History    Marital status:      Spouse name: Not on file    Number of children: Not on file    Years of education: Not on file    Highest education level: Not on file   Occupational History    Not on file   Tobacco Use    Smoking status: Every Day     Current packs/day: 0.50     Average packs/day: 0.5 packs/day for 2.0 years (1.0 ttl pk-yrs)     Types: Cigarettes    Smokeless tobacco: Not on file   Substance and Sexual Activity    Alcohol use: Yes     Comment: every day 2-3 glasses of an alcholic beverage ,Vodka    Drug use: Yes     Types: Inhaled     Comment: 2007 to 2008 Meth. used Meth last Feb 10 for one day as stated by pt    Sexual activity: Not on file   Other Topics Concern    Not on file   Social History Narrative    Not on file     Social Drivers of Health     Financial Resource Strain: Not on file   Food Insecurity: No Food Insecurity (11/10/2024)    Hunger Vital Sign     Worried About Running Out of Food in the Last Year: Never true     Ran Out of Food in the Last Year: Never true   Transportation Needs: No Transportation Needs (11/10/2024)    PRAPARE - Transportation     Lack of Transportation (Medical): No     Lack of Transportation (Non-Medical): No   Physical Activity: Not on file   Stress:  "Not on file   Social Connections: Not on file   Intimate Partner Violence: Not At Risk (11/10/2024)    Humiliation, Afraid, Rape, and Kick questionnaire     Fear of Current or Ex-Partner: No     Emotionally Abused: No     Physically Abused: No     Sexually Abused: No   Housing Stability: Low Risk  (11/10/2024)    Housing Stability Vital Sign     Unable to Pay for Housing in the Last Year: No     Number of Times Moved in the Last Year: 0     Homeless in the Last Year: No         CURRENT MEDICATIONS  No current facility-administered medications on file prior to encounter.     Current Outpatient Medications on File Prior to Encounter   Medication Sig Dispense Refill    folic acid (FOLVITE) 1 MG Tab Take 1 Tablet by mouth every day. 30 Tablet 0    multivitamin Tab Take 1 Tablet by mouth every day. 30 Tablet 0           ALLERGIES  Allergies   Allergen Reactions    Peanut-Derived Swelling     Tongue swelling    Tree Nuts Food Allergy Swelling     All nuts        PHYSICAL EXAM  VITAL SIGNS:/78   Pulse 81   Temp 36.4 °C (97.6 °F) (Temporal)   Resp 16   Ht 1.6 m (5' 3\")   Wt 55.8 kg (123 lb)   SpO2 96%   BMI 21.79 kg/m²       VITALS - vital signs documented prior to this note have been reviewed and noted,  GENERAL - awake, alert, oriented, GCS 15, no apparent distress, non-toxic  appearing  HEENT - normocephalic, atraumatic, pupils equal, sclera anicteric, mucus  membranes moist  NECK - supple, no meningismus, full active range of motion, trachea midline  CARDIOVASCULAR - regular rate/rhythm, no murmurs/gallops/rubs  PULMONARY - no respiratory distress, speaking in full sentences, clear to  auscultation bilaterally, no wheezing/ronchi/rales, no accessory muscle use  GASTROINTESTINAL - soft, non-tender, non-distended, no rebound, guarding,  or peritonitis  GENITOURINARY - Deferred  NEUROLOGIC - Awake alert, normal mental status, speech fluid, cognition  normal, moves all extremities  MUSCULOSKELETAL - no obvious " asymmetry or deformities present  EXTREMITIES - warm, well-perfused, no cyanosis or significant edema  DERMATOLOGIC - warm, dry, no rashes, no jaundice  PSYCHIATRIC - normal affect, normal insight, normal concentration    DIAGNOSTIC STUDIES / PROCEDURES    LABS  Labs Reviewed   CBC WITH DIFFERENTIAL - Abnormal; Notable for the following components:       Result Value    RBC 3.20 (*)     Hemoglobin 9.7 (*)     Hematocrit 28.7 (*)     Monocytes 13.70 (*)     All other components within normal limits   COMP METABOLIC PANEL - Abnormal; Notable for the following components:    Co2 18 (*)     Glucose 133 (*)     Creatinine 0.28 (*)     All other components within normal limits   URINE DRUG SCREEN - Abnormal; Notable for the following components:    Benzodiazepines Positive (*)     All other components within normal limits   LIPASE   HCG QUAL SERUM   ESTIMATED GFR   CA 19-9   CEA   MAGNESIUM   PHOSPHORUS       No leukocytosis lipase is normal  RADIOLOGY  I have independently interpreted the diagnostic imaging associated with this visit and am waiting the final reading from the radiologist.   My preliminary interpretation is as follows: Unfortunately the PACS imaging is down this evening unable to review images.      Radiologist interpretation:   CT-PANCREAS AND ABDOMEN WITH & W/O   Final Result         1.  Heterogeneous lesion in the pancreatic head with dilatation the pancreatic duct concerning for pancreatic mass lesion, recommend follow-up MRI pancreas for further characterization.   2.  Multiple intermediate density soft tissue masses along the greater curvature of the stomach, appearance most compatible with underlying neoplastic disease, component of hemorrhage is not excluded given density.   3.  Hepatomegaly           COURSE & MEDICAL DECISION MAKING    ED COURSE:        INTERVENTIONS BY ME:  Medications   folic acid (Folvite) tablet 1 mg (has no administration in time range)   multivitamin tablet 1 Tablet (has no  administration in time range)   acetaminophen (Tylenol) tablet 650 mg (has no administration in time range)   senna-docusate (Pericolace Or Senokot S) 8.6-50 MG per tablet 2 Tablet (has no administration in time range)     And   polyethylene glycol/lytes (Miralax) Packet 1 Packet (has no administration in time range)   labetalol (Normodyne/Trandate) injection 10 mg (has no administration in time range)   ondansetron (Zofran) syringe/vial injection 4 mg (has no administration in time range)   ondansetron (Zofran ODT) dispertab 4 mg (has no administration in time range)   promethazine (Phenergan) tablet 12.5-25 mg (has no administration in time range)   promethazine (Phenergan) suppository 12.5-25 mg (has no administration in time range)   prochlorperazine (Compazine) injection 5-10 mg (has no administration in time range)   HYDROcodone-acetaminophen (Norco) 5-325 MG per tablet 1 Tablet (has no administration in time range)   morphine 4 MG/ML injection 4 mg (has no administration in time range)   HYDROmorphone (Dilaudid) injection 1 mg (1 mg Intravenous Given 12/17/24 2101)   iohexol (OMNIPAQUE) 350 mg/mL (IV) (80 mL Intravenous Given 12/17/24 2250)                   INITIAL ASSESSMENT, COURSE AND PLAN  Care Narrative: Patient presented as a transfer from [place] over concerns of a recurrent pancreatic duct stone.  Was seen at the outside facility, unclear whether she had any labs or imaging prior to transfer as I do not see any paperwork with her.  Though hepatobiliary was consulted they are thus I did discuss the pace with on-call hepatobiliary surgeon, Dr. Byers and plan was made to obtain new imaging specifically a CT with pancreas protocol which was obtained. Agreed to evaluate the patient in the morning with the plan for GI consultation as well.  Labs were repeated that there was a mild anemia were otherwise nonactionable.  CT pancreas was obtained though unfortunately the PACS system is down this evening  unable to review the images the results were coming through, and there was a noted heterogeneous pancreatic head dilatation and duct, as well soft tissue masses along the greater curvature of the stomach.  Spoke with Dr. Head patient will be admitted to medicine for further care and evaluation.             ADDITIONAL PROBLEM LIST    DISPOSITION AND DISCUSSIONS  I have discussed management of the patient with the following physicians and NEIL's:  dr kaela head      FINAL DIAGNOSIS  1 pancreatic duct dilatation  2.  Pancreatic mass  3.  Stomach mass  4.  Anemia         Electronically signed by: Bowen Rome DO12:11 AM 12/17/24

## 2024-12-18 NOTE — DISCHARGE PLANNING
Patient rolled back to observation/outpatient status per attending MD determination César Chaparro MD and UR committee MD secondary review César Keller MD. Condition code 44 completed.

## 2024-12-18 NOTE — CONSULTS
Gastroenterology Initial Consult Note               Author:  Stacie Sen M.D. Date & Time Created: 12/18/2024 10:48 AM       Patient ID:  Name:             Brenda Cartagena  YOB: 1985  Age:                 39 y.o.  female  MRN:               [id number]      Referring Provider:  César Chaparro MD        Presenting Chief Complaint:  Pancreatic lesion      History of Present Illness:    This is a very pleasant 39 y.o. female transferred from [facility] for a pancreatic duct stone with abdominal pain, nausea and vomiting.      She was recently admitted to [facility] in November for pancreatic mass versus pseudocyst and underwent MRI which did confirm pancreatic ductal dilation with stones and debris, cystic lesion with internal debris and/or stones in the distal pancreatic body 15mm, IPMN favored.  Seen by [facility] and on 11/12/24 she underwent EGD and was found to have mild erosive esophagitis and gastric erosions negative for H pylori.  She underwent EUS about 2 weeks ago and was referred to Surgical Oncology for stone removal.  Surgery could not be scheduled for a few months and [facility] contacted Dr. Byers , Hepatobiliary surgery.    Patient presented to [facility] on 11/27 with pancreatitis, lipase 324.      Abdominal pain for the past 2 weeks.  Last drink was 4-5 days ago.  CT pancreas last night and a heterogeneous lesion in the pancreatic head with dilation of the pancreatic duct concerning for pancreatic mass.  There were multiple intermediate density, soft tissue masses along the greater curvature of the stomach compatible with underlying neoplastic disease (has had recent EGD and EUS and no neoplasm identified)  CBC notable for hemoglobin 9.7 and hematocrit 28.7 with normal MCV.      She was pancytopenic in June 2024 and at that time had been drinking half a pint of vodka daily with methamphetamine use.  CT pancreas and abdomen at that time  showed fatty liver and hepatomegaly 21.9 cm, normal no ductal dilation, pancreas normal with no focal mass segmental dilation with dilation of the pancreatic duct measuring 8 mm at the body and head that appears to taper to the ampulla.  After 4 days, she left AMA    Chart review reveals acute pancreatitis in 2014 and she was described to have acute alcoholic pancreatitis in June but lipase was 154.        Review of Systems:  Review of Systems   Constitutional: Negative.    HENT: Negative.     Eyes: Negative.    Respiratory: Negative.     Cardiovascular: Negative.    Gastrointestinal:  Positive for abdominal pain and nausea.   Genitourinary: Negative.    Musculoskeletal: Negative.    Skin: Negative.    Neurological: Negative.    Endo/Heme/Allergies: Negative.              Past Medical History:  Past Medical History:   Diagnosis Date    Indigestion 13 yrears    heart burn     Active Hospital Problems    Diagnosis     Cystic mass of pancreas [K86.2]     Gastric lesion [K31.9]     Anxiety [F41.9]     Eczema [L30.9]     Anemia [D64.9]     Intractable abdominal pain [R10.9]     Uncomplicated alcohol dependence (HCC) [F10.20]          Past Surgical History:  Past Surgical History:   Procedure Laterality Date    CA UPPER GI ENDOSCOPY,DIAGNOSIS N/A 11/12/2024    Procedure: GASTROSCOPY;  Surgeon: Umesh Gardner D.O.;  Location: SURGERY [facility];  Service: Gastroenterology    TONSILLECTOMY Bilateral 7/21/2015    Procedure: TONSILLECTOMY;  Surgeon: Jaclyn North M.D.;  Location: [facility];  Service:     ORIF, FRACTURE, TIBIA  9/5/2012    Performed by BOBO BRITT at [facility]    IRRIGATION & DEBRIDEMENT ORTHO  5/18/2012    Performed by BOBO BRITT at [facility]    WOUND CLOSURE ORTHO  5/18/2012    Performed by BOBO BRITT at [facility]    WOUND CLOSURE DELAYED  5/16/2012    Performed by BOBO BRITT at [facility]    FASCIOTOMY   5/13/2012    Performed by ANN LEBRON at SURGERY [facility]    TIBIA NAILING INTRAMEDULLARY  5/11/2012    Performed by BOBO BRITT at SURGERY [facility]    EXTERNAL FIXATOR REMOVAL  5/11/2012    Performed by BOBO BRITT at SURGERY [facility]    INCISION AND DRAINAGE ORTHOPEDIC  5/9/2012    Performed by TRICIA SOLIZ at [facility]    EXTERNAL FIXATOR APPLICATION  5/9/2012    Performed by TRICIA SOLIZ at SURGERY [facility]           Hospital Medications:  Current Facility-Administered Medications   Medication Dose Frequency Provider Last Rate Last Admin    folic acid (Folvite) tablet 1 mg  1 mg DAILY Dez Head M.D.   1 mg at 12/18/24 1019    multivitamin tablet 1 Tablet  1 Tablet DAILY Dez Head M.D.   1 Tablet at 12/18/24 1019    acetaminophen (Tylenol) tablet 650 mg  650 mg Q6HRS PRN Dez Head M.D.        senna-docusate (Pericolace Or Senokot S) 8.6-50 MG per tablet 2 Tablet  2 Tablet Q EVENING Dez Head M.D.        And    polyethylene glycol/lytes (Miralax) Packet 1 Packet  1 Packet QDAY PRN Dez Head M.D.        labetalol (Normodyne/Trandate) injection 10 mg  10 mg Q4HRS PRN Dez Head M.D.        ondansetron (Zofran) syringe/vial injection 4 mg  4 mg Q4HRS PRN Dez Head M.D.   4 mg at 12/18/24 1018    ondansetron (Zofran ODT) dispertab 4 mg  4 mg Q4HRS PRN Dez Head M.D.        promethazine (Phenergan) tablet 12.5-25 mg  12.5-25 mg Q4HRS PRN Dez Head M.D.        promethazine (Phenergan) suppository 12.5-25 mg  12.5-25 mg Q4HRS PRN Dez Head M.D.        prochlorperazine (Compazine) injection 5-10 mg  5-10 mg Q4HRS PRN Dez Head M.D.        morphine 10 mg/mL injection 8 mg  8 mg Q3HRS PRN Dez Head M.D.   8 mg at 12/18/24 0804    HYDROcodone-acetaminophen (Norco) 5-325 MG per tablet 1 Tablet  1 Tablet Q4HRS PRN Dez Head M.D.   1 Tablet at 12/18/24 0059     hydrocortisone 1 % cream   BID Dez Head M.D.   Given at 12/18/24 0138    omeprazole (PriLOSEC) capsule 20 mg  20 mg BID Dez Head M.D.   20 mg at 12/18/24 0425   Last reviewed on 12/18/2024  2:02 AM by Vanesa Carey       Current Outpatient Medications:  Medications Prior to Admission   Medication Sig Dispense Refill Last Dose/Taking    omeprazole (PRILOSEC) 20 MG delayed-release capsule Take 20 mg by mouth every day.   12/17/2024 Evening    oxyCODONE immediate-release (ROXICODONE) 5 MG Tab Take 5 mg by mouth every 6 hours as needed for Severe Pain. 5 days supply completed   12/5/2024    oxyCODONE immediate release (ROXICODONE) 10 MG immediate release tablet Take 10 mg by mouth every 6 hours as needed for Moderate Pain. 5 day supply   12/16/2024 Evening         Medication Allergies:  Allergies   Allergen Reactions    Peanut-Derived Swelling     Tongue swelling    Tree Nuts Food Allergy Swelling     All nuts          Family Medical History:  No family history on file.      Social History:  Social History     Socioeconomic History    Marital status:      Spouse name: Not on file    Number of children: Not on file    Years of education: Not on file    Highest education level: Not on file   Occupational History    Not on file   Tobacco Use    Smoking status: Every Day     Current packs/day: 0.50     Average packs/day: 0.5 packs/day for 2.0 years (1.0 ttl pk-yrs)     Types: Cigarettes    Smokeless tobacco: Not on file   Substance and Sexual Activity    Alcohol use: Yes     Comment: every day 2-3 glasses of an alcholic beverage ,Vodka    Drug use: Yes     Types: Inhaled     Comment: 2007 to 2008 Meth. used Meth last Feb 10 for one day as stated by pt    Sexual activity: Not on file   Other Topics Concern    Not on file   Social History Narrative    Not on file     Social Drivers of Health     Financial Resource Strain: Not on file   Food Insecurity: No Food Insecurity (11/10/2024)    Hunger  "Vital Sign     Worried About Running Out of Food in the Last Year: Never true     Ran Out of Food in the Last Year: Never true   Transportation Needs: No Transportation Needs (11/10/2024)    PRAPARE - Transportation     Lack of Transportation (Medical): No     Lack of Transportation (Non-Medical): No   Physical Activity: Not on file   Stress: Not on file   Social Connections: Not on file   Intimate Partner Violence: Not At Risk (11/10/2024)    Humiliation, Afraid, Rape, and Kick questionnaire     Fear of Current or Ex-Partner: No     Emotionally Abused: No     Physically Abused: No     Sexually Abused: No   Housing Stability: Low Risk  (11/10/2024)    Housing Stability Vital Sign     Unable to Pay for Housing in the Last Year: No     Number of Times Moved in the Last Year: 0     Homeless in the Last Year: No         Vital signs:  Weight/BMI: Body mass index is 21.79 kg/m².  BP 95/73   Pulse 87   Temp 36.5 °C (97.7 °F) (Temporal)   Resp 17   Ht 1.6 m (5' 3\")   Wt 55.8 kg (123 lb)   SpO2 94%   Vitals:    12/18/24 0527 12/18/24 0628 12/18/24 0804 12/18/24 0830   BP: 111/76 124/85  95/73   Pulse: 98 100  87   Resp:  20 18 17   Temp:  36.4 °C (97.5 °F)  36.5 °C (97.7 °F)   TempSrc:  Temporal  Temporal   SpO2: 96% 98%  94%   Weight:       Height:  1.6 m (5' 3\")       Oxygen Therapy:  Pulse Oximetry: 94 %, O2 (LPM): 0, O2 Delivery Device: None - Room Air    Intake/Output Summary (Last 24 hours) at 12/18/2024 1048  Last data filed at 12/17/2024 2023  Gross per 24 hour   Intake 1000 ml   Output --   Net 1000 ml         Physical Exam:  Physical Exam  Constitutional:       Comments: Arouses easily, ?sleepy from medication   HENT:      Head: Normocephalic and atraumatic.      Nose: Nose normal.      Mouth/Throat:      Mouth: Mucous membranes are moist.   Eyes:      General: No scleral icterus.     Pupils: Pupils are equal, round, and reactive to light.   Cardiovascular:      Rate and Rhythm: Regular rhythm.      Heart " sounds: Normal heart sounds.   Pulmonary:      Effort: Pulmonary effort is normal.      Breath sounds: Normal breath sounds.   Abdominal:      General: Bowel sounds are normal.      Palpations: Abdomen is soft.      Comments: Nontender with gentle palpation   Musculoskeletal:         General: Normal range of motion.      Cervical back: Neck supple.   Skin:     General: Skin is warm and dry.   Neurological:      Mental Status: She is oriented to person, place, and time.                 Labs:  Recent Labs     12/17/24 2036   SODIUM 137   POTASSIUM 3.6   CHLORIDE 105   CO2 18*   BUN 9   CREATININE 0.28*   CALCIUM 8.5     Recent Labs     12/17/24 2036   ALTSGPT 17   ASTSGOT 30   ALKPHOSPHAT 70   TBILIRUBIN 0.6   LIPASE 55   GLUCOSE 133*     Recent Labs     12/17/24 2036   WBC 5.9   NEUTSPOLYS 55.60   LYMPHOCYTES 29.80   MONOCYTES 13.70*   EOSINOPHILS 0.30   BASOPHILS 0.30   ASTSGOT 30   ALTSGPT 17   ALKPHOSPHAT 70   TBILIRUBIN 0.6     Recent Labs     12/17/24 2036   RBC 3.20*   HEMOGLOBIN 9.7*   HEMATOCRIT 28.7*   PLATELETCT 201     Recent Results (from the past 24 hours)   CBC WITH DIFFERENTIAL    Collection Time: 12/17/24  8:36 PM   Result Value Ref Range    WBC 5.9 4.8 - 10.8 K/uL    RBC 3.20 (L) 4.20 - 5.40 M/uL    Hemoglobin 9.7 (L) 12.0 - 16.0 g/dL    Hematocrit 28.7 (L) 37.0 - 47.0 %    MCV 89.7 81.4 - 97.8 fL    MCH 30.3 27.0 - 33.0 pg    MCHC 33.8 32.2 - 35.5 g/dL    RDW 48.7 35.9 - 50.0 fL    Platelet Count 201 164 - 446 K/uL    MPV 10.9 9.0 - 12.9 fL    Neutrophils-Polys 55.60 44.00 - 72.00 %    Lymphocytes 29.80 22.00 - 41.00 %    Monocytes 13.70 (H) 0.00 - 13.40 %    Eosinophils 0.30 0.00 - 6.90 %    Basophils 0.30 0.00 - 1.80 %    Immature Granulocytes 0.30 0.00 - 0.90 %    Nucleated RBC 0.00 0.00 - 0.20 /100 WBC    Neutrophils (Absolute) 3.27 1.82 - 7.42 K/uL    Lymphs (Absolute) 1.76 1.00 - 4.80 K/uL    Monos (Absolute) 0.81 0.00 - 0.85 K/uL    Eos (Absolute) 0.02 0.00 - 0.51 K/uL    Baso (Absolute)  0.02 0.00 - 0.12 K/uL    Immature Granulocytes (abs) 0.02 0.00 - 0.11 K/uL    NRBC (Absolute) 0.00 K/uL   CMP    Collection Time: 12/17/24  8:36 PM   Result Value Ref Range    Sodium 137 135 - 145 mmol/L    Potassium 3.6 3.6 - 5.5 mmol/L    Chloride 105 96 - 112 mmol/L    Co2 18 (L) 20 - 33 mmol/L    Anion Gap 14.0 7.0 - 16.0    Glucose 133 (H) 65 - 99 mg/dL    Bun 9 8 - 22 mg/dL    Creatinine 0.28 (L) 0.50 - 1.40 mg/dL    Calcium 8.5 8.5 - 10.5 mg/dL    Correct Calcium 8.7 8.5 - 10.5 mg/dL    AST(SGOT) 30 12 - 45 U/L    ALT(SGPT) 17 2 - 50 U/L    Alkaline Phosphatase 70 30 - 99 U/L    Total Bilirubin 0.6 0.1 - 1.5 mg/dL    Albumin 3.8 3.2 - 4.9 g/dL    Total Protein 7.1 6.0 - 8.2 g/dL    Globulin 3.3 1.9 - 3.5 g/dL    A-G Ratio 1.2 g/dL   LIPASE    Collection Time: 12/17/24  8:36 PM   Result Value Ref Range    Lipase 55 11 - 82 U/L   HCG QUAL SERUM    Collection Time: 12/17/24  8:36 PM   Result Value Ref Range    Beta-Hcg Qualitative Serum Negative Negative   ESTIMATED GFR    Collection Time: 12/17/24  8:36 PM   Result Value Ref Range    GFR (CKD-EPI) 138 >60 mL/min/1.73 m 2   URINE DRUG SCREEN    Collection Time: 12/17/24 10:31 PM   Result Value Ref Range    Amphetamines Urine Negative Negative    Barbiturates Negative Negative    Benzodiazepines Positive (A) Negative    Cocaine Metabolite Negative Negative    Fentanyl, Urine Negative Negative    Methadone Negative Negative    Opiates Negative Negative    Oxycodone Negative Negative    Phencyclidine -Pcp Negative Negative    Propoxyphene Negative Negative    Cannabinoid Metab Negative Negative         Radiology Review:  CT-PANCREAS AND ABDOMEN WITH & W/O   Final Result         1.  Heterogeneous lesion in the pancreatic head with dilatation the pancreatic duct concerning for pancreatic mass lesion, recommend follow-up MRI pancreas for further characterization.   2.  Multiple intermediate density soft tissue masses along the greater curvature of the stomach,  appearance most compatible with underlying neoplastic disease, component of hemorrhage is not excluded given density.   3.  Hepatomegaly            MDM (Data Review):   -Records reviewed and summarized in current documentation  -I personally reviewed and interpreted the laboratory results  -I personally reviewed the radiology images    Assessment/Recommendations:    Pancreatic duct stone 4mm in a dilated portion of the PD in the genu of the pancrea but the downstream portion is too small to removed and therefore cannot be done endoscopically and Puestow recommended.   Pancreatic cystic lesion, probable main duct IPMN  Chronic pancreatitis throughout noted on EUS  AUD  Substance abuse/ methamphetamine    Recs:  Discussed EUS findings with Dr. Byers.  Unfortunately we cannot remove stone and may need surgical removal.      Message sent to Dr. Krysta Sen M.D.          Core Quality Measures   Reviewed items:  Labs, Medications and Radiology reports reviewed

## 2024-12-18 NOTE — ASSESSMENT & PLAN NOTE
12/18/2024  Due to alcohol.  Patient may be a candidate for Puestow procedure to relieve patient's chronic pancreatitis.  Need cessation of alcohol for 6 months prior to undergoing procedure.    Plan for outpatient follow-up with hepatobiliary surgery.

## 2024-12-18 NOTE — TELEPHONE ENCOUNTER
Phone Number Called: [phone]      Message: [facility] will be faxing EUS- no specimen was collected.

## 2024-12-19 DIAGNOSIS — K86.89 PANCREATIC DUCT STONES: ICD-10-CM

## 2024-12-19 LAB
ALBUMIN SERPL BCP-MCNC: 3.8 G/DL (ref 3.2–4.9)
ALBUMIN/GLOB SERPL: 1.2 G/DL
ALP SERPL-CCNC: 69 U/L (ref 30–99)
ALT SERPL-CCNC: 24 U/L (ref 2–50)
ANION GAP SERPL CALC-SCNC: 12 MMOL/L (ref 7–16)
AST SERPL-CCNC: 50 U/L (ref 12–45)
BASOPHILS # BLD AUTO: 0.3 % (ref 0–1.8)
BASOPHILS # BLD: 0.02 K/UL (ref 0–0.12)
BILIRUB SERPL-MCNC: 0.5 MG/DL (ref 0.1–1.5)
BUN SERPL-MCNC: 8 MG/DL (ref 8–22)
CALCIUM ALBUM COR SERPL-MCNC: 8.8 MG/DL (ref 8.5–10.5)
CALCIUM SERPL-MCNC: 8.6 MG/DL (ref 8.5–10.5)
CHLORIDE SERPL-SCNC: 101 MMOL/L (ref 96–112)
CO2 SERPL-SCNC: 23 MMOL/L (ref 20–33)
CREAT SERPL-MCNC: 0.34 MG/DL (ref 0.5–1.4)
EOSINOPHIL # BLD AUTO: 0.08 K/UL (ref 0–0.51)
EOSINOPHIL NFR BLD: 1.2 % (ref 0–6.9)
ERYTHROCYTE [DISTWIDTH] IN BLOOD BY AUTOMATED COUNT: 49.7 FL (ref 35.9–50)
GFR SERPLBLD CREATININE-BSD FMLA CKD-EPI: 132 ML/MIN/1.73 M 2
GLOBULIN SER CALC-MCNC: 3.1 G/DL (ref 1.9–3.5)
GLUCOSE SERPL-MCNC: 151 MG/DL (ref 65–99)
HCT VFR BLD AUTO: 30.1 % (ref 37–47)
HGB BLD-MCNC: 9.9 G/DL (ref 12–16)
IMM GRANULOCYTES # BLD AUTO: 0.02 K/UL (ref 0–0.11)
IMM GRANULOCYTES NFR BLD AUTO: 0.3 % (ref 0–0.9)
LYMPHOCYTES # BLD AUTO: 1.43 K/UL (ref 1–4.8)
LYMPHOCYTES NFR BLD: 21.8 % (ref 22–41)
MAGNESIUM SERPL-MCNC: 1.5 MG/DL (ref 1.5–2.5)
MCH RBC QN AUTO: 30.2 PG (ref 27–33)
MCHC RBC AUTO-ENTMCNC: 32.9 G/DL (ref 32.2–35.5)
MCV RBC AUTO: 91.8 FL (ref 81.4–97.8)
MONOCYTES # BLD AUTO: 0.83 K/UL (ref 0–0.85)
MONOCYTES NFR BLD AUTO: 12.6 % (ref 0–13.4)
NEUTROPHILS # BLD AUTO: 4.19 K/UL (ref 1.82–7.42)
NEUTROPHILS NFR BLD: 63.8 % (ref 44–72)
NRBC # BLD AUTO: 0 K/UL
NRBC BLD-RTO: 0 /100 WBC (ref 0–0.2)
PLATELET # BLD AUTO: 195 K/UL (ref 164–446)
PMV BLD AUTO: 11.3 FL (ref 9–12.9)
POTASSIUM SERPL-SCNC: 3.1 MMOL/L (ref 3.6–5.5)
PROT SERPL-MCNC: 6.9 G/DL (ref 6–8.2)
RBC # BLD AUTO: 3.28 M/UL (ref 4.2–5.4)
SODIUM SERPL-SCNC: 136 MMOL/L (ref 135–145)
WBC # BLD AUTO: 6.6 K/UL (ref 4.8–10.8)

## 2024-12-19 PROCEDURE — 96372 THER/PROPH/DIAG INJ SC/IM: CPT

## 2024-12-19 PROCEDURE — 36415 COLL VENOUS BLD VENIPUNCTURE: CPT

## 2024-12-19 PROCEDURE — 700102 HCHG RX REV CODE 250 W/ 637 OVERRIDE(OP): Mod: UD | Performed by: STUDENT IN AN ORGANIZED HEALTH CARE EDUCATION/TRAINING PROGRAM

## 2024-12-19 PROCEDURE — G0378 HOSPITAL OBSERVATION PER HR: HCPCS

## 2024-12-19 PROCEDURE — 83735 ASSAY OF MAGNESIUM: CPT

## 2024-12-19 PROCEDURE — 700111 HCHG RX REV CODE 636 W/ 250 OVERRIDE (IP): Mod: UD | Performed by: STUDENT IN AN ORGANIZED HEALTH CARE EDUCATION/TRAINING PROGRAM

## 2024-12-19 PROCEDURE — 96376 TX/PRO/DX INJ SAME DRUG ADON: CPT

## 2024-12-19 PROCEDURE — 85025 COMPLETE CBC W/AUTO DIFF WBC: CPT

## 2024-12-19 PROCEDURE — A9270 NON-COVERED ITEM OR SERVICE: HCPCS | Mod: UD | Performed by: STUDENT IN AN ORGANIZED HEALTH CARE EDUCATION/TRAINING PROGRAM

## 2024-12-19 PROCEDURE — 80053 COMPREHEN METABOLIC PANEL: CPT

## 2024-12-19 PROCEDURE — 99233 SBSQ HOSP IP/OBS HIGH 50: CPT | Performed by: STUDENT IN AN ORGANIZED HEALTH CARE EDUCATION/TRAINING PROGRAM

## 2024-12-19 RX ORDER — BUPRENORPHINE 2 MG/1
2 TABLET SUBLINGUAL 2 TIMES DAILY
Status: DISCONTINUED | OUTPATIENT
Start: 2024-12-19 | End: 2024-12-20

## 2024-12-19 RX ORDER — POTASSIUM CHLORIDE 1500 MG/1
40 TABLET, EXTENDED RELEASE ORAL EVERY 6 HOURS
Status: COMPLETED | OUTPATIENT
Start: 2024-12-19 | End: 2024-12-19

## 2024-12-19 RX ORDER — ENOXAPARIN SODIUM 100 MG/ML
40 INJECTION SUBCUTANEOUS DAILY
Status: DISCONTINUED | OUTPATIENT
Start: 2024-12-19 | End: 2024-12-23 | Stop reason: HOSPADM

## 2024-12-19 RX ADMIN — OXYCODONE HYDROCHLORIDE 10 MG: 10 TABLET ORAL at 07:57

## 2024-12-19 RX ADMIN — HYDROMORPHONE HYDROCHLORIDE 1 MG: 1 INJECTION, SOLUTION INTRAMUSCULAR; INTRAVENOUS; SUBCUTANEOUS at 19:52

## 2024-12-19 RX ADMIN — ONDANSETRON 4 MG: 4 TABLET, ORALLY DISINTEGRATING ORAL at 20:09

## 2024-12-19 RX ADMIN — OXYCODONE HYDROCHLORIDE 10 MG: 10 TABLET ORAL at 23:34

## 2024-12-19 RX ADMIN — OMEPRAZOLE 20 MG: 20 CAPSULE, DELAYED RELEASE ORAL at 18:11

## 2024-12-19 RX ADMIN — FOLIC ACID 1 MG: 1 TABLET ORAL at 04:31

## 2024-12-19 RX ADMIN — OXYCODONE HYDROCHLORIDE 10 MG: 10 TABLET ORAL at 03:20

## 2024-12-19 RX ADMIN — HYDROCORTISONE: 1 CREAM TOPICAL at 04:31

## 2024-12-19 RX ADMIN — ONDANSETRON 4 MG: 4 TABLET, ORALLY DISINTEGRATING ORAL at 04:31

## 2024-12-19 RX ADMIN — POTASSIUM CHLORIDE 40 MEQ: 1500 TABLET, EXTENDED RELEASE ORAL at 09:24

## 2024-12-19 RX ADMIN — OXYCODONE HYDROCHLORIDE 10 MG: 10 TABLET ORAL at 18:10

## 2024-12-19 RX ADMIN — POTASSIUM CHLORIDE 40 MEQ: 1500 TABLET, EXTENDED RELEASE ORAL at 13:55

## 2024-12-19 RX ADMIN — OMEPRAZOLE 20 MG: 20 CAPSULE, DELAYED RELEASE ORAL at 04:31

## 2024-12-19 RX ADMIN — POTASSIUM CHLORIDE 40 MEQ: 1500 TABLET, EXTENDED RELEASE ORAL at 18:10

## 2024-12-19 RX ADMIN — THERA TABS 1 TABLET: TAB at 04:31

## 2024-12-19 RX ADMIN — SENNOSIDES AND DOCUSATE SODIUM 2 TABLET: 50; 8.6 TABLET ORAL at 18:10

## 2024-12-19 RX ADMIN — ENOXAPARIN SODIUM 40 MG: 100 INJECTION SUBCUTANEOUS at 19:53

## 2024-12-19 RX ADMIN — HYDROMORPHONE HYDROCHLORIDE 1 MG: 1 INJECTION, SOLUTION INTRAMUSCULAR; INTRAVENOUS; SUBCUTANEOUS at 09:24

## 2024-12-19 RX ADMIN — HYDROCORTISONE: 1 CREAM TOPICAL at 18:10

## 2024-12-19 RX ADMIN — BUPRENORPHINE HCL 2 MG: 2 TABLET SUBLINGUAL at 18:16

## 2024-12-19 RX ADMIN — OXYCODONE HYDROCHLORIDE 10 MG: 10 TABLET ORAL at 13:55

## 2024-12-19 RX ADMIN — DULOXETINE HYDROCHLORIDE 30 MG: 30 CAPSULE, DELAYED RELEASE ORAL at 04:31

## 2024-12-19 ASSESSMENT — SOCIAL DETERMINANTS OF HEALTH (SDOH)
IN THE PAST 12 MONTHS, HAS THE ELECTRIC, GAS, OIL, OR WATER COMPANY THREATENED TO SHUT OFF SERVICE IN YOUR HOME?: NO
WITHIN THE PAST 12 MONTHS, YOU WORRIED THAT YOUR FOOD WOULD RUN OUT BEFORE YOU GOT THE MONEY TO BUY MORE: NEVER TRUE
WITHIN THE LAST YEAR, HAVE YOU BEEN AFRAID OF YOUR PARTNER OR EX-PARTNER?: NO
WITHIN THE LAST YEAR, HAVE YOU BEEN HUMILIATED OR EMOTIONALLY ABUSED IN OTHER WAYS BY YOUR PARTNER OR EX-PARTNER?: NO
WITHIN THE LAST YEAR, HAVE YOU BEEN KICKED, HIT, SLAPPED, OR OTHERWISE PHYSICALLY HURT BY YOUR PARTNER OR EX-PARTNER?: NO
WITHIN THE PAST 12 MONTHS, THE FOOD YOU BOUGHT JUST DIDN'T LAST AND YOU DIDN'T HAVE MONEY TO GET MORE: NEVER TRUE
WITHIN THE LAST YEAR, HAVE TO BEEN RAPED OR FORCED TO HAVE ANY KIND OF SEXUAL ACTIVITY BY YOUR PARTNER OR EX-PARTNER?: NO

## 2024-12-19 ASSESSMENT — LIFESTYLE VARIABLES
HAVE PEOPLE ANNOYED YOU BY CRITICIZING YOUR DRINKING: NO
ALCOHOL_USE: NO
NAUSEA AND VOMITING: NO NAUSEA AND NO VOMITING
AVERAGE NUMBER OF DAYS PER WEEK YOU HAVE A DRINK CONTAINING ALCOHOL: 0
HEADACHE, FULLNESS IN HEAD: MODERATE
DOES PATIENT WANT TO TALK TO SOMEONE ABOUT QUITTING: NO
HAVE YOU EVER FELT YOU SHOULD CUT DOWN ON YOUR DRINKING: YES
TOTAL SCORE: 1
AGITATION: NORMAL ACTIVITY
EVER FELT BAD OR GUILTY ABOUT YOUR DRINKING: NO
VISUAL DISTURBANCES: NOT PRESENT
EVER HAD A DRINK FIRST THING IN THE MORNING TO STEADY YOUR NERVES TO GET RID OF A HANGOVER: NO
PAROXYSMAL SWEATS: NO SWEAT VISIBLE
AUDITORY DISTURBANCES: NOT PRESENT
DOES PATIENT WANT TO STOP DRINKING: YES
CONSUMPTION TOTAL: NEGATIVE
HOW MANY TIMES IN THE PAST YEAR HAVE YOU HAD 5 OR MORE DRINKS IN A DAY: 0
TREMOR: NO TREMOR
ORIENTATION AND CLOUDING OF SENSORIUM: ORIENTED AND CAN DO SERIAL ADDITIONS
TOTAL SCORE: 1
ON A TYPICAL DAY WHEN YOU DRINK ALCOHOL HOW MANY DRINKS DO YOU HAVE: 0
TOTAL SCORE: 1
ANXIETY: NO ANXIETY (AT EASE)
TOTAL SCORE: 3

## 2024-12-19 ASSESSMENT — ENCOUNTER SYMPTOMS
CHILLS: 0
HEADACHES: 0
NAUSEA: 0
VOMITING: 0
SHORTNESS OF BREATH: 0
COUGH: 0
MYALGIAS: 0
FEVER: 0
PALPITATIONS: 0
ABDOMINAL PAIN: 1
DIZZINESS: 0

## 2024-12-19 ASSESSMENT — PAIN DESCRIPTION - PAIN TYPE
TYPE: ACUTE PAIN

## 2024-12-19 ASSESSMENT — PATIENT HEALTH QUESTIONNAIRE - PHQ9
1. LITTLE INTEREST OR PLEASURE IN DOING THINGS: NOT AT ALL
SUM OF ALL RESPONSES TO PHQ9 QUESTIONS 1 AND 2: 0
2. FEELING DOWN, DEPRESSED, IRRITABLE, OR HOPELESS: NOT AT ALL

## 2024-12-19 ASSESSMENT — FIBROSIS 4 INDEX: FIB4 SCORE: 1.48

## 2024-12-19 NOTE — CARE PLAN
The patient is Watcher - Medium risk of patient condition declining or worsening    Shift Goals  Clinical Goals: pain control, skin integrity, NPO at midnight  Patient Goals: pain control and otter pops  Family Goals: be at bedside    Patient is progressing towards the following goals:    Problem: Pain - Standard  Goal: Alleviation of pain or a reduction in pain to the patient’s comfort goal  Description: Target End Date:  Prior to discharge or change in level of care    Document on Vitals flowsheet    1.  Document pain using the appropriate pain scale per order or unit policy  2.  Educate and implement non-pharmacologic comfort measures (i.e. relaxation, distraction, massage, cold/heat therapy, etc.)  3.  Pain management medications as ordered  4.  Reassess pain after pain med administration per policy  5.  If opiods administered assess patient's response to pain medication is appropriate per POSS sedation scale  6.  Follow pain management plan developed in collaboration with patient and interdisciplinary team (including palliative care or pain specialists if applicable)  Outcome: Progressing     Problem: Pre Op  Goal: Optimal preparation for surgery  Description: Target End Date:  End of day 1    1.  Pre op education to patient and family/caregiver (See Patient Education)  2.  Consents obtained for surgery, anesthesia, and blood transfusions  3.  Pre op checklist completed  4.  Pre op labs, diagnostics per provider order  5.  NPO at midnight except ordered medications. Determine if patient is taking beta blockers  6.  Shower and prep with clippers as appropriate  7.  Remove dentures, contacts, valuable, and jewelry  8.  Pre op antibiotics per provider order  9.  Medical record sent to surgery with current H&P  Outcome: Progressing     Problem: Neuro Status  Goal: Neuro status will remain stable or improve  Description: Target End Date:  Prior to discharge or change in level of care    Document on Neuro assessment  in the Assessment flowsheet    1.  Assess and monitor neurologic status per provider order/protocol/unit policy  2.  Assess level of consciousness and orientation  3.  Assess for speech, dysarthria, dysphagia, facial symmetry  4.  Assess visual field, eye movements, gaze preference, pupil reaction and size  5.  Assess muscle strength and motor response in all four extremities  6.  Assess for sensation (numbness and tingling)  7.  Assess basic neuro reflexes (cough, gag, corneal)  8.  Identify changes in neuro status and report to provider for testing/treatment orders  Outcome: Progressing

## 2024-12-19 NOTE — PROGRESS NOTES
Date of Service  December 19, 2024     Chief Complaint  Sent by MD and Abdominal Pain (Patient BIBA from [facility] for a pancreatic duct stone. Patient endorses pain x2 weeks, N/V. )        Surgery Completed  N/a    Hospital Course  HD2     Interval Problem Update  No acute events  Continues to endorse abdominal pain     Problem List  Principal Problem:    Cystic mass of pancreas (POA: Yes)  Active Problems:    Uncomplicated alcohol dependence (HCC) (POA: Yes)    Intractable abdominal pain (POA: Yes)    Anemia (POA: Yes)    Gastric lesion (POA: Unknown)    Anxiety (POA: Unknown)    Eczema (POA: Unknown)    Alcohol-induced chronic pancreatitis (HCC) (POA: Yes)    Pancreatic duct stones (POA: Yes)    IPMN (intraductal papillary mucinous neoplasm) (POA: Yes)    Constipation due to opioid therapy (POA: Yes)  Resolved Problems:    * No resolved hospital problems. *       Subjective  Review of Systems   Constitutional:  Positive for malaise/fatigue and weight loss.   Gastrointestinal:  Positive for abdominal pain and nausea.   All other systems reviewed and are negative.        All other ROS negative.       Objective  Temp:  [36.5 °C (97.7 °F)-37.1 °C (98.8 °F)] 36.5 °C (97.7 °F)  Pulse:  [] 117  Resp:  [16-20] 20  BP: (109-142)/(79-98) 136/94  SpO2:  [92 %-94 %] 92 %      Physical Exam      -General: Patient is cooperative, appears stated age, in no acute distress, AAOx3     -HEENT:  Head is atraumatic, neck supple, no scleral icterus     -Neck: trachea is midline     -Respiratory: Lungs clear to auscultation bilaterally, no increased work of breathing, stable on room air       -Cardiovascular: normal rate, regular, no murmur appreciated     -Abdomen: soft, non-tender to palpation,  no scars appreciated     -: Deferred     -MSK/Extremities: atraumatic, normal range of motion and strength, ambulatory     -Integumentary: warm, dry, no obvious skin lesions or rashes appreciated     -Neurologic: alert,  speech clear and coherent, functional cognition intact     -Psychiatric:  cooperative, appropriate mood and affect        Fluids    Intake/Output Summary (Last 24 hours) at 12/19/2024 1257  Last data filed at 12/19/2024 0000  Gross per 24 hour   Intake 876 ml   Output 0 ml   Net 876 ml         Labs  Lab Results   Component Value Date/Time    SODIUM 136 12/19/2024 02:37 AM    POTASSIUM 3.1 (L) 12/19/2024 02:37 AM    CHLORIDE 101 12/19/2024 02:37 AM    CO2 23 12/19/2024 02:37 AM    GLUCOSE 151 (H) 12/19/2024 02:37 AM    BUN 8 12/19/2024 02:37 AM    CREATININE 0.34 (L) 12/19/2024 02:37 AM         Lab Results   Component Value Date/Time    PROTHROMBTM 14.3 02/20/2014 08:50 AM    INR 1.12 02/20/2014 08:50 AM         Lab Results   Component Value Date/Time    WBC 6.6 12/19/2024 02:37 AM    RBC 3.28 (L) 12/19/2024 02:37 AM    HEMOGLOBIN 9.9 (L) 12/19/2024 02:37 AM    HEMATOCRIT 30.1 (L) 12/19/2024 02:37 AM    MCV 91.8 12/19/2024 02:37 AM    MCH 30.2 12/19/2024 02:37 AM    MCHC 32.9 12/19/2024 02:37 AM    MPV 11.3 12/19/2024 02:37 AM    NEUTSPOLYS 63.80 12/19/2024 02:37 AM    LYMPHOCYTES 21.80 (L) 12/19/2024 02:37 AM    MONOCYTES 12.60 12/19/2024 02:37 AM    EOSINOPHILS 1.20 12/19/2024 02:37 AM    BASOPHILS 0.30 12/19/2024 02:37 AM         Recent Labs     12/17/24 2036 12/19/24  0237   ASTSGOT 30 50*   ALTSGPT 17 24   TBILIRUBIN 0.6 0.5   GLOBULIN 3.3 3.1        Imaging:   CT A/P:   IMPRESSION:        1.  Heterogeneous lesion in the pancreatic head with dilatation the pancreatic duct concerning for pancreatic mass lesion, recommend follow-up MRI pancreas for further characterization.  2.  Multiple intermediate density soft tissue masses along the greater curvature of the stomach, appearance most compatible with underlying neoplastic disease, component of hemorrhage is not excluded given density.  3.  Hepatomegaly     MRCP:   IMPRESSION:     1.  Pancreatic ductal dilation with stones and debris present within the pancreatic  duct in the body and head.  2.  Cystic lesion with internal debris and/or stones in the distal pancreatic body measuring 15 mm with associated contour deformity of the pancreas.  IPMN favored although exact etiology is uncertain.  3.  No adenopathy demonstrated.  4.  Normal caliber common bile duct without stone.        EUS:                  Assessment/Plan   Patient is a 39-year-old woman with alcohol induced chronic pancreatitis.  She has a possible IPMN in her pancreas.  Additionally, she has a an obstructing stone in her pancreatic duct.  Her main duct was quite dilated and her pancreas is atrophic on review of the imaging.  Anatomically, she would be a candidate for the Puestow procedure to relieve chronic pancreatitis.  However, cessation of alcohol use is imperative for 6 months prior to undergoing this procedure.     Referral to Pain Management for chronic pain control  Referral to GI (Dr. Dawson) for consideration of pancreatic stent. I have placed this referral.   F/u with me in 1 month as outpatient. Will see routinely for monitoring of sobriety    Surgical Oncology to sign off. Please call with questions.     Jazmyn Byers MD, MS  Surgical Oncology  [facility], [facility]

## 2024-12-20 PROBLEM — E43 SEVERE PROTEIN-CALORIE MALNUTRITION (HCC): Status: ACTIVE | Noted: 2024-12-20

## 2024-12-20 LAB
ANION GAP SERPL CALC-SCNC: 9 MMOL/L (ref 7–16)
BUN SERPL-MCNC: 12 MG/DL (ref 8–22)
CALCIUM SERPL-MCNC: 8.9 MG/DL (ref 8.5–10.5)
CHLORIDE SERPL-SCNC: 97 MMOL/L (ref 96–112)
CO2 SERPL-SCNC: 25 MMOL/L (ref 20–33)
CREAT SERPL-MCNC: 0.39 MG/DL (ref 0.5–1.4)
GFR SERPLBLD CREATININE-BSD FMLA CKD-EPI: 128 ML/MIN/1.73 M 2
GLUCOSE SERPL-MCNC: 151 MG/DL (ref 65–99)
MAGNESIUM SERPL-MCNC: 1.4 MG/DL (ref 1.5–2.5)
POTASSIUM SERPL-SCNC: 3.8 MMOL/L (ref 3.6–5.5)
SODIUM SERPL-SCNC: 131 MMOL/L (ref 135–145)

## 2024-12-20 PROCEDURE — 36415 COLL VENOUS BLD VENIPUNCTURE: CPT

## 2024-12-20 PROCEDURE — 83735 ASSAY OF MAGNESIUM: CPT

## 2024-12-20 PROCEDURE — A9270 NON-COVERED ITEM OR SERVICE: HCPCS | Mod: UD | Performed by: STUDENT IN AN ORGANIZED HEALTH CARE EDUCATION/TRAINING PROGRAM

## 2024-12-20 PROCEDURE — 700111 HCHG RX REV CODE 636 W/ 250 OVERRIDE (IP): Mod: JZ | Performed by: STUDENT IN AN ORGANIZED HEALTH CARE EDUCATION/TRAINING PROGRAM

## 2024-12-20 PROCEDURE — 770006 HCHG ROOM/CARE - MED/SURG/GYN SEMI*

## 2024-12-20 PROCEDURE — 99233 SBSQ HOSP IP/OBS HIGH 50: CPT | Performed by: STUDENT IN AN ORGANIZED HEALTH CARE EDUCATION/TRAINING PROGRAM

## 2024-12-20 PROCEDURE — 700111 HCHG RX REV CODE 636 W/ 250 OVERRIDE (IP): Mod: JZ,UD | Performed by: STUDENT IN AN ORGANIZED HEALTH CARE EDUCATION/TRAINING PROGRAM

## 2024-12-20 PROCEDURE — 96376 TX/PRO/DX INJ SAME DRUG ADON: CPT

## 2024-12-20 PROCEDURE — 700102 HCHG RX REV CODE 250 W/ 637 OVERRIDE(OP): Mod: UD | Performed by: STUDENT IN AN ORGANIZED HEALTH CARE EDUCATION/TRAINING PROGRAM

## 2024-12-20 PROCEDURE — 80048 BASIC METABOLIC PNL TOTAL CA: CPT

## 2024-12-20 RX ORDER — METHADONE HYDROCHLORIDE 5 MG/1
5 TABLET ORAL EVERY 8 HOURS
Status: DISCONTINUED | OUTPATIENT
Start: 2024-12-20 | End: 2024-12-20

## 2024-12-20 RX ORDER — DOCUSATE SODIUM 100 MG/1
100 CAPSULE, LIQUID FILLED ORAL 2 TIMES DAILY
Status: DISCONTINUED | OUTPATIENT
Start: 2024-12-20 | End: 2024-12-23 | Stop reason: HOSPADM

## 2024-12-20 RX ORDER — POLYETHYLENE GLYCOL 3350 17 G/17G
1 POWDER, FOR SOLUTION ORAL 2 TIMES DAILY
Status: DISCONTINUED | OUTPATIENT
Start: 2024-12-20 | End: 2024-12-23 | Stop reason: HOSPADM

## 2024-12-20 RX ORDER — MIRTAZAPINE 15 MG/1
7.5 TABLET, FILM COATED ORAL
Status: DISCONTINUED | OUTPATIENT
Start: 2024-12-20 | End: 2024-12-22

## 2024-12-20 RX ORDER — METHADONE HYDROCHLORIDE 5 MG/1
5 TABLET ORAL ONCE
Status: COMPLETED | OUTPATIENT
Start: 2024-12-20 | End: 2024-12-20

## 2024-12-20 RX ORDER — METHADONE HYDROCHLORIDE 10 MG/1
10 TABLET ORAL EVERY 8 HOURS
Status: DISCONTINUED | OUTPATIENT
Start: 2024-12-21 | End: 2024-12-21

## 2024-12-20 RX ADMIN — METHADONE HYDROCHLORIDE 5 MG: 5 TABLET ORAL at 21:31

## 2024-12-20 RX ADMIN — OXYCODONE HYDROCHLORIDE 10 MG: 10 TABLET ORAL at 20:14

## 2024-12-20 RX ADMIN — FOLIC ACID 1 MG: 1 TABLET ORAL at 10:02

## 2024-12-20 RX ADMIN — METHADONE HYDROCHLORIDE 5 MG: 5 TABLET ORAL at 23:05

## 2024-12-20 RX ADMIN — OMEPRAZOLE 20 MG: 20 CAPSULE, DELAYED RELEASE ORAL at 17:09

## 2024-12-20 RX ADMIN — DULOXETINE HYDROCHLORIDE 30 MG: 30 CAPSULE, DELAYED RELEASE ORAL at 10:02

## 2024-12-20 RX ADMIN — MIRTAZAPINE 7.5 MG: 15 TABLET, FILM COATED ORAL at 23:05

## 2024-12-20 RX ADMIN — SENNOSIDES AND DOCUSATE SODIUM 2 TABLET: 50; 8.6 TABLET ORAL at 17:07

## 2024-12-20 RX ADMIN — METHADONE HYDROCHLORIDE 5 MG: 5 TABLET ORAL at 17:07

## 2024-12-20 RX ADMIN — OXYCODONE HYDROCHLORIDE 10 MG: 10 TABLET ORAL at 17:07

## 2024-12-20 RX ADMIN — HYDROMORPHONE HYDROCHLORIDE 1 MG: 1 INJECTION, SOLUTION INTRAMUSCULAR; INTRAVENOUS; SUBCUTANEOUS at 01:25

## 2024-12-20 RX ADMIN — OXYCODONE HYDROCHLORIDE 10 MG: 10 TABLET ORAL at 08:17

## 2024-12-20 RX ADMIN — HYDROMORPHONE HYDROCHLORIDE 1 MG: 1 INJECTION, SOLUTION INTRAMUSCULAR; INTRAVENOUS; SUBCUTANEOUS at 05:17

## 2024-12-20 RX ADMIN — OMEPRAZOLE 20 MG: 20 CAPSULE, DELAYED RELEASE ORAL at 10:02

## 2024-12-20 RX ADMIN — HYDROMORPHONE HYDROCHLORIDE 1 MG: 1 INJECTION, SOLUTION INTRAMUSCULAR; INTRAVENOUS; SUBCUTANEOUS at 21:30

## 2024-12-20 RX ADMIN — THERA TABS 1 TABLET: TAB at 10:02

## 2024-12-20 RX ADMIN — HYDROCORTISONE: 1 CREAM TOPICAL at 17:07

## 2024-12-20 RX ADMIN — OXYCODONE HYDROCHLORIDE 10 MG: 10 TABLET ORAL at 03:41

## 2024-12-20 RX ADMIN — ONDANSETRON 4 MG: 2 INJECTION INTRAMUSCULAR; INTRAVENOUS at 04:29

## 2024-12-20 RX ADMIN — ONDANSETRON 4 MG: 2 INJECTION INTRAMUSCULAR; INTRAVENOUS at 21:03

## 2024-12-20 RX ADMIN — BUPRENORPHINE HCL 2 MG: 2 TABLET SUBLINGUAL at 10:02

## 2024-12-20 RX ADMIN — HYDROCORTISONE: 1 CREAM TOPICAL at 10:03

## 2024-12-20 RX ADMIN — ENOXAPARIN SODIUM 40 MG: 100 INJECTION SUBCUTANEOUS at 17:08

## 2024-12-20 ASSESSMENT — PAIN DESCRIPTION - PAIN TYPE
TYPE: ACUTE PAIN

## 2024-12-20 ASSESSMENT — ENCOUNTER SYMPTOMS
SHORTNESS OF BREATH: 0
NAUSEA: 0
CHILLS: 0
ABDOMINAL PAIN: 1
DIZZINESS: 0
VOMITING: 0
HEADACHES: 0
PALPITATIONS: 0
FEVER: 0
MYALGIAS: 0
COUGH: 0

## 2024-12-20 ASSESSMENT — COGNITIVE AND FUNCTIONAL STATUS - GENERAL
WALKING IN HOSPITAL ROOM: A LITTLE
HELP NEEDED FOR BATHING: A LITTLE
MOBILITY SCORE: 19
SUGGESTED CMS G CODE MODIFIER MOBILITY: CK
DAILY ACTIVITIY SCORE: 20
CLIMB 3 TO 5 STEPS WITH RAILING: A LITTLE
MOVING FROM LYING ON BACK TO SITTING ON SIDE OF FLAT BED: A LITTLE
STANDING UP FROM CHAIR USING ARMS: A LITTLE
DRESSING REGULAR UPPER BODY CLOTHING: A LITTLE
TOILETING: A LITTLE
SUGGESTED CMS G CODE MODIFIER DAILY ACTIVITY: CJ
DRESSING REGULAR LOWER BODY CLOTHING: A LITTLE
MOVING TO AND FROM BED TO CHAIR: A LITTLE

## 2024-12-20 ASSESSMENT — FIBROSIS 4 INDEX: FIB4 SCORE: 2.15

## 2024-12-20 NOTE — PROGRESS NOTES
[facility] Medicine Daily Progress Note    Date of Service  12/19/2024    Chief Complaint  Brenda Cartagena is a 39 y.o. female admitted 12/17/2024 with intractable and recurrent abdominal pain.    Hospital Course  Brenda Cartagena is a 39 y.o. female with cystic pancreatic mass concerning for malignancy, ETOH dependence in early remission, anxiety, who presented 12/17/2024 with intractable abdominal pain, pancreatic mass.     Patient recently admitted from 11/10 to 11/13/2024 with concerns of pancreatic mass.  She underwent EGD for concerns of GI bleeding found to have small gastric erosions and gastritis, grade a esophagitis, discharged on twice daily PPI and to avoid all NSAIDs as well as close outpatient GI follow-up for EUS/ERCP.  She has had EUS and ERCP performed outpatient, presented today to [facility] for intractable abdominal pain, nausea vomiting, progressively worse over the past 2 weeks, now severe.  She has been experiencing fatigue and malaise, inability to tolerate p.o. intake, occasional dyspnea with exertion. No fevers or chills headache or vision changes chest pain cough no dysuria.  Presented to [facility] for above symptoms and after discussion with ERP and hepatobiliary surgery team plan was to transfer patient to [facility] for further evaluation.     In [facility] ED she is afebrile pulse from 80s to 107, normal respiratory rate systolic blood pressure up to 130s pulse ox 93 to 100% on room air.  No leukocytosis stable anemia normal platelet count although I suspect she is hemoconcentrated CMP bicarb 18 glucose 133 lipase within normal limits as is renal and liver function.  ERP discussed with Dr. Byers from hepatobiliary surgery team, will consult on patient and they will see her in the morning.  Requested CT with pancreas protocol as well as requested GI consultation in the morning.  Subsequently for to hospitalist for admission.       Interval Problem Update  12/18/2024  Patient was seen and  examined on the neurology floor on 2 separate occasions.  Continues to complain of severe abdominal pain requiring IV Dilaudid and IV morphine.    I discussed the case with the gastroenterologist on-call, who did not feel patient required any further workup such as EGD.  Patient was at [facility] a couple of weeks ago.  Patient was recently evaluated by Dr. Dawson of [facility], unable to remove pancreatic stone.  Referred to hepatobiliary surgery, Dr. Byers.    I discussed with Dr. Byers of hepatobiliary surgery.  Recommending pain control and cessation of alcohol, which the patient states that she has already done.  Establishment with pain management doctor for outpatient follow-up at the clinic.  Patient may be a candidate for Puestow procedure to relieve patient's chronic pancreatitis.  Need cessation of alcohol for 6 months prior to undergoing procedure.    Discussed with patient about starting methadone versus buprenorphine and she is agreeable to starting buprenorphine with duloxetine to increase pain tolerance.  I have ordered oxycodone as well as IV Dilaudid as needed for breakthrough pain.    12/19/2024  Patient was seen and examined on the neurology floor on 2 separate occasions.  Hepatobiliary surgery recommending outpatient attempt for pancreatic stent.  Outpatient follow-up for surgical evaluation.  Patient is willing to try buprenorphine again for pain control.  Made her feel funny last night.  Continue oxycodone.  Continue to require IV Dilaudid for breakthrough pain.  Diet resumed      I have discussed this patient's plan of care and discharge plan at IDT rounds today with Case Management, Nursing, Nursing leadership, and other members of the IDT team.    Consultants/Specialty  Hepatobiliary surgery    Code Status  Full Code    Disposition  The patient is not medically cleared for discharge to home or a post-acute facility.  Anticipate discharge to: home with close outpatient  follow-up    I have placed the appropriate orders for post-discharge needs.    Review of Systems  Review of Systems   Constitutional:  Negative for chills and fever.   Respiratory:  Negative for cough and shortness of breath.    Cardiovascular:  Negative for chest pain and palpitations.   Gastrointestinal:  Positive for abdominal pain. Negative for nausea and vomiting.   Musculoskeletal:  Negative for joint pain and myalgias.   Neurological:  Negative for dizziness and headaches.        Physical Exam  Temp:  [36.5 °C (97.7 °F)-37.1 °C (98.8 °F)] 36.6 °C (97.9 °F)  Pulse:  [106-117] 117  Resp:  [16-20] 16  BP: (111-142)/(77-98) 111/77  SpO2:  [91 %-93 %] 91 %    Physical Exam  Vitals and nursing note reviewed.   Constitutional:       Appearance: She is normal weight. She is ill-appearing. She is not diaphoretic.   HENT:      Head: Normocephalic and atraumatic.      Mouth/Throat:      Mouth: Mucous membranes are moist.      Pharynx: Oropharynx is clear. No oropharyngeal exudate.   Eyes:      General:         Right eye: No discharge.         Left eye: No discharge.      Conjunctiva/sclera: Conjunctivae normal.      Pupils: Pupils are equal, round, and reactive to light.   Cardiovascular:      Rate and Rhythm: Normal rate and regular rhythm.      Pulses: Normal pulses.      Heart sounds: Normal heart sounds. No murmur heard.  Pulmonary:      Effort: Pulmonary effort is normal. No respiratory distress.      Breath sounds: Normal breath sounds.   Abdominal:      General: Abdomen is flat. Bowel sounds are normal. There is no distension.      Palpations: Abdomen is soft.      Tenderness: There is abdominal tenderness. There is no guarding or rebound.   Musculoskeletal:      Cervical back: Neck supple. No tenderness.      Right lower leg: No edema.      Left lower leg: No edema.   Skin:     Coloration: Skin is pale.   Neurological:      Mental Status: She is alert and oriented to person, place, and time.      Motor: No  weakness.   Psychiatric:         Attention and Perception: Attention normal.         Mood and Affect: Mood normal.         Fluids    Intake/Output Summary (Last 24 hours) at 12/19/2024 1925  Last data filed at 12/19/2024 0000  Gross per 24 hour   Intake 636 ml   Output --   Net 636 ml        Laboratory  Recent Labs     12/17/24 2036 12/19/24 0237   WBC 5.9 6.6   RBC 3.20* 3.28*   HEMOGLOBIN 9.7* 9.9*   HEMATOCRIT 28.7* 30.1*   MCV 89.7 91.8   MCH 30.3 30.2   MCHC 33.8 32.9   RDW 48.7 49.7   PLATELETCT 201 195   MPV 10.9 11.3     Recent Labs     12/17/24 2036 12/19/24 0237   SODIUM 137 136   POTASSIUM 3.6 3.1*   CHLORIDE 105 101   CO2 18* 23   GLUCOSE 133* 151*   BUN 9 8   CREATININE 0.28* 0.34*   CALCIUM 8.5 8.6                   Imaging  CT-PANCREAS AND ABDOMEN WITH & W/O   Final Result         1.  Heterogeneous lesion in the pancreatic head with dilatation the pancreatic duct concerning for pancreatic mass lesion, recommend follow-up MRI pancreas for further characterization.   2.  Multiple intermediate density soft tissue masses along the greater curvature of the stomach, appearance most compatible with underlying neoplastic disease, component of hemorrhage is not excluded given density.   3.  Hepatomegaly           Assessment/Plan  * Cystic mass of pancreas- (present on admission)  Assessment & Plan  Hepatobiliary surgery consulted by ERP, will need to consult GI in the morning.  Reportedly had EUS/ERCP performed, will need to obtain results of path.    12/18/2024  I discussed the case with the gastroenterologist on-call, who did not feel patient required any further workup such as EGD.  Referred to hepatobiliary surgery, Dr. Byers.    I discussed with Dr. Byers of hepatobiliary surgery.  Recommending pain control and cessation of alcohol, which the patient states that she has already done.  Establishment with pain management doctor for outpatient follow-up at the clinic.  Patient may be a candidate  for Puestow procedure to relieve patient's chronic pancreatitis.  Need cessation of alcohol for 6 months prior to undergoing procedure.    12/19/2024  Plan for outpatient pancreatic stent with Dr. Dawson    IPMN (intraductal papillary mucinous neoplasm)- (present on admission)  Assessment & Plan  12/18/2024  Concern for this as per discussion with Dr. Byers of hepatobiliary surgery.  Outpatient follow-up at her clinic.    Alcohol-induced chronic pancreatitis (HCC)- (present on admission)  Assessment & Plan  12/18/2024  Due to alcohol.  Patient may be a candidate for Puestow procedure to relieve patient's chronic pancreatitis.  Need cessation of alcohol for 6 months prior to undergoing procedure.    Plan for outpatient follow-up with hepatobiliary surgery.    Intractable abdominal pain- (present on admission)  Assessment & Plan  Analgesia as needed, adjust regimen.  Antiemetics as needed    12/18/2024  Discussed with patient about starting methadone versus buprenorphine and she is agreeable to starting buprenorphine with duloxetine to increase pain tolerance.  I have ordered oxycodone as well as IV Dilaudid as needed for breakthrough pain.  Outpatient referral for pain management provider  Continuous pulse oximetry monitoring to monitor for hypoxia and respiratory depression while receiving IV narcotic medications.      12/19/2024  Patient is willing to try buprenorphine again for pain control.  Made her feel funny last night.  Continue oxycodone.  Continue to require IV Dilaudid for breakthrough pain.  Continuous pulse oximetry monitoring to monitor for hypoxia and respiratory depression while receiving IV narcotic medications.      Constipation due to opioid therapy- (present on admission)  Assessment & Plan  12/18/2024  Start MiraLAX and docusate twice daily.    12/19/2024  No bowel movement yet.  Continue MiraLAX and docusate twice daily.    Pancreatic duct stones- (present on admission)  Assessment &  Plan  12/18/2024  Patient was at [facility] a couple of weeks ago.  Patient was recently evaluated by Dr. Dawson of [facility], unable to remove pancreatic stone.  Referred to hepatobiliary surgery, Dr. Byers.    Pain management with outpatient follow-up for consideration of surgery if remaining sober.    Eczema  Assessment & Plan  Hydrocortisone cream    Anxiety  Assessment & Plan  Ativan prn for anxiety    Gastric lesion  Assessment & Plan  Noted on admission CT, multiple intermediate density soft tissue masses along the greater curvature of the stomach, appearance most compatible with underlying neoplastic disease, component of hemorrhage is not excluded given density.    GI consult in a.m., EGD last month for concerns of GI bleeding showed small gastric erosions and gastritis, grade a esophagitis.  Patient reports intermittent black stools for the past couple of weeks.  She assumed it was something she was eating.  Hemoglobin on admission 9.7 although she is quite dehydrated, I suspect it is lower, twice daily PPI ordered, monitor for signs of bleeding, transfuse hemoglobin less than 7.  Given recent EGD as well as CT abdomen pelvis with and without contrast in June of this year that did not note these masses I suspect they are more likely from hemorrhage however will need to discuss with hepatobiliary and GI team.    12/18/2024  No further EGD indicated at this time per discussion with gastroenterologist on-call.  Continue omeprazole 20 mg by mouth twice daily.    Anemia- (present on admission)  Assessment & Plan  Patient reports intermittent black stool, suspect slow GI bleed, recent EGD showed small gastric erosions and gastritis, grade a esophagitis.  Continue PPI twice daily.  GI consult in a.m.    12/18/2024  Suspect due to chronic blood loss.  In setting of esophagitis alcohol related.  Work-up with ferritin, iron panel, reticulocyte panel, B12, folate, LDH, TSH     Per discussion with  gastroenterologist on-call, deferring to hepatobiliary surgery.  Hemoglobin remaining stable although patient is intermittently reporting melena.  Continue omeprazole 20 mg twice daily.    Uncomplicated alcohol dependence (HCC)- (present on admission)  Assessment & Plan  Per patient in early remission, monitor for signs of withdrawal, none on my evaluation.         VTE prophylaxis:    enoxaparin ppx      I have performed a physical exam and reviewed and updated ROS and Plan today (12/19/2024). In review of yesterday's note (12/18/2024), there are no changes except as documented above.

## 2024-12-20 NOTE — CARE PLAN
The patient is Stable - Low risk of patient condition declining or worsening    Shift Goals  Clinical Goals: Pain Management  Patient Goals: Pain Management  Family Goals: IRINEO    Progress made toward(s) clinical / shift goals:    Problem: Pain - Standard  Goal: Alleviation of pain or a reduction in pain to the patient’s comfort goal  Outcome: Progressing  Frequent pain reassessments performed throughout shift using the 0-10 Numerical Pain Scale. Patient medicated per MAR using available PRN pain medication. Non-pharmacological available and offered to patient.       Problem: Mobility  Goal: Patient's capacity to carry out activities will improve  Outcome: Progressing  Patient calls appropriately when requiring assistance to ambulate to the restroom to void. Patient is steady on feet, requiring stand by assistance from staff. Treaded socks in use when out of bed and ambulating. Bed locked and in lowest position. Call light and personal belongings within reach.    Patient is not progressing towards the following goals:

## 2024-12-20 NOTE — CARE PLAN
The patient is Stable - Low risk of patient condition declining or worsening    Shift Goals  Clinical Goals: pain control  Patient Goals: updates  Family Goals: updates    Progress made toward(s) clinical / shift goals:    Problem: Pain - Standard  Goal: Alleviation of pain or a reduction in pain to the patient’s comfort goal  Description: Target End Date:  Prior to discharge or change in level of care    Document on Vitals flowsheet    1.  Document pain using the appropriate pain scale per order or unit policy  2.  Educate and implement non-pharmacologic comfort measures (i.e. relaxation, distraction, massage, cold/heat therapy, etc.)  3.  Pain management medications as ordered  4.  Reassess pain after pain med administration per policy  5.  If opiods administered assess patient's response to pain medication is appropriate per POSS sedation scale  6.  Follow pain management plan developed in collaboration with patient and interdisciplinary team (including palliative care or pain specialists if applicable)  Outcome: Progressing     Problem: Knowledge Deficit - Standard  Goal: Patient and family/care givers will demonstrate understanding of plan of care, disease process/condition, diagnostic tests and medications  Description: Target End Date:  1-3 days or as soon as patient condition allows    Document in Patient Education    1.  Patient and family/caregiver oriented to unit, equipment, visitation policy and means for communicating concern  2.  Complete/review Learning Assessment  3.  Assess knowledge level of disease process/condition, treatment plan, diagnostic tests and medications  4.  Explain disease process/condition, treatment plan, diagnostic tests and medications  Outcome: Progressing       Patient is not progressing towards the following goals:

## 2024-12-21 LAB
ALBUMIN SERPL BCP-MCNC: 3.3 G/DL (ref 3.2–4.9)
ALBUMIN/GLOB SERPL: 1 G/DL
ALP SERPL-CCNC: 78 U/L (ref 30–99)
ALT SERPL-CCNC: 18 U/L (ref 2–50)
ANION GAP SERPL CALC-SCNC: 10 MMOL/L (ref 7–16)
AST SERPL-CCNC: 44 U/L (ref 12–45)
BASOPHILS # BLD AUTO: 0.4 % (ref 0–1.8)
BASOPHILS # BLD: 0.03 K/UL (ref 0–0.12)
BILIRUB SERPL-MCNC: 0.9 MG/DL (ref 0.1–1.5)
BUN SERPL-MCNC: 13 MG/DL (ref 8–22)
CALCIUM ALBUM COR SERPL-MCNC: 9.3 MG/DL (ref 8.5–10.5)
CALCIUM SERPL-MCNC: 8.7 MG/DL (ref 8.5–10.5)
CHLORIDE SERPL-SCNC: 97 MMOL/L (ref 96–112)
CO2 SERPL-SCNC: 25 MMOL/L (ref 20–33)
CREAT SERPL-MCNC: 0.38 MG/DL (ref 0.5–1.4)
EOSINOPHIL # BLD AUTO: 0.06 K/UL (ref 0–0.51)
EOSINOPHIL NFR BLD: 0.8 % (ref 0–6.9)
ERYTHROCYTE [DISTWIDTH] IN BLOOD BY AUTOMATED COUNT: 47.7 FL (ref 35.9–50)
GFR SERPLBLD CREATININE-BSD FMLA CKD-EPI: 128 ML/MIN/1.73 M 2
GLOBULIN SER CALC-MCNC: 3.3 G/DL (ref 1.9–3.5)
GLUCOSE SERPL-MCNC: 157 MG/DL (ref 65–99)
HCT VFR BLD AUTO: 27.7 % (ref 37–47)
HGB BLD-MCNC: 9.1 G/DL (ref 12–16)
IMM GRANULOCYTES # BLD AUTO: 0.04 K/UL (ref 0–0.11)
IMM GRANULOCYTES NFR BLD AUTO: 0.5 % (ref 0–0.9)
LYMPHOCYTES # BLD AUTO: 1.61 K/UL (ref 1–4.8)
LYMPHOCYTES NFR BLD: 21.6 % (ref 22–41)
MAGNESIUM SERPL-MCNC: 1.5 MG/DL (ref 1.5–2.5)
MCH RBC QN AUTO: 30.4 PG (ref 27–33)
MCHC RBC AUTO-ENTMCNC: 32.9 G/DL (ref 32.2–35.5)
MCV RBC AUTO: 92.6 FL (ref 81.4–97.8)
MONOCYTES # BLD AUTO: 0.82 K/UL (ref 0–0.85)
MONOCYTES NFR BLD AUTO: 11 % (ref 0–13.4)
NEUTROPHILS # BLD AUTO: 4.9 K/UL (ref 1.82–7.42)
NEUTROPHILS NFR BLD: 65.7 % (ref 44–72)
NRBC # BLD AUTO: 0 K/UL
NRBC BLD-RTO: 0 /100 WBC (ref 0–0.2)
PHOSPHATE SERPL-MCNC: 3.4 MG/DL (ref 2.5–4.5)
PLATELET # BLD AUTO: 181 K/UL (ref 164–446)
PMV BLD AUTO: 11.2 FL (ref 9–12.9)
POTASSIUM SERPL-SCNC: 3.4 MMOL/L (ref 3.6–5.5)
PROT SERPL-MCNC: 6.6 G/DL (ref 6–8.2)
RBC # BLD AUTO: 2.99 M/UL (ref 4.2–5.4)
SODIUM SERPL-SCNC: 132 MMOL/L (ref 135–145)
WBC # BLD AUTO: 7.5 K/UL (ref 4.8–10.8)

## 2024-12-21 PROCEDURE — 83735 ASSAY OF MAGNESIUM: CPT

## 2024-12-21 PROCEDURE — 85025 COMPLETE CBC W/AUTO DIFF WBC: CPT

## 2024-12-21 PROCEDURE — 700102 HCHG RX REV CODE 250 W/ 637 OVERRIDE(OP): Performed by: STUDENT IN AN ORGANIZED HEALTH CARE EDUCATION/TRAINING PROGRAM

## 2024-12-21 PROCEDURE — 700111 HCHG RX REV CODE 636 W/ 250 OVERRIDE (IP): Mod: JZ | Performed by: STUDENT IN AN ORGANIZED HEALTH CARE EDUCATION/TRAINING PROGRAM

## 2024-12-21 PROCEDURE — 99233 SBSQ HOSP IP/OBS HIGH 50: CPT | Performed by: STUDENT IN AN ORGANIZED HEALTH CARE EDUCATION/TRAINING PROGRAM

## 2024-12-21 PROCEDURE — 80053 COMPREHEN METABOLIC PANEL: CPT

## 2024-12-21 PROCEDURE — 770006 HCHG ROOM/CARE - MED/SURG/GYN SEMI*

## 2024-12-21 PROCEDURE — 36415 COLL VENOUS BLD VENIPUNCTURE: CPT

## 2024-12-21 PROCEDURE — 84100 ASSAY OF PHOSPHORUS: CPT

## 2024-12-21 PROCEDURE — A9270 NON-COVERED ITEM OR SERVICE: HCPCS | Performed by: STUDENT IN AN ORGANIZED HEALTH CARE EDUCATION/TRAINING PROGRAM

## 2024-12-21 RX ORDER — DULOXETIN HYDROCHLORIDE 60 MG/1
60 CAPSULE, DELAYED RELEASE ORAL DAILY
Status: DISCONTINUED | OUTPATIENT
Start: 2024-12-22 | End: 2024-12-23 | Stop reason: HOSPADM

## 2024-12-21 RX ORDER — DULOXETIN HYDROCHLORIDE 30 MG/1
30 CAPSULE, DELAYED RELEASE ORAL ONCE
Status: COMPLETED | OUTPATIENT
Start: 2024-12-21 | End: 2024-12-21

## 2024-12-21 RX ADMIN — OMEPRAZOLE 20 MG: 20 CAPSULE, DELAYED RELEASE ORAL at 16:28

## 2024-12-21 RX ADMIN — OXYCODONE HYDROCHLORIDE 10 MG: 10 TABLET ORAL at 22:37

## 2024-12-21 RX ADMIN — METHADONE HYDROCHLORIDE 15 MG: 10 TABLET ORAL at 22:37

## 2024-12-21 RX ADMIN — HYDROMORPHONE HYDROCHLORIDE 1 MG: 1 INJECTION, SOLUTION INTRAMUSCULAR; INTRAVENOUS; SUBCUTANEOUS at 20:43

## 2024-12-21 RX ADMIN — OMEPRAZOLE 20 MG: 20 CAPSULE, DELAYED RELEASE ORAL at 05:34

## 2024-12-21 RX ADMIN — DULOXETINE HYDROCHLORIDE 30 MG: 30 CAPSULE, DELAYED RELEASE ORAL at 20:43

## 2024-12-21 RX ADMIN — METHADONE HYDROCHLORIDE 10 MG: 10 TABLET ORAL at 13:07

## 2024-12-21 RX ADMIN — HYDROCORTISONE: 1 CREAM TOPICAL at 05:37

## 2024-12-21 RX ADMIN — OXYCODONE HYDROCHLORIDE 10 MG: 10 TABLET ORAL at 15:15

## 2024-12-21 RX ADMIN — METHADONE HYDROCHLORIDE 10 MG: 10 TABLET ORAL at 05:34

## 2024-12-21 RX ADMIN — POLYETHYLENE GLYCOL 3350 1 PACKET: 17 POWDER, FOR SOLUTION ORAL at 16:28

## 2024-12-21 RX ADMIN — HYDROMORPHONE HYDROCHLORIDE 1 MG: 1 INJECTION, SOLUTION INTRAMUSCULAR; INTRAVENOUS; SUBCUTANEOUS at 16:30

## 2024-12-21 RX ADMIN — DOCUSATE SODIUM 100 MG: 100 CAPSULE, LIQUID FILLED ORAL at 05:33

## 2024-12-21 RX ADMIN — ONDANSETRON 4 MG: 2 INJECTION INTRAMUSCULAR; INTRAVENOUS at 22:37

## 2024-12-21 RX ADMIN — OXYCODONE HYDROCHLORIDE 10 MG: 10 TABLET ORAL at 08:57

## 2024-12-21 RX ADMIN — HYDROCORTISONE: 1 CREAM TOPICAL at 16:28

## 2024-12-21 RX ADMIN — SENNOSIDES AND DOCUSATE SODIUM 2 TABLET: 50; 8.6 TABLET ORAL at 16:28

## 2024-12-21 RX ADMIN — FOLIC ACID 1 MG: 1 TABLET ORAL at 05:34

## 2024-12-21 RX ADMIN — DULOXETINE HYDROCHLORIDE 30 MG: 30 CAPSULE, DELAYED RELEASE ORAL at 05:36

## 2024-12-21 RX ADMIN — OXYCODONE HYDROCHLORIDE 10 MG: 10 TABLET ORAL at 02:44

## 2024-12-21 RX ADMIN — HYDROMORPHONE HYDROCHLORIDE 1 MG: 1 INJECTION, SOLUTION INTRAMUSCULAR; INTRAVENOUS; SUBCUTANEOUS at 23:56

## 2024-12-21 RX ADMIN — OXYCODONE HYDROCHLORIDE 10 MG: 10 TABLET ORAL at 19:32

## 2024-12-21 RX ADMIN — HYDROMORPHONE HYDROCHLORIDE 1 MG: 1 INJECTION, SOLUTION INTRAMUSCULAR; INTRAVENOUS; SUBCUTANEOUS at 04:36

## 2024-12-21 RX ADMIN — MIRTAZAPINE 7.5 MG: 15 TABLET, FILM COATED ORAL at 20:43

## 2024-12-21 RX ADMIN — DOCUSATE SODIUM 100 MG: 100 CAPSULE, LIQUID FILLED ORAL at 16:28

## 2024-12-21 RX ADMIN — THERA TABS 1 TABLET: TAB at 05:34

## 2024-12-21 RX ADMIN — POLYETHYLENE GLYCOL 3350 1 PACKET: 17 POWDER, FOR SOLUTION ORAL at 05:37

## 2024-12-21 RX ADMIN — ENOXAPARIN SODIUM 40 MG: 100 INJECTION SUBCUTANEOUS at 16:27

## 2024-12-21 ASSESSMENT — FIBROSIS 4 INDEX: FIB4 SCORE: 2.15

## 2024-12-21 ASSESSMENT — ENCOUNTER SYMPTOMS
DIZZINESS: 0
HEADACHES: 0
PALPITATIONS: 0
VOMITING: 0
COUGH: 0
SHORTNESS OF BREATH: 0
ABDOMINAL PAIN: 1
FEVER: 0
MYALGIAS: 0
NAUSEA: 0
CHILLS: 0

## 2024-12-21 ASSESSMENT — PAIN DESCRIPTION - PAIN TYPE
TYPE: ACUTE PAIN

## 2024-12-21 NOTE — PROGRESS NOTES
[facility] Medicine Daily Progress Note    Date of Service  12/20/2024    Chief Complaint  Brenda Cartagena is a 39 y.o. female admitted 12/17/2024 with intractable and recurrent abdominal pain.    Hospital Course  Brenda Cartagena is a 39 y.o. female with cystic pancreatic mass concerning for malignancy, ETOH dependence in early remission, anxiety, who presented 12/17/2024 with intractable abdominal pain, pancreatic mass.     Patient recently admitted from 11/10 to 11/13/2024 with concerns of pancreatic mass.  She underwent EGD for concerns of GI bleeding found to have small gastric erosions and gastritis, grade a esophagitis, discharged on twice daily PPI and to avoid all NSAIDs as well as close outpatient GI follow-up for EUS/ERCP.  She has had EUS and ERCP performed outpatient, presented today to [facility] for intractable abdominal pain, nausea vomiting, progressively worse over the past 2 weeks, now severe.  She has been experiencing fatigue and malaise, inability to tolerate p.o. intake, occasional dyspnea with exertion. No fevers or chills headache or vision changes chest pain cough no dysuria.  Presented to [facility] for above symptoms and after discussion with ERP and hepatobiliary surgery team plan was to transfer patient to [facility] for further evaluation.     In [facility] ED she is afebrile pulse from 80s to 107, normal respiratory rate systolic blood pressure up to 130s pulse ox 93 to 100% on room air.  No leukocytosis stable anemia normal platelet count although I suspect she is hemoconcentrated CMP bicarb 18 glucose 133 lipase within normal limits as is renal and liver function.  ERP discussed with Dr. Byers from hepatobiliary surgery team, will consult on patient and they will see her in the morning.  Requested CT with pancreas protocol as well as requested GI consultation in the morning.  Subsequently for to hospitalist for admission.       Interval Problem Update  12/18/2024  Patient was seen and  examined on the neurology floor on 2 separate occasions.  Continues to complain of severe abdominal pain requiring IV Dilaudid and IV morphine.    I discussed the case with the gastroenterologist on-call, who did not feel patient required any further workup such as EGD.  Patient was at [facility] a couple of weeks ago.  Patient was recently evaluated by Dr. Dawson of [facility], unable to remove pancreatic stone.  Referred to hepatobiliary surgery, Dr. Byers.    I discussed with Dr. Byers of hepatobiliary surgery.  Recommending pain control and cessation of alcohol, which the patient states that she has already done.  Establishment with pain management doctor for outpatient follow-up at the clinic.  Patient may be a candidate for Puestow procedure to relieve patient's chronic pancreatitis.  Need cessation of alcohol for 6 months prior to undergoing procedure.    Discussed with patient about starting methadone versus buprenorphine and she is agreeable to starting buprenorphine with duloxetine to increase pain tolerance.  I have ordered oxycodone as well as IV Dilaudid as needed for breakthrough pain.    12/19/2024  Patient was seen and examined on the neurology floor on 2 separate occasions.  Hepatobiliary surgery recommending outpatient attempt for pancreatic stent.  Outpatient follow-up for surgical evaluation.  Patient is willing to try buprenorphine again for pain control.  Made her feel funny last night.  Continue oxycodone.  Continue to require IV Dilaudid for breakthrough pain.  Diet resumed    12/20/2024  Patient reports hallucinations with reinitiation of buprenorphine.  She is willing to try methadone.  Ongoing need for IV Dilaudid for breakthrough pain.  Meeting criteria for severe protein calorie nutrition.  Starting protein shake supplementation.  Prolonged discussion regarding outpatient continue workup and follow-up for consideration of pancreatic stent and also Puestow procedure.    Patient  is being enrolled in inpatient status due to ongoing need for pain control with oral pain medications with continuous cardiac and pulse oximetry monitoring during titration of narcotic pain medications.  She is continue to require IV Dilaudid for pain control.  Transitioning from buprenorphine to methadone.  She is unable to have adequate oral intake due to her ongoing pain.      I have discussed this patient's plan of care and discharge plan at IDT rounds today with Case Management, Nursing, Nursing leadership, and other members of the IDT team.    Consultants/Specialty  Hepatobiliary surgery    Code Status  Full Code    Disposition  The patient is not medically cleared for discharge to home or a post-acute facility.  Anticipate discharge to: home with close outpatient follow-up    I have placed the appropriate orders for post-discharge needs.    Review of Systems  Review of Systems   Constitutional:  Negative for chills and fever.   Respiratory:  Negative for cough and shortness of breath.    Cardiovascular:  Negative for chest pain and palpitations.   Gastrointestinal:  Positive for abdominal pain. Negative for nausea and vomiting.   Musculoskeletal:  Negative for joint pain and myalgias.   Neurological:  Negative for dizziness and headaches.        Physical Exam  Temp:  [36.4 °C (97.5 °F)-36.7 °C (98.1 °F)] 36.4 °C (97.5 °F)  Pulse:  [107-119] 112  Resp:  [16-20] 18  BP: (105-122)/(70-88) 105/70  SpO2:  [91 %-94 %] 94 %    Physical Exam  Vitals and nursing note reviewed.   Constitutional:       Appearance: She is normal weight. She is ill-appearing. She is not diaphoretic.   HENT:      Head: Normocephalic and atraumatic.      Mouth/Throat:      Mouth: Mucous membranes are moist.      Pharynx: Oropharynx is clear. No oropharyngeal exudate.   Eyes:      General:         Right eye: No discharge.         Left eye: No discharge.      Conjunctiva/sclera: Conjunctivae normal.      Pupils: Pupils are equal, round, and  reactive to light.   Cardiovascular:      Rate and Rhythm: Normal rate and regular rhythm.      Pulses: Normal pulses.      Heart sounds: Normal heart sounds. No murmur heard.  Pulmonary:      Effort: Pulmonary effort is normal. No respiratory distress.      Breath sounds: Normal breath sounds.   Abdominal:      General: Abdomen is flat. Bowel sounds are normal. There is no distension.      Palpations: Abdomen is soft.      Tenderness: There is abdominal tenderness. There is no guarding or rebound.   Musculoskeletal:      Cervical back: Neck supple. No tenderness.      Right lower leg: No edema.      Left lower leg: No edema.   Skin:     Coloration: Skin is pale.   Neurological:      Mental Status: She is alert and oriented to person, place, and time.      Motor: No weakness.   Psychiatric:         Attention and Perception: Attention normal.         Mood and Affect: Mood normal.         Fluids    Intake/Output Summary (Last 24 hours) at 12/20/2024 1829  Last data filed at 12/20/2024 1700  Gross per 24 hour   Intake 830 ml   Output --   Net 830 ml        Laboratory  Recent Labs     12/17/24 2036 12/19/24  0237   WBC 5.9 6.6   RBC 3.20* 3.28*   HEMOGLOBIN 9.7* 9.9*   HEMATOCRIT 28.7* 30.1*   MCV 89.7 91.8   MCH 30.3 30.2   MCHC 33.8 32.9   RDW 48.7 49.7   PLATELETCT 201 195   MPV 10.9 11.3     Recent Labs     12/17/24 2036 12/19/24  0237 12/20/24  0918   SODIUM 137 136 131*   POTASSIUM 3.6 3.1* 3.8   CHLORIDE 105 101 97   CO2 18* 23 25   GLUCOSE 133* 151* 151*   BUN 9 8 12   CREATININE 0.28* 0.34* 0.39*   CALCIUM 8.5 8.6 8.9                   Imaging  CT-PANCREAS AND ABDOMEN WITH & W/O   Final Result         1.  Heterogeneous lesion in the pancreatic head with dilatation the pancreatic duct concerning for pancreatic mass lesion, recommend follow-up MRI pancreas for further characterization.   2.  Multiple intermediate density soft tissue masses along the greater curvature of the stomach, appearance most compatible  with underlying neoplastic disease, component of hemorrhage is not excluded given density.   3.  Hepatomegaly           Assessment/Plan  * Cystic mass of pancreas- (present on admission)  Assessment & Plan  Hepatobiliary surgery consulted by ERP, will need to consult GI in the morning.  Reportedly had EUS/ERCP performed, will need to obtain results of path.    12/18/2024  I discussed the case with the gastroenterologist on-call, who did not feel patient required any further workup such as EGD.  Referred to hepatobiliary surgery, Dr. Byers.    I discussed with Dr. Byers of hepatobiliary surgery.  Recommending pain control and cessation of alcohol, which the patient states that she has already done.  Establishment with pain management doctor for outpatient follow-up at the clinic.  Patient may be a candidate for Puestow procedure to relieve patient's chronic pancreatitis.  Need cessation of alcohol for 6 months prior to undergoing procedure.    12/19/2024  Plan for outpatient pancreatic stent with Dr. Dawson    12/20/2024  Prolonged discussion with the patient and the  regarding outpatient planned procedures.    IPMN (intraductal papillary mucinous neoplasm)- (present on admission)  Assessment & Plan  12/18/2024  Concern for this as per discussion with Dr. Byers of hepatobiliary surgery.  Outpatient follow-up at her clinic.    Alcohol-induced chronic pancreatitis (HCC)- (present on admission)  Assessment & Plan  12/18/2024  Due to alcohol.  Patient may be a candidate for Puestow procedure to relieve patient's chronic pancreatitis.  Need cessation of alcohol for 6 months prior to undergoing procedure.    Plan for outpatient follow-up with hepatobiliary surgery.    Intractable abdominal pain- (present on admission)  Assessment & Plan  Analgesia as needed, adjust regimen.  Antiemetics as needed    12/18/2024  Discussed with patient about starting methadone versus buprenorphine and she is agreeable to  "starting buprenorphine with duloxetine to increase pain tolerance.  I have ordered oxycodone as well as IV Dilaudid as needed for breakthrough pain.  Outpatient referral for pain management provider  Continuous pulse oximetry monitoring to monitor for hypoxia and respiratory depression while receiving IV narcotic medications.      12/19/2024  Patient is willing to try buprenorphine again for pain control.  Made her feel funny last night.  Continue oxycodone.  Continue to require IV Dilaudid for breakthrough pain.  Continuous pulse oximetry monitoring to monitor for hypoxia and respiratory depression while receiving IV narcotic medications.    12/20/2024  Patient appears to be having withdrawal symptoms with buprenorphine.  She is going to try methadone for pain control.  She has required IV Dilaudid twice today for breakthrough pain.    Start methadone 5 mg every 8 hours.  Monitor QTc prolongation.  Continuous cardiac monitoring.  Continue IV Dilaudid as needed for breakthrough pain.  Continuous pulse oximetry monitoring to monitor for hypoxia and respiratory depression while receiving IV narcotic medications.          Severe protein-calorie malnutrition (HCC)- (present on admission)  Assessment & Plan  12/20/2024  Per RD:  \"Inadequate Oral Intake related to nausea and vomiting as evidenced by PO intake <50%.       Severe Malnutrition in context of Acute Illness or Injury related to nausea and vomiting in per with history of pancreatitis as evidenced by 11.3% weight loss in just over one month and reported poor oral intake <50% of needs x 2 weeks.  \"    Discussed with    Audelia Nevarez R.D.    Starting protein shakes    Constipation due to opioid therapy- (present on admission)  Assessment & Plan  12/18/2024  Start MiraLAX and docusate twice daily.    12/19/2024  No bowel movement yet.  Continue MiraLAX and docusate twice daily.    Pancreatic duct stones- (present on admission)  Assessment & " Plan  12/18/2024  Patient was at [facility] a couple of weeks ago.  Patient was recently evaluated by Dr. Dawson of [facility], unable to remove pancreatic stone.  Referred to hepatobiliary surgery, Dr. Byers.    Pain management with outpatient follow-up for consideration of surgery if remaining sober.    Eczema  Assessment & Plan  Hydrocortisone cream    Anxiety  Assessment & Plan  Ativan prn for anxiety    Gastric lesion  Assessment & Plan  Noted on admission CT, multiple intermediate density soft tissue masses along the greater curvature of the stomach, appearance most compatible with underlying neoplastic disease, component of hemorrhage is not excluded given density.    GI consult in a.m., EGD last month for concerns of GI bleeding showed small gastric erosions and gastritis, grade a esophagitis.  Patient reports intermittent black stools for the past couple of weeks.  She assumed it was something she was eating.  Hemoglobin on admission 9.7 although she is quite dehydrated, I suspect it is lower, twice daily PPI ordered, monitor for signs of bleeding, transfuse hemoglobin less than 7.  Given recent EGD as well as CT abdomen pelvis with and without contrast in June of this year that did not note these masses I suspect they are more likely from hemorrhage however will need to discuss with hepatobiliary and GI team.    12/18/2024  No further EGD indicated at this time per discussion with gastroenterologist on-call.  Continue omeprazole 20 mg by mouth twice daily.    Anemia- (present on admission)  Assessment & Plan  Patient reports intermittent black stool, suspect slow GI bleed, recent EGD showed small gastric erosions and gastritis, grade a esophagitis.  Continue PPI twice daily.  GI consult in a.m.    12/18/2024  Suspect due to chronic blood loss.  In setting of esophagitis alcohol related.  Work-up with ferritin, iron panel, reticulocyte panel, B12, folate, LDH, TSH     Per discussion with  gastroenterologist on-call, deferring to hepatobiliary surgery.  Hemoglobin remaining stable although patient is intermittently reporting melena.  Continue omeprazole 20 mg twice daily.    Uncomplicated alcohol dependence (HCC)- (present on admission)  Assessment & Plan  Per patient in early remission, monitor for signs of withdrawal, none on my evaluation.         VTE prophylaxis:    enoxaparin ppx      I have performed a physical exam and reviewed and updated ROS and Plan today (12/20/2024). In review of yesterday's note (12/19/2024), there are no changes except as documented above.

## 2024-12-21 NOTE — DIETARY
"Nutrition Services: Initial Assessment     Day 0 of admit. Brenda Cartagena is 39 y.o., female with admitting DX of Cystic mass of pancreas, Pancreatic duct stones.    Consult Received for: BMI <19    Current Hospital Problems List:    Principal Problem:    Cystic mass of pancreas (POA: Yes)  Active Problems:    Uncomplicated alcohol dependence (HCC) (POA: Yes)    Intractable abdominal pain (POA: Yes)    Anemia (POA: Yes)    Gastric lesion (POA: Unknown)    Anxiety (POA: Unknown)    Eczema (POA: Unknown)    Alcohol-induced chronic pancreatitis (HCC) (POA: Yes)    Pancreatic duct stones (POA: Yes)    IPMN (intraductal papillary mucinous neoplasm) (POA: Yes)    Constipation due to opioid therapy (POA: Yes)  Resolved Problems:    * No resolved hospital problems. *    Nutrition Assessment:      Height: 160 cm (5' 3\")  Weight: 47.6 kg (104 lb 15 oz)  Weight taken via: Stand Up Scale  BMI Calculated: 18.59  BMI Classification: WNL     Weight Readings from Last 5 encounters:   Wt Readings from Last 5 Encounters:   12/20/24 47.6 kg (104 lb 15 oz)   11/13/24 53.7 kg (118 lb 6.2 oz)   06/09/24 52.7 kg (116 lb 2.9 oz)   07/20/15 57.6 kg (127 lb)   08/23/14 56.7 kg (125 lb)     Objective:   Pertinent Labs: 12/20: Na 131 (L), Glu 151 (H). 12/19 Alb 3.8  Pertinent Meds: Folic acid, MVI  Skin/Wounds:  No pressure injuries per chart review  Food Allergies: peanut and tree nuts  Last BM:      (PTA)     Current Diet Order/Intake:   Regular, low fat . PO intake 0% to 25-50%    Subjective:   Spoke with pt at bedside. She reports very poor intake for 2 weeks due to nausea and vomiting. She was unable to keep food down. Currently she is preferring cold foods and is receptive to addition of Ensure and chocolate milk.    Nutrition Focused Physical Exam (NFPE)  Weight Loss: Pt with 11.3% weight loss in just over one month which is severe.  Muscle Mass: Mild Wasting at temple  Subcutaneous Fat: Well Nourished, no loss noted  Fluid " Accumulation: None noted  Reduced  Strength: N/A in acute care setting.    Nutrition Diagnosis:      Inadequate Oral Intake related to nausea and vomiting as evidenced by PO intake <50%.      Severe Malnutrition in context of Acute Illness or Injury related to nausea and vomiting in per with history of pancreatitis as evidenced by 11.3% weight loss in just over one month and reported poor oral intake <50% of needs x 2 weeks.      RD notified provider  Dr Chaparro .     Nutrition Interventions:      Recommend Ensure Plus High Protein (provides 350 calories) 20 g protein per 8 fl oz) TID.  Patient aware of active plan of care as appropriate.       Nutrition Monitoring and Evaluation:      Monitor nutrition POC, goal for >50% intake from meals and supplements.  Additional fluids per MD/DO  Monitor vital signs pertinent to nutrition.    RD following and will provide updated recommendations as indicated.      Audelia Nevarez R.D.                                         [facility]/AND CRITERIA FOR MALNUTRITION

## 2024-12-21 NOTE — CARE PLAN
The patient is Stable - Low risk of patient condition declining or worsening    Shift Goals  Clinical Goals: Pain control  Patient Goals: Pain control  Family Goals: IRINEO    Progress made toward(s) clinical / shift goals:    Problem: Mobility  Goal: Patient's capacity to carry out activities will improve  Description: Target End Date:  Prior to discharge or change in level of care    1.  Assess for barriers to mobility/activity  2.  Implement activity per interdisciplinary team recommendations  3.  Target activity level identified and patient/family/caregiver aware of goal  4.  Provide assistive devices  5.  Instruct patient/caregiver on proper use of assistive/adaptive devices  6.  Schedule activities and rest periods to decrease effects of fatigue  7.  Encourage mobilization to extent of ability  8.  Maintain proper body alignment  9.  Provide adequate pain management to allow progressive mobilization  10. Implement pace maker precautions as needed  Outcome: Progressing       Problem: Pain - Post Surgery  Goal: Alleviation or reduction of pain post surgery  Description: Target End Date:  Target End Date:  1 - 3 days post op    1.  Pain assessed q2 hours for 24 hours, then q4 hours during use of PCA  2.  Transition to oral medications as appropriate  3.  Epidural assessment if applicable  4.  Ice to surgical site per order  Outcome: Progressing          Patient is not progressing towards the following goals:

## 2024-12-21 NOTE — CARE PLAN
The patient is Stable - Low risk of patient condition declining or worsening    Shift Goals  Clinical Goals: pain control  Patient Goals: pain control  Family Goals: IRINEO    Progress made toward(s) clinical / shift goals:    Problem: Pain - Standard  Goal: Alleviation of pain or a reduction in pain to the patient’s comfort goal  Outcome: Progressing     Problem: Knowledge Deficit - Standard  Goal: Patient and family/care givers will demonstrate understanding of plan of care, disease process/condition, diagnostic tests and medications  Outcome: Progressing     Problem: Neuro Status  Goal: Neuro status will remain stable or improve  Outcome: Progressing

## 2024-12-21 NOTE — ASSESSMENT & PLAN NOTE
"12/20/2024  Per RD:  \"Inadequate Oral Intake related to nausea and vomiting as evidenced by PO intake <50%.       Severe Malnutrition in context of Acute Illness or Injury related to nausea and vomiting in per with history of pancreatitis as evidenced by 11.3% weight loss in just over one month and reported poor oral intake <50% of needs x 2 weeks.  \"    Discussed with    Audelia Nevarez R.D.    Starting protein shakes    12/21/2024  Continue mirtazapine 7.5 mg at nighttime  Continue protein shakes  "

## 2024-12-22 PROBLEM — R44.3 HALLUCINATIONS: Status: ACTIVE | Noted: 2024-12-22

## 2024-12-22 PROCEDURE — 700111 HCHG RX REV CODE 636 W/ 250 OVERRIDE (IP): Mod: JZ

## 2024-12-22 PROCEDURE — 99233 SBSQ HOSP IP/OBS HIGH 50: CPT | Performed by: STUDENT IN AN ORGANIZED HEALTH CARE EDUCATION/TRAINING PROGRAM

## 2024-12-22 PROCEDURE — 700111 HCHG RX REV CODE 636 W/ 250 OVERRIDE (IP): Mod: JZ | Performed by: STUDENT IN AN ORGANIZED HEALTH CARE EDUCATION/TRAINING PROGRAM

## 2024-12-22 PROCEDURE — 700102 HCHG RX REV CODE 250 W/ 637 OVERRIDE(OP): Performed by: STUDENT IN AN ORGANIZED HEALTH CARE EDUCATION/TRAINING PROGRAM

## 2024-12-22 PROCEDURE — 770006 HCHG ROOM/CARE - MED/SURG/GYN SEMI*

## 2024-12-22 PROCEDURE — A9270 NON-COVERED ITEM OR SERVICE: HCPCS | Performed by: STUDENT IN AN ORGANIZED HEALTH CARE EDUCATION/TRAINING PROGRAM

## 2024-12-22 RX ORDER — HYDROMORPHONE HYDROCHLORIDE 4 MG/1
4 TABLET ORAL EVERY 6 HOURS
Status: DISCONTINUED | OUTPATIENT
Start: 2024-12-22 | End: 2024-12-23 | Stop reason: HOSPADM

## 2024-12-22 RX ORDER — HALOPERIDOL 5 MG/ML
2.5 INJECTION INTRAMUSCULAR ONCE
Status: COMPLETED | OUTPATIENT
Start: 2024-12-22 | End: 2024-12-22

## 2024-12-22 RX ADMIN — OXYCODONE HYDROCHLORIDE 10 MG: 10 TABLET ORAL at 09:24

## 2024-12-22 RX ADMIN — HYDROMORPHONE HYDROCHLORIDE 1 MG: 1 INJECTION, SOLUTION INTRAMUSCULAR; INTRAVENOUS; SUBCUTANEOUS at 05:35

## 2024-12-22 RX ADMIN — THERA TABS 1 TABLET: TAB at 05:34

## 2024-12-22 RX ADMIN — OMEPRAZOLE 20 MG: 20 CAPSULE, DELAYED RELEASE ORAL at 05:34

## 2024-12-22 RX ADMIN — OXYCODONE HYDROCHLORIDE 10 MG: 10 TABLET ORAL at 19:43

## 2024-12-22 RX ADMIN — DOCUSATE SODIUM 100 MG: 100 CAPSULE, LIQUID FILLED ORAL at 16:46

## 2024-12-22 RX ADMIN — OXYCODONE HYDROCHLORIDE 10 MG: 10 TABLET ORAL at 03:35

## 2024-12-22 RX ADMIN — OMEPRAZOLE 20 MG: 20 CAPSULE, DELAYED RELEASE ORAL at 16:45

## 2024-12-22 RX ADMIN — POLYETHYLENE GLYCOL 3350 1 PACKET: 17 POWDER, FOR SOLUTION ORAL at 16:46

## 2024-12-22 RX ADMIN — HYDROMORPHONE HYDROCHLORIDE 4 MG: 4 TABLET ORAL at 16:45

## 2024-12-22 RX ADMIN — SENNOSIDES AND DOCUSATE SODIUM 2 TABLET: 50; 8.6 TABLET ORAL at 16:45

## 2024-12-22 RX ADMIN — HALOPERIDOL LACTATE 2.5 MG: 5 INJECTION, SOLUTION INTRAMUSCULAR at 05:35

## 2024-12-22 RX ADMIN — HYDROCORTISONE: 1 CREAM TOPICAL at 16:46

## 2024-12-22 RX ADMIN — ENOXAPARIN SODIUM 40 MG: 100 INJECTION SUBCUTANEOUS at 16:46

## 2024-12-22 RX ADMIN — DULOXETINE HYDROCHLORIDE 60 MG: 60 CAPSULE, DELAYED RELEASE ORAL at 05:35

## 2024-12-22 RX ADMIN — ONDANSETRON 4 MG: 2 INJECTION INTRAMUSCULAR; INTRAVENOUS at 03:36

## 2024-12-22 RX ADMIN — FOLIC ACID 1 MG: 1 TABLET ORAL at 05:35

## 2024-12-22 ASSESSMENT — PAIN DESCRIPTION - PAIN TYPE
TYPE: ACUTE PAIN

## 2024-12-22 ASSESSMENT — ENCOUNTER SYMPTOMS
CHILLS: 0
SHORTNESS OF BREATH: 0
PALPITATIONS: 0
HEADACHES: 0
NERVOUS/ANXIOUS: 1
COUGH: 0
VOMITING: 0
DIZZINESS: 0
NAUSEA: 0
MYALGIAS: 0
FEVER: 0
HALLUCINATIONS: 1
ABDOMINAL PAIN: 1

## 2024-12-22 NOTE — CARE PLAN
Problem: Pain - Standard  Goal: Alleviation of pain or a reduction in pain to the patient’s comfort goal  Outcome: Not Progressing     Problem: Knowledge Deficit - Standard  Goal: Patient and family/care givers will demonstrate understanding of plan of care, disease process/condition, diagnostic tests and medications  Outcome: Progressing     Problem: Mobility  Goal: Patient's capacity to carry out activities will improve  Outcome: Progressing   The patient is Stable - Low risk of patient condition declining or worsening    Shift Goals  Clinical Goals: pain management  Patient Goals: pain management  Family Goals: go home    Progress made toward(s) clinical / shift goals:  Understands poc.     Patient is not progressing towards the following goals:  Persistent pain. Medicated PRN per MAR.      Problem: Pain - Standard  Goal: Alleviation of pain or a reduction in pain to the patient’s comfort goal  Outcome: Not Progressing

## 2024-12-22 NOTE — PROGRESS NOTES
[facility] Medicine Daily Progress Note    Date of Service  12/21/2024    Chief Complaint  Brenda Cartagena is a 39 y.o. female admitted 12/17/2024 with intractable and recurrent abdominal pain.    Hospital Course  Brenda Cartagena is a 39 y.o. female with cystic pancreatic mass concerning for malignancy, ETOH dependence in early remission, anxiety, who presented 12/17/2024 with intractable abdominal pain, pancreatic mass.     Patient recently admitted from 11/10 to 11/13/2024 with concerns of pancreatic mass.  She underwent EGD for concerns of GI bleeding found to have small gastric erosions and gastritis, grade a esophagitis, discharged on twice daily PPI and to avoid all NSAIDs as well as close outpatient GI follow-up for EUS/ERCP.  She has had EUS and ERCP performed outpatient, presented today to [facility] for intractable abdominal pain, nausea vomiting, progressively worse over the past 2 weeks, now severe.  She has been experiencing fatigue and malaise, inability to tolerate p.o. intake, occasional dyspnea with exertion. No fevers or chills headache or vision changes chest pain cough no dysuria.  Presented to [facility] for above symptoms and after discussion with ERP and hepatobiliary surgery team plan was to transfer patient to [facility] for further evaluation.     In [facility] ED she is afebrile pulse from 80s to 107, normal respiratory rate systolic blood pressure up to 130s pulse ox 93 to 100% on room air.  No leukocytosis stable anemia normal platelet count although I suspect she is hemoconcentrated CMP bicarb 18 glucose 133 lipase within normal limits as is renal and liver function.  ERP discussed with Dr. Byers from hepatobiliary surgery team, will consult on patient and they will see her in the morning.  Requested CT with pancreas protocol as well as requested GI consultation in the morning.  Subsequently for to hospitalist for admission.       Interval Problem Update  12/18/2024  Patient was seen and  examined on the neurology floor on 2 separate occasions.  Continues to complain of severe abdominal pain requiring IV Dilaudid and IV morphine.    I discussed the case with the gastroenterologist on-call, who did not feel patient required any further workup such as EGD.  Patient was at [facility] a couple of weeks ago.  Patient was recently evaluated by Dr. Dawson of [facility], unable to remove pancreatic stone.  Referred to hepatobiliary surgery, Dr. Byers.    I discussed with Dr. Byers of hepatobiliary surgery.  Recommending pain control and cessation of alcohol, which the patient states that she has already done.  Establishment with pain management doctor for outpatient follow-up at the clinic.  Patient may be a candidate for Puestow procedure to relieve patient's chronic pancreatitis.  Need cessation of alcohol for 6 months prior to undergoing procedure.    Discussed with patient about starting methadone versus buprenorphine and she is agreeable to starting buprenorphine with duloxetine to increase pain tolerance.  I have ordered oxycodone as well as IV Dilaudid as needed for breakthrough pain.    12/19/2024  Patient was seen and examined on the neurology floor on 2 separate occasions.  Hepatobiliary surgery recommending outpatient attempt for pancreatic stent.  Outpatient follow-up for surgical evaluation.  Patient is willing to try buprenorphine again for pain control.  Made her feel funny last night.  Continue oxycodone.  Continue to require IV Dilaudid for breakthrough pain.  Diet resumed    12/20/2024  Patient reports hallucinations with reinitiation of buprenorphine.  She is willing to try methadone.  Ongoing need for IV Dilaudid for breakthrough pain.  Meeting criteria for severe protein calorie nutrition.  Starting protein shake supplementation.  Prolonged discussion regarding outpatient continue workup and follow-up for consideration of pancreatic stent and also Puestow procedure.    Patient  is being enrolled in inpatient status due to ongoing need for pain control with oral pain medications with continuous cardiac and pulse oximetry monitoring during titration of narcotic pain medications.  She is continue to require IV Dilaudid for pain control.  Transitioning from buprenorphine to methadone.  She is unable to have adequate oral intake due to her ongoing pain.    12/21/2024  Patient was seen and examined on the neurology floor.  Methadone titrated up to 15 mg every 8 hours.  Continues to require IV Dilaudid.  Duloxetine increased to 60 mg daily.    I have discussed this patient's plan of care and discharge plan at IDT rounds today with Case Management, Nursing, Nursing leadership, and other members of the IDT team.    Consultants/Specialty  Hepatobiliary surgery    Code Status  Full Code    Disposition  The patient is not medically cleared for discharge to home or a post-acute facility.  Anticipate discharge to: home with close outpatient follow-up    I have placed the appropriate orders for post-discharge needs.    Review of Systems  Review of Systems   Constitutional:  Negative for chills and fever.   Respiratory:  Negative for cough and shortness of breath.    Cardiovascular:  Negative for chest pain and palpitations.   Gastrointestinal:  Positive for abdominal pain. Negative for nausea and vomiting.   Musculoskeletal:  Negative for joint pain and myalgias.   Neurological:  Negative for dizziness and headaches.        Physical Exam  Temp:  [36.4 °C (97.5 °F)-37 °C (98.6 °F)] 36.4 °C (97.5 °F)  Pulse:  [105-117] 105  Resp:  [16-18] 17  BP: (106-108)/(72-73) 106/73  SpO2:  [85 %-95 %] 94 %    Physical Exam  Vitals and nursing note reviewed.   Constitutional:       Appearance: She is normal weight. She is ill-appearing. She is not diaphoretic.   HENT:      Head: Normocephalic and atraumatic.      Mouth/Throat:      Mouth: Mucous membranes are moist.      Pharynx: Oropharynx is clear. No oropharyngeal  exudate.   Eyes:      General:         Right eye: No discharge.         Left eye: No discharge.      Conjunctiva/sclera: Conjunctivae normal.      Pupils: Pupils are equal, round, and reactive to light.   Cardiovascular:      Rate and Rhythm: Normal rate and regular rhythm.      Pulses: Normal pulses.      Heart sounds: Normal heart sounds. No murmur heard.  Pulmonary:      Effort: Pulmonary effort is normal. No respiratory distress.      Breath sounds: Normal breath sounds.   Abdominal:      General: Abdomen is flat. Bowel sounds are normal. There is no distension.      Palpations: Abdomen is soft.      Tenderness: There is abdominal tenderness. There is no guarding or rebound.   Musculoskeletal:      Cervical back: Neck supple. No tenderness.      Right lower leg: No edema.      Left lower leg: No edema.   Skin:     Coloration: Skin is pale.   Neurological:      Mental Status: She is alert and oriented to person, place, and time.      Motor: No weakness.   Psychiatric:         Attention and Perception: Attention normal.         Mood and Affect: Mood normal.         Fluids    Intake/Output Summary (Last 24 hours) at 12/21/2024 1918  Last data filed at 12/20/2024 2014  Gross per 24 hour   Intake 100 ml   Output --   Net 100 ml        Laboratory  Recent Labs     12/19/24  0237 12/21/24  0846   WBC 6.6 7.5   RBC 3.28* 2.99*   HEMOGLOBIN 9.9* 9.1*   HEMATOCRIT 30.1* 27.7*   MCV 91.8 92.6   MCH 30.2 30.4   MCHC 32.9 32.9   RDW 49.7 47.7   PLATELETCT 195 181   MPV 11.3 11.2     Recent Labs     12/19/24  0237 12/20/24  0918 12/21/24  0846   SODIUM 136 131* 132*   POTASSIUM 3.1* 3.8 3.4*   CHLORIDE 101 97 97   CO2 23 25 25   GLUCOSE 151* 151* 157*   BUN 8 12 13   CREATININE 0.34* 0.39* 0.38*   CALCIUM 8.6 8.9 8.7                   Imaging  CT-PANCREAS AND ABDOMEN WITH & W/O   Final Result         1.  Heterogeneous lesion in the pancreatic head with dilatation the pancreatic duct concerning for pancreatic mass lesion,  recommend follow-up MRI pancreas for further characterization.   2.  Multiple intermediate density soft tissue masses along the greater curvature of the stomach, appearance most compatible with underlying neoplastic disease, component of hemorrhage is not excluded given density.   3.  Hepatomegaly           Assessment/Plan  * Cystic mass of pancreas- (present on admission)  Assessment & Plan  Hepatobiliary surgery consulted by ERP, will need to consult GI in the morning.  Reportedly had EUS/ERCP performed, will need to obtain results of path.    12/18/2024  I discussed the case with the gastroenterologist on-call, who did not feel patient required any further workup such as EGD.  Referred to hepatobiliary surgery, Dr. Byers.    I discussed with Dr. Byers of hepatobiliary surgery.  Recommending pain control and cessation of alcohol, which the patient states that she has already done.  Establishment with pain management doctor for outpatient follow-up at the clinic.  Patient may be a candidate for Puestow procedure to relieve patient's chronic pancreatitis.  Need cessation of alcohol for 6 months prior to undergoing procedure.    12/19/2024  Plan for outpatient pancreatic stent with Dr. Dawson    12/20/2024  Prolonged discussion with the patient and the  regarding outpatient planned procedures.    IPMN (intraductal papillary mucinous neoplasm)- (present on admission)  Assessment & Plan  12/18/2024  Concern for this as per discussion with Dr. Byers of hepatobiliary surgery.  Outpatient follow-up at her clinic.    Alcohol-induced chronic pancreatitis (HCC)- (present on admission)  Assessment & Plan  12/18/2024  Due to alcohol.  Patient may be a candidate for Puestow procedure to relieve patient's chronic pancreatitis.  Need cessation of alcohol for 6 months prior to undergoing procedure.    Plan for outpatient follow-up with hepatobiliary surgery.    Intractable abdominal pain- (present on  "admission)  Assessment & Plan  Analgesia as needed, adjust regimen.  Antiemetics as needed    12/18/2024  Discussed with patient about starting methadone versus buprenorphine and she is agreeable to starting buprenorphine with duloxetine to increase pain tolerance.  I have ordered oxycodone as well as IV Dilaudid as needed for breakthrough pain.  Outpatient referral for pain management provider  Continuous pulse oximetry monitoring to monitor for hypoxia and respiratory depression while receiving IV narcotic medications.      12/19/2024  Patient is willing to try buprenorphine again for pain control.  Made her feel funny last night.  Continue oxycodone.  Continue to require IV Dilaudid for breakthrough pain.  Continuous pulse oximetry monitoring to monitor for hypoxia and respiratory depression while receiving IV narcotic medications.    12/20/2024  Patient appears to be having withdrawal symptoms with buprenorphine.  She is going to try methadone for pain control.  She has required IV Dilaudid twice today for breakthrough pain.    Start methadone 5 mg every 8 hours.  Monitor QTc prolongation.  Continuous cardiac monitoring.  Continue IV Dilaudid as needed for breakthrough pain.  Continuous pulse oximetry monitoring to monitor for hypoxia and respiratory depression while receiving IV narcotic medications.    12/21/2024  Methadone titrated up to 15 mg every 8 hours.  Continues to require IV Dilaudid.  Duloxetine increased to 60 mg daily.  Continue IV Dilaudid as needed for breakthrough pain.  Continuous pulse oximetry monitoring to monitor for hypoxia and respiratory depression while receiving IV narcotic medications.    Severe protein-calorie malnutrition (HCC)- (present on admission)  Assessment & Plan  12/20/2024  Per RD:  \"Inadequate Oral Intake related to nausea and vomiting as evidenced by PO intake <50%.       Severe Malnutrition in context of Acute Illness or Injury related to nausea and vomiting in per with " "history of pancreatitis as evidenced by 11.3% weight loss in just over one month and reported poor oral intake <50% of needs x 2 weeks.  \"    Discussed with    Audelia Nevarez R.D.    Starting protein shakes    12/21/2024  Continue mirtazapine 7.5 mg at nighttime  Continue protein shakes    Constipation due to opioid therapy- (present on admission)  Assessment & Plan  12/18/2024  Start MiraLAX and docusate twice daily.    12/19/2024  No bowel movement yet.  Continue MiraLAX and docusate twice daily.    Pancreatic duct stones- (present on admission)  Assessment & Plan  12/18/2024  Patient was at [facility] a couple of weeks ago.  Patient was recently evaluated by Dr. Dawson of [facility], unable to remove pancreatic stone.  Referred to hepatobiliary surgery, Dr. Byers.    Pain management with outpatient follow-up for consideration of surgery if remaining sober.    Eczema  Assessment & Plan  Hydrocortisone cream    Anxiety  Assessment & Plan  Ativan prn for anxiety    Gastric lesion  Assessment & Plan  Noted on admission CT, multiple intermediate density soft tissue masses along the greater curvature of the stomach, appearance most compatible with underlying neoplastic disease, component of hemorrhage is not excluded given density.    GI consult in a.m., EGD last month for concerns of GI bleeding showed small gastric erosions and gastritis, grade a esophagitis.  Patient reports intermittent black stools for the past couple of weeks.  She assumed it was something she was eating.  Hemoglobin on admission 9.7 although she is quite dehydrated, I suspect it is lower, twice daily PPI ordered, monitor for signs of bleeding, transfuse hemoglobin less than 7.  Given recent EGD as well as CT abdomen pelvis with and without contrast in June of this year that did not note these masses I suspect they are more likely from hemorrhage however will need to discuss with hepatobiliary and GI team.    12/18/2024  No " further EGD indicated at this time per discussion with gastroenterologist on-call.  Continue omeprazole 20 mg by mouth twice daily.    Anemia- (present on admission)  Assessment & Plan  Patient reports intermittent black stool, suspect slow GI bleed, recent EGD showed small gastric erosions and gastritis, grade a esophagitis.  Continue PPI twice daily.  GI consult in a.m.    12/18/2024  Suspect due to chronic blood loss.  In setting of esophagitis alcohol related.  Work-up with ferritin, iron panel, reticulocyte panel, B12, folate, LDH, TSH     Per discussion with gastroenterologist on-call, deferring to hepatobiliary surgery.  Hemoglobin remaining stable although patient is intermittently reporting melena.  Continue omeprazole 20 mg twice daily.    Uncomplicated alcohol dependence (HCC)- (present on admission)  Assessment & Plan  Per patient in early remission, monitor for signs of withdrawal, none on my evaluation.         VTE prophylaxis:    enoxaparin ppx      I have performed a physical exam and reviewed and updated ROS and Plan today (12/21/2024). In review of yesterday's note (12/20/2024), there are no changes except as documented above.

## 2024-12-22 NOTE — PROGRESS NOTES
NOC HOSPITALIST CROSS COVER    Notified by RN regarding patient complaining of hallucinations, anxiety, restlessness.  Review of patient's medical record does not immediately identify any significant psychiatric history however she did report hallucinations with reinitiation of buprenorphine on 12/20.  Order placed for 2.5 mg IV Haldol.        -----------------------------------------------------------------------------------------------------------    Electronically signed by:  TREVOR Ignacio PA-C  Hospitalist Services

## 2024-12-22 NOTE — PROGRESS NOTES
Patient continually getting up without calling and turning of the bed alarm. Patient is now refusing bed alarm. Patient educated on importance of bed alarm and continuing to refuse it. Charge RN notified.

## 2024-12-22 NOTE — CARE PLAN
The patient is Stable - Low risk of patient condition declining or worsening    Shift Goals  Clinical Goals: pain control  Patient Goals: pain control  Family Goals: joe    Progress made toward(s) clinical / shift goals:  Patient medicated for pain throughout shift per MAR. Patient ambulating to bathroom throughout shift.     Problem: Pain - Standard  Goal: Alleviation of pain or a reduction in pain to the patient’s comfort goal  Outcome: Progressing     Problem: Mobility  Goal: Patient's capacity to carry out activities will improve  Outcome: Progressing       Patient is not progressing towards the following goals:

## 2024-12-23 VITALS
OXYGEN SATURATION: 92 % | HEIGHT: 63 IN | BODY MASS INDEX: 18.48 KG/M2 | RESPIRATION RATE: 16 BRPM | TEMPERATURE: 97.2 F | DIASTOLIC BLOOD PRESSURE: 67 MMHG | WEIGHT: 104.28 LBS | HEART RATE: 96 BPM | SYSTOLIC BLOOD PRESSURE: 96 MMHG

## 2024-12-23 PROBLEM — F11.20 CHRONIC NARCOTIC DEPENDENCE (HCC): Status: ACTIVE | Noted: 2024-12-23

## 2024-12-23 LAB
ANION GAP SERPL CALC-SCNC: 12 MMOL/L (ref 7–16)
BUN SERPL-MCNC: 15 MG/DL (ref 8–22)
CALCIUM SERPL-MCNC: 8.6 MG/DL (ref 8.5–10.5)
CHLORIDE SERPL-SCNC: 99 MMOL/L (ref 96–112)
CO2 SERPL-SCNC: 26 MMOL/L (ref 20–33)
CREAT SERPL-MCNC: 0.36 MG/DL (ref 0.5–1.4)
GFR SERPLBLD CREATININE-BSD FMLA CKD-EPI: 130 ML/MIN/1.73 M 2
GLUCOSE SERPL-MCNC: 111 MG/DL (ref 65–99)
MAGNESIUM SERPL-MCNC: 1.8 MG/DL (ref 1.5–2.5)
POTASSIUM SERPL-SCNC: 3.7 MMOL/L (ref 3.6–5.5)
SODIUM SERPL-SCNC: 137 MMOL/L (ref 135–145)

## 2024-12-23 PROCEDURE — 700102 HCHG RX REV CODE 250 W/ 637 OVERRIDE(OP): Performed by: STUDENT IN AN ORGANIZED HEALTH CARE EDUCATION/TRAINING PROGRAM

## 2024-12-23 PROCEDURE — A9270 NON-COVERED ITEM OR SERVICE: HCPCS | Performed by: STUDENT IN AN ORGANIZED HEALTH CARE EDUCATION/TRAINING PROGRAM

## 2024-12-23 PROCEDURE — A9270 NON-COVERED ITEM OR SERVICE: HCPCS

## 2024-12-23 PROCEDURE — 80048 BASIC METABOLIC PNL TOTAL CA: CPT

## 2024-12-23 PROCEDURE — 36415 COLL VENOUS BLD VENIPUNCTURE: CPT

## 2024-12-23 PROCEDURE — 83735 ASSAY OF MAGNESIUM: CPT

## 2024-12-23 PROCEDURE — 700102 HCHG RX REV CODE 250 W/ 637 OVERRIDE(OP)

## 2024-12-23 PROCEDURE — 99239 HOSP IP/OBS DSCHRG MGMT >30: CPT | Performed by: STUDENT IN AN ORGANIZED HEALTH CARE EDUCATION/TRAINING PROGRAM

## 2024-12-23 PROCEDURE — 700111 HCHG RX REV CODE 636 W/ 250 OVERRIDE (IP): Mod: JZ | Performed by: STUDENT IN AN ORGANIZED HEALTH CARE EDUCATION/TRAINING PROGRAM

## 2024-12-23 RX ORDER — OXYCODONE HYDROCHLORIDE 10 MG/1
10 TABLET ORAL EVERY 6 HOURS PRN
Qty: 20 TABLET | Refills: 0 | Status: SHIPPED | OUTPATIENT
Start: 2024-12-23 | End: 2024-12-28

## 2024-12-23 RX ORDER — DULOXETIN HYDROCHLORIDE 60 MG/1
60 CAPSULE, DELAYED RELEASE ORAL DAILY
Qty: 30 CAPSULE | Refills: 2 | Status: ON HOLD | OUTPATIENT
Start: 2024-12-24 | End: 2025-01-31

## 2024-12-23 RX ORDER — HYDROXYZINE HYDROCHLORIDE 25 MG/1
25 TABLET, FILM COATED ORAL ONCE
Status: COMPLETED | OUTPATIENT
Start: 2024-12-23 | End: 2024-12-23

## 2024-12-23 RX ORDER — HYDROMORPHONE HYDROCHLORIDE 4 MG/1
4 TABLET ORAL EVERY 6 HOURS
Qty: 20 TABLET | Refills: 0 | Status: SHIPPED | OUTPATIENT
Start: 2024-12-23 | End: 2024-12-28

## 2024-12-23 RX ORDER — POLYETHYLENE GLYCOL 3350 17 G/17G
17 POWDER, FOR SOLUTION ORAL 2 TIMES DAILY
Qty: 30 PACKET | Refills: 3 | Status: ON HOLD | OUTPATIENT
Start: 2024-12-23 | End: 2025-01-31

## 2024-12-23 RX ORDER — DOCUSATE SODIUM 100 MG/1
100 CAPSULE, LIQUID FILLED ORAL 2 TIMES DAILY
Qty: 60 CAPSULE | Refills: 2 | Status: SHIPPED | OUTPATIENT
Start: 2024-12-23

## 2024-12-23 RX ADMIN — HYDROXYZINE HYDROCHLORIDE 25 MG: 25 TABLET, FILM COATED ORAL at 00:40

## 2024-12-23 RX ADMIN — ONDANSETRON 4 MG: 2 INJECTION INTRAMUSCULAR; INTRAVENOUS at 00:15

## 2024-12-23 RX ADMIN — HYDROMORPHONE HYDROCHLORIDE 4 MG: 4 TABLET ORAL at 04:25

## 2024-12-23 RX ADMIN — FOLIC ACID 1 MG: 1 TABLET ORAL at 04:25

## 2024-12-23 RX ADMIN — DOCUSATE SODIUM 100 MG: 100 CAPSULE, LIQUID FILLED ORAL at 04:25

## 2024-12-23 RX ADMIN — HYDROMORPHONE HYDROCHLORIDE 4 MG: 4 TABLET ORAL at 12:36

## 2024-12-23 RX ADMIN — OMEPRAZOLE 20 MG: 20 CAPSULE, DELAYED RELEASE ORAL at 04:25

## 2024-12-23 RX ADMIN — OXYCODONE HYDROCHLORIDE 10 MG: 10 TABLET ORAL at 05:55

## 2024-12-23 RX ADMIN — THERA TABS 1 TABLET: TAB at 04:25

## 2024-12-23 RX ADMIN — OXYCODONE HYDROCHLORIDE 10 MG: 10 TABLET ORAL at 09:38

## 2024-12-23 RX ADMIN — HYDROMORPHONE HYDROCHLORIDE 4 MG: 4 TABLET ORAL at 00:11

## 2024-12-23 RX ADMIN — DULOXETINE HYDROCHLORIDE 60 MG: 60 CAPSULE, DELAYED RELEASE ORAL at 04:25

## 2024-12-23 ASSESSMENT — PAIN DESCRIPTION - PAIN TYPE
TYPE: ACUTE PAIN

## 2024-12-23 NOTE — DISCHARGE SUMMARY
Discharge Summary    CHIEF COMPLAINT ON ADMISSION  Chief Complaint   Patient presents with    Sent by MD    Abdominal Pain     Patient BIBA from [facility] for a pancreatic duct stone. Patient endorses pain x2 weeks, N/V.        Reason for Admission  Intractable abdominal pain due to chronic pancreatitis and pancreatic stones    Admission Date  12/17/2024    CODE STATUS  Full Code    HPI & HOSPITAL COURSE  Brenda Cartagena is a 39 y.o. female who presented on 12/17/2024 with intractable abdominal pain.  This is a pleasant woman with a history of alcohol dependence currently in remission, recurrent alcohol induced pancreatitis, pancreatic pseudocyst formation, who subsequently developed a pancreatic stone.  Patient was recently admitted from 10/10/2024 through 10/13/2024 with concerns of a pancreatic mass.  She underwent EGD due to concerns of a gastrointestinal bleed, which revealed small gastric erosions and gastritis.  Grade a esophagitis.  He underwent MRCP, which confirmed pancreatic ductal dilatation with some stone and debris as well as cystic lesion with internal debris and/or stones in the distal pancreatic body favoring IPMN.  Patient was subsequently discharged with proton pump inhibitor twice a day with instructions to avoid all NSAIDs.    Patient followed up outpatient with Dr. Dawson, and the patient underwent an EUS in which Dr. Dawson attempted to clear her pancreatic duct.  EUS demonstrated a stone lodged in the genu of the pancreas but was unable to remove it endoscopically.  Since the procedure, patient continued to have severe abdominal pain radiating to the back although through much of the time, patient was still drinking alcohol and using methamphetamine.  She reported recently stopping both with the exception of using twice within the last month.  Patient was noted to reside in [place] and presenting frequently to the emergency room for pain control as she had not established with  the pain management provider yet.  She was noted to have had significant weight loss due to decreased appetite.    Patient presented again to the emergency room at [facility] in [place] with intractable abdominal pain, nausea, vomiting, which had been worsening progressively for the past 2 weeks.  The outside ER provider contacted Dr. Jazmyn Byers of hepatobiliary surgery at [facility], and the patient was subsequently transferred to [facility] emergency room for higher level care.  In the emergency room, CT scan of the abdomen with pancreas protocol with and without contrast was performed, which showed heterogeneous lesion in the pancreatic head with dilation with the pancreatic duct concerning for pancreatic mass lesion.  Multiple intermediate density soft tissue masses along the greater curvature of the stomach appearance most compatible with underlying neoplastic disease with component of hemorrhage not excluding and given density.  Hepatomegaly noted.    Patient was admitted to the neurology floor for further care evaluation.  She was noted to be slightly tachycardic and experiencing intractable abdominal pain requiring IV narcotic pain medications.  Gastroenterology was consulted for the patient's pancreatic ductal stone 4 mm in the dilated portion of the PD imaging of the pancreas.  It was noted to be downstream portion, which was too small to remove and therefore could not be performed endoscopically.  Recommended Puestow procedure.  Pancreatic cystic lesion thought to be probable main duct IPMN.  Chronic pancreatitis noted on previous EUS with AUD.  Recommended hepatobiliary consultation for Puestow procedure.    Dr. Jazmyn Byers of hepatobiliary surgery was consulted.  Also felt that the patient had a possible IPMN in her pancreas in setting of her alcohol induced chronic pancreatitis.  It was felt anatomically, patient would be a candidate for the posterior  procedure to relieve her chronic pancreatitis.  However, cessation of alcohol use for 6 months was imperative prior to undergoing the procedure.  She was counseled that she would be considered for the surgical procedure if she abstained from alcohol for 6 months.  In the interim, recommended placement of a pancreatic duct stent, which may provide some pain relief.  Recommended establishment of pain management specialist for pain control prior to surgery and to wean her off of chronic narcotic pain medication use postoperatively.    During the patient's hospitalization, she continued to require significant amount of IV Dilaudid despite oxycodone 10 mg.  Attempted buprenorphine, which precipitated hallucinations likely from injection of narcotic withdrawal.  Patient was agreeable to trial of methadone, which provided some relief but at higher doses also precipitated hallucinations.  Methadone was changed to oral Dilaudid with adequate baseline pain control with use of oxycodone as needed for breakthrough pain.  Patient was able to wean off of IV Dilaudid and was discharged to home as per her request.  Referral for outpatient pain management provider was placed prior to discharge.    Therefore, she is discharged in good and stable condition to home with close outpatient follow-up.    Patient stayed over 2 midnights under observational status.    Discharge Date  12/23/2024    FOLLOW UP ITEMS POST DISCHARGE  -Follow-up with primary care provider in 3 to 5 days.  -Follow-up with Dr. Jazmyn Byers of [facility] hepatobiliary surgery.  -Establish care with pain management provider.      DISCHARGE DIAGNOSES  Principal Problem:    Cystic mass of pancreas (POA: Yes)  Active Problems:    Intractable abdominal pain (POA: Yes)    Alcohol-induced chronic pancreatitis (HCC) (POA: Yes)    Pancreatic duct stones (POA: Yes)    IPMN (intraductal papillary mucinous neoplasm) (POA: Yes)    Chronic narcotic dependence (HCC) (POA: Yes)     Uncomplicated alcohol dependence (HCC) (POA: Yes)    Anemia (POA: Yes)    Gastric lesion (POA: Unknown)    Anxiety (POA: Unknown)    Eczema (POA: Unknown)    Constipation due to opioid therapy (POA: Yes)    Severe protein-calorie malnutrition (HCC) (POA: Yes)    Hallucinations (POA: Yes)  Resolved Problems:    * No resolved hospital problems. *      FOLLOW UP  No future appointments.  Christian German D.O.  [address] [place] [id number]  [phone]    Follow up in 3 day(s)        MEDICATIONS ON DISCHARGE     Medication List        START taking these medications        Instructions   docusate sodium 100 MG Caps  Commonly known as: Colace   Take 1 Capsule by mouth 2 times a day. To prevent constipation while taking narcotic pain medications.  Dose: 100 mg     DULoxetine 60 MG Cpep delayed-release capsule  Start taking on: December 24, 2024  Commonly known as: Cymbalta   Take 1 Capsule by mouth every day.  Dose: 60 mg     HYDROmorphone 4 MG Tabs  Commonly known as: Dilaudid   Take 1 Tablet by mouth every 6 hours for 5 days.  Dose: 4 mg     Naloxone 4 MG/0.1ML Liqd  Commonly known as: Narcan   Administer 4 mg into affected nostril(S) one time as needed (somulence due to narcotic overdose) for up to 1 dose.  Dose: 1 Spray     polyethylene glycol/lytes Pack  Commonly known as: Miralax   Take 1 Packet by mouth 2 times a day. To prevent constipation while taking narcotic pain medications.  Dose: 17 g            CHANGE how you take these medications        Instructions   omeprazole 20 MG delayed-release capsule  What changed: when to take this  Commonly known as: PriLOSEC   Take 1 Capsule by mouth 2 times a day.  Dose: 20 mg     oxyCODONE immediate release 10 MG immediate release tablet  What changed:   medication strength  how much to take  reasons to take this  additional instructions  Another medication with the same name was removed. Continue taking this medication, and follow the directions you see  here.  Commonly known as: Roxicodone   Take 1 Tablet by mouth every 6 hours as needed for Severe Pain (breakthrough pain not relieved with dilaudid) for up to 5 days.  Dose: 10 mg              Allergies  Allergies   Allergen Reactions    Peanut-Derived Swelling     Tongue swelling    Other Food Unspecified     Allergy to walnuts and almonds    Tree Nuts Food Allergy Swelling     All nuts        DIET  Orders Placed This Encounter   Procedures    Diet Order Diet: Regular; Nutrient modifications: (optional): Low Fat     Standing Status:   Standing     Number of Occurrences:   1     Order Specific Question:   Diet:     Answer:   Regular [1]     Order Specific Question:   Nutrient modifications: (optional)     Answer:   Low Fat [5]       ACTIVITY  As tolerated.  Weight bearing as tolerated    CONSULTATIONS  Gastroenterology  Hepatobiliary surgery    PROCEDURES  None    LABORATORY  Lab Results   Component Value Date    SODIUM 137 12/23/2024    POTASSIUM 3.7 12/23/2024    CHLORIDE 99 12/23/2024    CO2 26 12/23/2024    GLUCOSE 111 (H) 12/23/2024    BUN 15 12/23/2024    CREATININE 0.36 (L) 12/23/2024        Lab Results   Component Value Date    WBC 7.5 12/21/2024    HEMOGLOBIN 9.1 (L) 12/21/2024    HEMATOCRIT 27.7 (L) 12/21/2024    PLATELETCT 181 12/21/2024        Total time of the discharge process 32 minutes.

## 2024-12-23 NOTE — CARE PLAN
The patient is Stable - Low risk of patient condition declining or worsening    Shift Goals  Clinical Goals: pain management, neuro status  Patient Goals: pain management, discharge  Family Goals: joe    Progress made toward(s) clinical / shift goals:      Problem: Neuro Status  Goal: Neuro status will remain stable or improve  Outcome: Progressing   Pt's neurological status (A/Ox4) remains unchanged throughout shift. Patient is up-self , call light within reach.   Patient is not progressing towards the following goals:      Problem: Pain - Standard  Goal: Alleviation of pain or a reduction in pain to the patient’s comfort goal  Outcome: Not Progressing   Frequent pain assessments throughout shift. PRN pain medications provided per MAR and pt request. Pharmacological and non-pharmacological interventions utilized.  Patient still rates pain 8-10 despite pharmacological and non-pharmacological interventions. Will notify provider if pain remains uncontrolled.

## 2024-12-23 NOTE — ASSESSMENT & PLAN NOTE
12/22/2024  Overnight receiving haldol.  Suspect from methadone.  Changed to oral dilaudid  Discussed with patient and .

## 2024-12-23 NOTE — DISCHARGE PLANNING
Case Management Discharge Planning    Admission Date: 12/17/2024  GMLOS: 4.8  ALOS: 3    6-Clicks ADL Score: 20  6-Clicks Mobility Score: 19      Anticipated Discharge Dispo: Discharge Disposition: Discharged to home/self care (01)    DME Needed: No    Action(s) Taken:   Rideline order placed per protocol.    Medically Clear: Yes    Next Steps: Wait for transportation.    Barriers to Discharge: Transportation

## 2024-12-23 NOTE — PROGRESS NOTES
[facility] Daily Progress Note    Date of Service  12/22/2024    Chief Complaint  Brenda Cartagena is a 39 y.o. female admitted 12/17/2024 with intractable and recurrent abdominal pain.    Hospital Course  Brenda Cartagena is a 39 y.o. female with cystic pancreatic mass concerning for malignancy, ETOH dependence in early remission, anxiety, who presented 12/17/2024 with intractable abdominal pain, pancreatic mass.     Patient recently admitted from 11/10 to 11/13/2024 with concerns of pancreatic mass.  She underwent EGD for concerns of GI bleeding found to have small gastric erosions and gastritis, grade a esophagitis, discharged on twice daily PPI and to avoid all NSAIDs as well as close outpatient GI follow-up for EUS/ERCP.  She has had EUS and ERCP performed outpatient, presented today to [place] for intractable abdominal pain, nausea vomiting, progressively worse over the past 2 weeks, now severe.  She has been experiencing fatigue and malaise, inability to tolerate p.o. intake, occasional dyspnea with exertion. No fevers or chills headache or vision changes chest pain cough no dysuria.  Presented to [facility] for above symptoms and after discussion with ERP and hepatobiliary surgery team plan was to transfer patient to [facility] for further evaluation.     In [facility] ED she is afebrile pulse from 80s to 107, normal respiratory rate systolic blood pressure up to 130s pulse ox 93 to 100% on room air.  No leukocytosis stable anemia normal platelet count although I suspect she is hemoconcentrated CMP bicarb 18 glucose 133 lipase within normal limits as is renal and liver function.  ERP discussed with Dr. Byers from hepatobiliary surgery team, will consult on patient and they will see her in the morning.  Requested CT with pancreas protocol as well as requested GI consultation in the morning.  Subsequently for to hospitalist for admission.       Interval Problem Update  12/18/2024  Patient was seen and  examined on the neurology floor on 2 separate occasions.  Continues to complain of severe abdominal pain requiring IV Dilaudid and IV morphine.    I discussed the case with the gastroenterologist on-call, who did not feel patient required any further workup such as EGD.  Patient was at [facility] a couple of weeks ago.  Patient was recently evaluated by Dr. Dawson of [facility], unable to remove pancreatic stone.  Referred to hepatobiliary surgery, Dr. Byers.    I discussed with Dr. Byers of hepatobiliary surgery.  Recommending pain control and cessation of alcohol, which the patient states that she has already done.  Establishment with pain management doctor for outpatient follow-up at the clinic.  Patient may be a candidate for Puestow procedure to relieve patient's chronic pancreatitis.  Need cessation of alcohol for 6 months prior to undergoing procedure.    Discussed with patient about starting methadone versus buprenorphine and she is agreeable to starting buprenorphine with duloxetine to increase pain tolerance.  I have ordered oxycodone as well as IV Dilaudid as needed for breakthrough pain.    12/19/2024  Patient was seen and examined on the neurology floor on 2 separate occasions.  Hepatobiliary surgery recommending outpatient attempt for pancreatic stent.  Outpatient follow-up for surgical evaluation.  Patient is willing to try buprenorphine again for pain control.  Made her feel funny last night.  Continue oxycodone.  Continue to require IV Dilaudid for breakthrough pain.  Diet resumed    12/20/2024  Patient reports hallucinations with reinitiation of buprenorphine.  She is willing to try methadone.  Ongoing need for IV Dilaudid for breakthrough pain.  Meeting criteria for severe protein calorie nutrition.  Starting protein shake supplementation.  Prolonged discussion regarding outpatient continue workup and follow-up for consideration of pancreatic stent and also Puestow procedure.    Patient  is being enrolled in inpatient status due to ongoing need for pain control with oral pain medications with continuous cardiac and pulse oximetry monitoring during titration of narcotic pain medications.  She is continue to require IV Dilaudid for pain control.  Transitioning from buprenorphine to methadone.  She is unable to have adequate oral intake due to her ongoing pain.    12/21/2024  Patient was seen and examined on the neurology floor.  Methadone titrated up to 15 mg every 8 hours.  Continues to require IV Dilaudid.  Duloxetine increased to 60 mg daily.    12/22/2024  Patient seen and examined on 2 separate occasions.  This morning very drowsy after getting IV haloperidol for hallucination and anxiety.  More alert in afternoon.  Changing methadone to oral dilaudid.  Continuing to require oxycodone and IV dilaudid for breakthrough pain.  Discontinuing mirtazepine.  Per , patient was calling him stating that she was at party.        I have discussed this patient's plan of care and discharge plan at IDT rounds today with Case Management, Nursing, Nursing leadership, and other members of the IDT team.    Consultants/Specialty  Hepatobiliary surgery    Code Status  Full Code    Disposition  The patient is not medically cleared for discharge to home or a post-acute facility.  Anticipate discharge to: skilled nursing facility    I have placed the appropriate orders for post-discharge needs.    Review of Systems  Review of Systems   Constitutional:  Positive for malaise/fatigue. Negative for chills and fever.   Respiratory:  Negative for cough and shortness of breath.    Cardiovascular:  Negative for chest pain and palpitations.   Gastrointestinal:  Positive for abdominal pain. Negative for nausea and vomiting.   Musculoskeletal:  Negative for joint pain and myalgias.   Neurological:  Negative for dizziness and headaches.   Psychiatric/Behavioral:  Positive for hallucinations. The patient is nervous/anxious.          Physical Exam  Temp:  [36.2 °C (97.2 °F)-37 °C (98.6 °F)] 36.6 °C (97.9 °F)  Pulse:  [] 107  Resp:  [16-18] 18  BP: ()/(62-76) 97/67  SpO2:  [88 %-91 %] 90 %    Physical Exam  Vitals and nursing note reviewed.   Constitutional:       Appearance: She is normal weight. She is ill-appearing. She is not diaphoretic.   HENT:      Head: Normocephalic and atraumatic.      Mouth/Throat:      Mouth: Mucous membranes are moist.      Pharynx: Oropharynx is clear. No oropharyngeal exudate.   Eyes:      General:         Right eye: No discharge.         Left eye: No discharge.      Conjunctiva/sclera: Conjunctivae normal.      Pupils: Pupils are equal, round, and reactive to light.   Cardiovascular:      Rate and Rhythm: Normal rate and regular rhythm.      Pulses: Normal pulses.      Heart sounds: Normal heart sounds. No murmur heard.  Pulmonary:      Effort: Pulmonary effort is normal. No respiratory distress.      Breath sounds: Normal breath sounds.   Abdominal:      General: Abdomen is flat. Bowel sounds are normal. There is no distension.      Palpations: Abdomen is soft.      Tenderness: There is abdominal tenderness. There is no guarding or rebound.   Musculoskeletal:      Cervical back: Neck supple. No tenderness.      Right lower leg: No edema.      Left lower leg: No edema.   Skin:     Coloration: Skin is pale.   Neurological:      Mental Status: She is alert and oriented to person, place, and time.      Motor: No weakness.   Psychiatric:         Attention and Perception: Attention normal.         Mood and Affect: Mood normal.         Fluids    Intake/Output Summary (Last 24 hours) at 12/22/2024 2024  Last data filed at 12/22/2024 1923  Gross per 24 hour   Intake 200 ml   Output --   Net 200 ml        Laboratory  Recent Labs     12/21/24  0846   WBC 7.5   RBC 2.99*   HEMOGLOBIN 9.1*   HEMATOCRIT 27.7*   MCV 92.6   MCH 30.4   MCHC 32.9   RDW 47.7   PLATELETCT 181   MPV 11.2     Recent Labs      12/20/24  0918 12/21/24  0846   SODIUM 131* 132*   POTASSIUM 3.8 3.4*   CHLORIDE 97 97   CO2 25 25   GLUCOSE 151* 157*   BUN 12 13   CREATININE 0.39* 0.38*   CALCIUM 8.9 8.7                   Imaging  CT-PANCREAS AND ABDOMEN WITH & W/O   Final Result         1.  Heterogeneous lesion in the pancreatic head with dilatation the pancreatic duct concerning for pancreatic mass lesion, recommend follow-up MRI pancreas for further characterization.   2.  Multiple intermediate density soft tissue masses along the greater curvature of the stomach, appearance most compatible with underlying neoplastic disease, component of hemorrhage is not excluded given density.   3.  Hepatomegaly           Assessment/Plan  * Cystic mass of pancreas- (present on admission)  Assessment & Plan  Hepatobiliary surgery consulted by ERP, will need to consult GI in the morning.  Reportedly had EUS/ERCP performed, will need to obtain results of path.    12/18/2024  I discussed the case with the gastroenterologist on-call, who did not feel patient required any further workup such as EGD.  Referred to hepatobiliary surgery, Dr. Byers.    I discussed with Dr. Byers of hepatobiliary surgery.  Recommending pain control and cessation of alcohol, which the patient states that she has already done.  Establishment with pain management doctor for outpatient follow-up at the clinic.  Patient may be a candidate for Puestow procedure to relieve patient's chronic pancreatitis.  Need cessation of alcohol for 6 months prior to undergoing procedure.    12/19/2024  Plan for outpatient pancreatic stent with Dr. Dawson    12/20/2024  Prolonged discussion with the patient and the  regarding outpatient planned procedures.    IPMN (intraductal papillary mucinous neoplasm)- (present on admission)  Assessment & Plan  12/18/2024  Concern for this as per discussion with Dr. Byers of hepatobiliary surgery.  Outpatient follow-up at her  clinic.    Alcohol-induced chronic pancreatitis (HCC)- (present on admission)  Assessment & Plan  12/18/2024  Due to alcohol.  Patient may be a candidate for Puestow procedure to relieve patient's chronic pancreatitis.  Need cessation of alcohol for 6 months prior to undergoing procedure.    Plan for outpatient follow-up with hepatobiliary surgery.    Intractable abdominal pain- (present on admission)  Assessment & Plan  Analgesia as needed, adjust regimen.  Antiemetics as needed    12/18/2024  Discussed with patient about starting methadone versus buprenorphine and she is agreeable to starting buprenorphine with duloxetine to increase pain tolerance.  I have ordered oxycodone as well as IV Dilaudid as needed for breakthrough pain.  Outpatient referral for pain management provider  Continuous pulse oximetry monitoring to monitor for hypoxia and respiratory depression while receiving IV narcotic medications.      12/19/2024  Patient is willing to try buprenorphine again for pain control.  Made her feel funny last night.  Continue oxycodone.  Continue to require IV Dilaudid for breakthrough pain.  Continuous pulse oximetry monitoring to monitor for hypoxia and respiratory depression while receiving IV narcotic medications.    12/20/2024  Patient appears to be having withdrawal symptoms with buprenorphine.  She is going to try methadone for pain control.  She has required IV Dilaudid twice today for breakthrough pain.    Start methadone 5 mg every 8 hours.  Monitor QTc prolongation.  Continuous cardiac monitoring.  Continue IV Dilaudid as needed for breakthrough pain.  Continuous pulse oximetry monitoring to monitor for hypoxia and respiratory depression while receiving IV narcotic medications.    12/21/2024  Methadone titrated up to 15 mg every 8 hours.  Continues to require IV Dilaudid.  Duloxetine increased to 60 mg daily.  Continue IV Dilaudid as needed for breakthrough pain.  Continuous pulse oximetry monitoring  "to monitor for hypoxia and respiratory depression while receiving IV narcotic medications.    12/22/2024  Continuing to require oxycodone and IV dilaudid for breakthrough pain.  Discontinuing mirtazepine.  Continue duloxetine 60 mg daily.  Continuous pulse oximetry monitoring to monitor for hypoxia and respiratory depression while receiving IV narcotic medications.    Hallucinations- (present on admission)  Assessment & Plan  12/22/2024  Overnight receiving haldol.  Suspect from methadone.  Changed to oral dilaudid  Discussed with patient and .    Severe protein-calorie malnutrition (HCC)- (present on admission)  Assessment & Plan  12/20/2024  Per RD:  \"Inadequate Oral Intake related to nausea and vomiting as evidenced by PO intake <50%.       Severe Malnutrition in context of Acute Illness or Injury related to nausea and vomiting in per with history of pancreatitis as evidenced by 11.3% weight loss in just over one month and reported poor oral intake <50% of needs x 2 weeks.  \"    Discussed with    Audelia Nevarez R.D.    Starting protein shakes    12/21/2024  Continue mirtazapine 7.5 mg at nighttime  Continue protein shakes    Constipation due to opioid therapy- (present on admission)  Assessment & Plan  12/18/2024  Start MiraLAX and docusate twice daily.    12/19/2024  No bowel movement yet.  Continue MiraLAX and docusate twice daily.    Pancreatic duct stones- (present on admission)  Assessment & Plan  12/18/2024  Patient was at [facility] a couple of weeks ago.  Patient was recently evaluated by Dr. Dawson of [facility], unable to remove pancreatic stone.  Referred to hepatobiliary surgery, Dr. Byers.    Pain management with outpatient follow-up for consideration of surgery if remaining sober.    Eczema  Assessment & Plan  Hydrocortisone cream    Anxiety  Assessment & Plan  Ativan prn for anxiety    Gastric lesion  Assessment & Plan  Noted on admission CT, multiple intermediate density " soft tissue masses along the greater curvature of the stomach, appearance most compatible with underlying neoplastic disease, component of hemorrhage is not excluded given density.    GI consult in a.m., EGD last month for concerns of GI bleeding showed small gastric erosions and gastritis, grade a esophagitis.  Patient reports intermittent black stools for the past couple of weeks.  She assumed it was something she was eating.  Hemoglobin on admission 9.7 although she is quite dehydrated, I suspect it is lower, twice daily PPI ordered, monitor for signs of bleeding, transfuse hemoglobin less than 7.  Given recent EGD as well as CT abdomen pelvis with and without contrast in June of this year that did not note these masses I suspect they are more likely from hemorrhage however will need to discuss with hepatobiliary and GI team.    12/18/2024  No further EGD indicated at this time per discussion with gastroenterologist on-call.  Continue omeprazole 20 mg by mouth twice daily.    Anemia- (present on admission)  Assessment & Plan  Patient reports intermittent black stool, suspect slow GI bleed, recent EGD showed small gastric erosions and gastritis, grade a esophagitis.  Continue PPI twice daily.  GI consult in a.m.    12/18/2024  Suspect due to chronic blood loss.  In setting of esophagitis alcohol related.  Work-up with ferritin, iron panel, reticulocyte panel, B12, folate, LDH, TSH     Per discussion with gastroenterologist on-call, deferring to hepatobiliary surgery.  Hemoglobin remaining stable although patient is intermittently reporting melena.  Continue omeprazole 20 mg twice daily.    Uncomplicated alcohol dependence (HCC)- (present on admission)  Assessment & Plan  Per patient in early remission, monitor for signs of withdrawal, none on my evaluation.         VTE prophylaxis:    enoxaparin ppx      I have performed a physical exam and reviewed and updated ROS and Plan today (12/22/2024). In review of  yesterday's note (12/21/2024), there are no changes except as documented above.

## 2024-12-23 NOTE — DISCHARGE PLANNING
DC Transport Scheduled    Transport Company Scheduled:  [employer]  Spoke with Rima at [facility] to schedule transport.  [facility] Trip #: [id number]    Scheduled Date: 12/23/2024  Scheduled Time: ASAP     Transport Type: Self  Destination: Home   Destination address: [address] [place] [address] [place]  [id number]    Notified care team of scheduled transport via [employer].     If there are any changes needed to the DC transportation scheduled, please contact [facility] at ext. [phone] between the hours of 6630-0561. If outside those hours, contact the ED Case Manager at ext. [phone].

## 2024-12-24 ENCOUNTER — TELEPHONE (OUTPATIENT)
Dept: HEALTH INFORMATION MANAGEMENT | Facility: OTHER | Age: 39
End: 2024-12-24
Payer: MEDICAID

## 2024-12-26 ENCOUNTER — TELEPHONE (OUTPATIENT)
Dept: SURGICAL ONCOLOGY | Facility: MEDICAL CENTER | Age: 39
End: 2024-12-26
Payer: MEDICAID

## 2024-12-26 NOTE — TELEPHONE ENCOUNTER
Future Appointments   Date Time Provider Department Center   1/21/2025  3:00 PM Jazmyn Byers M.D. [facility] None

## 2024-12-26 NOTE — TELEPHONE ENCOUNTER
----- Message from Physician Joni Malhotra M.D. sent at 12/26/2024  8:05 AM PST -----  Regarding: RE: Het  Mayelin felipe  ----- Message -----  From: Eugenia De Anda R.N.  Sent: 12/26/2024   7:43 AM PST  To: Joni Malhotra M.D.; #  Subject: RE: Appt                                         Good Morning,     This patient is scheduled on 12/31/24 with . Would you like to keep or reschedule with Dr.G? Neal  ----- Message -----  From: Jazmyn Byers M.D.  Sent: 12/19/2024   2:28 PM PST  To: [facility]  Subject: Appt                                             Please book this patient in my clinic as a new patient in 1 month.     Thanks,   AG

## 2024-12-30 ENCOUNTER — HOSPITAL ENCOUNTER (INPATIENT)
Facility: MEDICAL CENTER | Age: 39
End: 2024-12-30
Attending: EMERGENCY MEDICINE | Admitting: HOSPITALIST
Payer: MEDICAID

## 2024-12-30 ENCOUNTER — HOSPITAL ENCOUNTER (EMERGENCY)
Facility: MEDICAL CENTER | Age: 39
End: 2024-12-30
Attending: STUDENT IN AN ORGANIZED HEALTH CARE EDUCATION/TRAINING PROGRAM | Admitting: HOSPITALIST
Payer: MEDICAID

## 2024-12-30 VITALS
BODY MASS INDEX: 19.02 KG/M2 | DIASTOLIC BLOOD PRESSURE: 59 MMHG | HEIGHT: 63 IN | TEMPERATURE: 98.7 F | WEIGHT: 107.36 LBS | SYSTOLIC BLOOD PRESSURE: 97 MMHG | OXYGEN SATURATION: 95 % | RESPIRATION RATE: 16 BRPM | HEART RATE: 98 BPM

## 2024-12-30 DIAGNOSIS — R10.9 INTRACTABLE ABDOMINAL PAIN: Primary | ICD-10-CM

## 2024-12-30 DIAGNOSIS — D49.0 IPMN (INTRADUCTAL PAPILLARY MUCINOUS NEOPLASM): ICD-10-CM

## 2024-12-30 DIAGNOSIS — K85.90 PANCREATITIS WITHOUT NECROSIS OR INFECTION: ICD-10-CM

## 2024-12-30 DIAGNOSIS — R11.2 NAUSEA AND VOMITING, UNSPECIFIED VOMITING TYPE: ICD-10-CM

## 2024-12-30 DIAGNOSIS — K86.89 PANCREATIC DUCT STONES: ICD-10-CM

## 2024-12-30 DIAGNOSIS — R10.10 PAIN OF UPPER ABDOMEN: ICD-10-CM

## 2024-12-30 DIAGNOSIS — K86.89 PANCREATIC MASS: ICD-10-CM

## 2024-12-30 LAB
ALBUMIN SERPL BCP-MCNC: 3.4 G/DL (ref 3.2–4.9)
ALBUMIN/GLOB SERPL: 0.9 G/DL
ALP SERPL-CCNC: 82 U/L (ref 30–99)
ALT SERPL-CCNC: 11 U/L (ref 2–50)
ANION GAP SERPL CALC-SCNC: 12 MMOL/L (ref 7–16)
AST SERPL-CCNC: 25 U/L (ref 12–45)
BASOPHILS # BLD AUTO: 0.5 % (ref 0–1.8)
BASOPHILS # BLD: 0.04 K/UL (ref 0–0.12)
BILIRUB SERPL-MCNC: 0.4 MG/DL (ref 0.1–1.5)
BUN SERPL-MCNC: 14 MG/DL (ref 8–22)
CALCIUM ALBUM COR SERPL-MCNC: 9.5 MG/DL (ref 8.5–10.5)
CALCIUM SERPL-MCNC: 9 MG/DL (ref 8.4–10.2)
CHLORIDE SERPL-SCNC: 100 MMOL/L (ref 96–112)
CO2 SERPL-SCNC: 24 MMOL/L (ref 20–33)
CREAT SERPL-MCNC: 0.44 MG/DL (ref 0.5–1.4)
EOSINOPHIL # BLD AUTO: 0.15 K/UL (ref 0–0.51)
EOSINOPHIL NFR BLD: 1.8 % (ref 0–6.9)
ERYTHROCYTE [DISTWIDTH] IN BLOOD BY AUTOMATED COUNT: 50.2 FL (ref 35.9–50)
GFR SERPLBLD CREATININE-BSD FMLA CKD-EPI: 124 ML/MIN/1.73 M 2
GLOBULIN SER CALC-MCNC: 3.9 G/DL (ref 1.9–3.5)
GLUCOSE SERPL-MCNC: 121 MG/DL (ref 65–99)
HCT VFR BLD AUTO: 31.8 % (ref 37–47)
HGB BLD-MCNC: 10.2 G/DL (ref 12–16)
IMM GRANULOCYTES # BLD AUTO: 0.03 K/UL (ref 0–0.11)
IMM GRANULOCYTES NFR BLD AUTO: 0.4 % (ref 0–0.9)
LIPASE SERPL-CCNC: 47 U/L (ref 11–82)
LYMPHOCYTES # BLD AUTO: 2.15 K/UL (ref 1–4.8)
LYMPHOCYTES NFR BLD: 25.7 % (ref 22–41)
MAGNESIUM SERPL-MCNC: 1.8 MG/DL (ref 1.5–2.5)
MCH RBC QN AUTO: 29.1 PG (ref 27–33)
MCHC RBC AUTO-ENTMCNC: 32.1 G/DL (ref 32.2–35.5)
MCV RBC AUTO: 90.6 FL (ref 81.4–97.8)
MONOCYTES # BLD AUTO: 0.58 K/UL (ref 0–0.85)
MONOCYTES NFR BLD AUTO: 6.9 % (ref 0–13.4)
NEUTROPHILS # BLD AUTO: 5.43 K/UL (ref 1.82–7.42)
NEUTROPHILS NFR BLD: 64.7 % (ref 44–72)
NRBC # BLD AUTO: 0 K/UL
NRBC BLD-RTO: 0 /100 WBC (ref 0–0.2)
PLATELET # BLD AUTO: 383 K/UL (ref 164–446)
PMV BLD AUTO: 10.7 FL (ref 9–12.9)
POTASSIUM SERPL-SCNC: 3.6 MMOL/L (ref 3.6–5.5)
PROT SERPL-MCNC: 7.3 G/DL (ref 6–8.2)
RBC # BLD AUTO: 3.51 M/UL (ref 4.2–5.4)
SODIUM SERPL-SCNC: 136 MMOL/L (ref 135–145)
WBC # BLD AUTO: 8.4 K/UL (ref 4.8–10.8)

## 2024-12-30 PROCEDURE — 96376 TX/PRO/DX INJ SAME DRUG ADON: CPT

## 2024-12-30 PROCEDURE — 99253 IP/OBS CNSLTJ NEW/EST LOW 45: CPT | Performed by: SURGERY

## 2024-12-30 PROCEDURE — 700102 HCHG RX REV CODE 250 W/ 637 OVERRIDE(OP): Performed by: STUDENT IN AN ORGANIZED HEALTH CARE EDUCATION/TRAINING PROGRAM

## 2024-12-30 PROCEDURE — 96374 THER/PROPH/DIAG INJ IV PUSH: CPT

## 2024-12-30 PROCEDURE — 96375 TX/PRO/DX INJ NEW DRUG ADDON: CPT

## 2024-12-30 PROCEDURE — 85025 COMPLETE CBC W/AUTO DIFF WBC: CPT

## 2024-12-30 PROCEDURE — 770006 HCHG ROOM/CARE - MED/SURG/GYN SEMI*

## 2024-12-30 PROCEDURE — 700111 HCHG RX REV CODE 636 W/ 250 OVERRIDE (IP): Mod: JZ,UD | Performed by: HOSPITALIST

## 2024-12-30 PROCEDURE — 99285 EMERGENCY DEPT VISIT HI MDM: CPT

## 2024-12-30 PROCEDURE — 99254 IP/OBS CNSLTJ NEW/EST MOD 60: CPT | Performed by: INTERNAL MEDICINE

## 2024-12-30 PROCEDURE — 700105 HCHG RX REV CODE 258: Performed by: STUDENT IN AN ORGANIZED HEALTH CARE EDUCATION/TRAINING PROGRAM

## 2024-12-30 PROCEDURE — 83735 ASSAY OF MAGNESIUM: CPT

## 2024-12-30 PROCEDURE — A9270 NON-COVERED ITEM OR SERVICE: HCPCS | Performed by: HOSPITALIST

## 2024-12-30 PROCEDURE — 83690 ASSAY OF LIPASE: CPT

## 2024-12-30 PROCEDURE — 700111 HCHG RX REV CODE 636 W/ 250 OVERRIDE (IP): Performed by: STUDENT IN AN ORGANIZED HEALTH CARE EDUCATION/TRAINING PROGRAM

## 2024-12-30 PROCEDURE — 36415 COLL VENOUS BLD VENIPUNCTURE: CPT

## 2024-12-30 PROCEDURE — 99223 1ST HOSP IP/OBS HIGH 75: CPT | Performed by: HOSPITALIST

## 2024-12-30 PROCEDURE — 80053 COMPREHEN METABOLIC PANEL: CPT

## 2024-12-30 PROCEDURE — 700117 HCHG RX CONTRAST REV CODE 255

## 2024-12-30 PROCEDURE — 700102 HCHG RX REV CODE 250 W/ 637 OVERRIDE(OP): Performed by: HOSPITALIST

## 2024-12-30 PROCEDURE — A9270 NON-COVERED ITEM OR SERVICE: HCPCS | Performed by: STUDENT IN AN ORGANIZED HEALTH CARE EDUCATION/TRAINING PROGRAM

## 2024-12-30 PROCEDURE — 700111 HCHG RX REV CODE 636 W/ 250 OVERRIDE (IP): Mod: UD | Performed by: EMERGENCY MEDICINE

## 2024-12-30 PROCEDURE — 700101 HCHG RX REV CODE 250: Performed by: STUDENT IN AN ORGANIZED HEALTH CARE EDUCATION/TRAINING PROGRAM

## 2024-12-30 RX ORDER — ONDANSETRON 2 MG/ML
4 INJECTION INTRAMUSCULAR; INTRAVENOUS ONCE
Status: COMPLETED | OUTPATIENT
Start: 2024-12-30 | End: 2024-12-30

## 2024-12-30 RX ORDER — SODIUM CHLORIDE, SODIUM LACTATE, POTASSIUM CHLORIDE, CALCIUM CHLORIDE 600; 310; 30; 20 MG/100ML; MG/100ML; MG/100ML; MG/100ML
1000 INJECTION, SOLUTION INTRAVENOUS ONCE
Status: COMPLETED | OUTPATIENT
Start: 2024-12-30 | End: 2024-12-30

## 2024-12-30 RX ORDER — PROCHLORPERAZINE EDISYLATE 5 MG/ML
5-10 INJECTION INTRAMUSCULAR; INTRAVENOUS EVERY 4 HOURS PRN
Status: DISCONTINUED | OUTPATIENT
Start: 2024-12-30 | End: 2025-01-05 | Stop reason: HOSPADM

## 2024-12-30 RX ORDER — ONDANSETRON 4 MG/1
4 TABLET, ORALLY DISINTEGRATING ORAL EVERY 4 HOURS PRN
Status: DISCONTINUED | OUTPATIENT
Start: 2024-12-30 | End: 2025-01-05 | Stop reason: HOSPADM

## 2024-12-30 RX ORDER — ACETAMINOPHEN 325 MG/1
650 TABLET ORAL EVERY 6 HOURS PRN
Status: DISCONTINUED | OUTPATIENT
Start: 2024-12-30 | End: 2025-01-01

## 2024-12-30 RX ORDER — HYDROMORPHONE HYDROCHLORIDE 1 MG/ML
1 INJECTION, SOLUTION INTRAMUSCULAR; INTRAVENOUS; SUBCUTANEOUS ONCE
Status: COMPLETED | OUTPATIENT
Start: 2024-12-30 | End: 2024-12-30

## 2024-12-30 RX ORDER — AMOXICILLIN 250 MG
2 CAPSULE ORAL EVERY EVENING
Status: DISCONTINUED | OUTPATIENT
Start: 2024-12-30 | End: 2025-01-05 | Stop reason: HOSPADM

## 2024-12-30 RX ORDER — POLYETHYLENE GLYCOL 3350 17 G/17G
1 POWDER, FOR SOLUTION ORAL
Status: DISCONTINUED | OUTPATIENT
Start: 2024-12-30 | End: 2025-01-05 | Stop reason: HOSPADM

## 2024-12-30 RX ORDER — FOLIC ACID 1 MG/1
1 TABLET ORAL DAILY
Status: ON HOLD | COMMUNITY
End: 2025-01-31

## 2024-12-30 RX ORDER — HYDROMORPHONE HYDROCHLORIDE 1 MG/ML
0.5 INJECTION, SOLUTION INTRAMUSCULAR; INTRAVENOUS; SUBCUTANEOUS ONCE
Status: COMPLETED | OUTPATIENT
Start: 2024-12-30 | End: 2024-12-30

## 2024-12-30 RX ORDER — PROMETHAZINE HYDROCHLORIDE 25 MG/1
12.5-25 TABLET ORAL EVERY 4 HOURS PRN
Status: DISCONTINUED | OUTPATIENT
Start: 2024-12-30 | End: 2025-01-05 | Stop reason: HOSPADM

## 2024-12-30 RX ORDER — HYDROMORPHONE HYDROCHLORIDE 4 MG/1
4 TABLET ORAL EVERY 6 HOURS PRN
Status: ON HOLD | COMMUNITY
End: 2025-01-05

## 2024-12-30 RX ORDER — OXYCODONE HYDROCHLORIDE 10 MG/1
10 TABLET ORAL
Status: DISCONTINUED | OUTPATIENT
Start: 2024-12-30 | End: 2024-12-31

## 2024-12-30 RX ORDER — ONDANSETRON 2 MG/ML
4 INJECTION INTRAMUSCULAR; INTRAVENOUS EVERY 4 HOURS PRN
Status: DISCONTINUED | OUTPATIENT
Start: 2024-12-30 | End: 2025-01-05 | Stop reason: HOSPADM

## 2024-12-30 RX ORDER — PROMETHAZINE HYDROCHLORIDE 25 MG/1
12.5-25 SUPPOSITORY RECTAL EVERY 4 HOURS PRN
Status: DISCONTINUED | OUTPATIENT
Start: 2024-12-30 | End: 2025-01-05 | Stop reason: HOSPADM

## 2024-12-30 RX ORDER — METOCLOPRAMIDE 10 MG/1
10 TABLET ORAL ONCE
Status: COMPLETED | OUTPATIENT
Start: 2024-12-30 | End: 2024-12-30

## 2024-12-30 RX ORDER — HYDROMORPHONE HYDROCHLORIDE 4 MG/1
4 TABLET ORAL
Status: DISCONTINUED | OUTPATIENT
Start: 2024-12-30 | End: 2024-12-31

## 2024-12-30 RX ORDER — DULOXETIN HYDROCHLORIDE 30 MG/1
60 CAPSULE, DELAYED RELEASE ORAL DAILY
Status: DISCONTINUED | OUTPATIENT
Start: 2024-12-30 | End: 2025-01-05 | Stop reason: HOSPADM

## 2024-12-30 RX ORDER — LANOLIN ALCOHOL/MO/W.PET/CERES
100 CREAM (GRAM) TOPICAL 2 TIMES DAILY
Status: ON HOLD | COMMUNITY
End: 2025-01-31

## 2024-12-30 RX ORDER — OXYCODONE HYDROCHLORIDE 10 MG/1
10 TABLET ORAL EVERY 6 HOURS PRN
Status: ON HOLD | COMMUNITY
End: 2025-01-05

## 2024-12-30 RX ADMIN — OXYCODONE HYDROCHLORIDE 10 MG: 10 TABLET ORAL at 14:18

## 2024-12-30 RX ADMIN — DULOXETINE HYDROCHLORIDE 60 MG: 30 CAPSULE, DELAYED RELEASE ORAL at 11:23

## 2024-12-30 RX ADMIN — SODIUM CHLORIDE, POTASSIUM CHLORIDE, SODIUM LACTATE AND CALCIUM CHLORIDE 1000 ML: 600; 310; 30; 20 INJECTION, SOLUTION INTRAVENOUS at 02:30

## 2024-12-30 RX ADMIN — HYDROMORPHONE HYDROCHLORIDE 4 MG: 4 TABLET ORAL at 17:49

## 2024-12-30 RX ADMIN — ONDANSETRON 4 MG: 2 INJECTION INTRAMUSCULAR; INTRAVENOUS at 18:07

## 2024-12-30 RX ADMIN — IOHEXOL 100 ML: 350 INJECTION, SOLUTION INTRAVENOUS at 14:38

## 2024-12-30 RX ADMIN — ONDANSETRON 4 MG: 2 INJECTION INTRAMUSCULAR; INTRAVENOUS at 11:27

## 2024-12-30 RX ADMIN — ONDANSETRON 4 MG: 2 INJECTION INTRAMUSCULAR; INTRAVENOUS at 02:30

## 2024-12-30 RX ADMIN — SENNOSIDES AND DOCUSATE SODIUM 2 TABLET: 50; 8.6 TABLET ORAL at 17:49

## 2024-12-30 RX ADMIN — OXYCODONE HYDROCHLORIDE 10 MG: 10 TABLET ORAL at 20:15

## 2024-12-30 RX ADMIN — METOCLOPRAMIDE 10 MG: 10 TABLET ORAL at 03:27

## 2024-12-30 RX ADMIN — HYDROMORPHONE HYDROCHLORIDE 1 MG: 1 INJECTION, SOLUTION INTRAMUSCULAR; INTRAVENOUS; SUBCUTANEOUS at 06:50

## 2024-12-30 RX ADMIN — HYDROMORPHONE HYDROCHLORIDE 0.5 MG: 1 INJECTION, SOLUTION INTRAMUSCULAR; INTRAVENOUS; SUBCUTANEOUS at 03:27

## 2024-12-30 RX ADMIN — KETAMINE HYDROCHLORIDE 25 MG: 10 INJECTION INTRAMUSCULAR; INTRAVENOUS at 05:27

## 2024-12-30 RX ADMIN — OXYCODONE HYDROCHLORIDE 10 MG: 10 TABLET ORAL at 11:23

## 2024-12-30 RX ADMIN — HYDROMORPHONE HYDROCHLORIDE 1 MG: 1 INJECTION, SOLUTION INTRAMUSCULAR; INTRAVENOUS; SUBCUTANEOUS at 02:30

## 2024-12-30 ASSESSMENT — PAIN DESCRIPTION - PAIN TYPE
TYPE: ACUTE PAIN
TYPE: ACUTE PAIN;CHRONIC PAIN
TYPE: ACUTE PAIN
TYPE: CHRONIC PAIN
TYPE: ACUTE PAIN
TYPE: ACUTE PAIN;CHRONIC PAIN
TYPE: CHRONIC PAIN

## 2024-12-30 ASSESSMENT — COGNITIVE AND FUNCTIONAL STATUS - GENERAL
SUGGESTED CMS G CODE MODIFIER DAILY ACTIVITY: CI
DAILY ACTIVITIY SCORE: 23
WALKING IN HOSPITAL ROOM: A LITTLE
MOBILITY SCORE: 22
SUGGESTED CMS G CODE MODIFIER MOBILITY: CJ
DRESSING REGULAR LOWER BODY CLOTHING: A LITTLE
CLIMB 3 TO 5 STEPS WITH RAILING: A LITTLE

## 2024-12-30 ASSESSMENT — LIFESTYLE VARIABLES
AVERAGE NUMBER OF DAYS PER WEEK YOU HAVE A DRINK CONTAINING ALCOHOL: 0
ON A TYPICAL DAY WHEN YOU DRINK ALCOHOL HOW MANY DRINKS DO YOU HAVE: 0
TOTAL SCORE: 0
HOW MANY TIMES IN THE PAST YEAR HAVE YOU HAD 5 OR MORE DRINKS IN A DAY: 0
CONSUMPTION TOTAL: NEGATIVE
TOTAL SCORE: 0
EVER HAD A DRINK FIRST THING IN THE MORNING TO STEADY YOUR NERVES TO GET RID OF A HANGOVER: NO
TOTAL SCORE: 0
ALCOHOL_USE: NO
EVER FELT BAD OR GUILTY ABOUT YOUR DRINKING: NO
HAVE YOU EVER FELT YOU SHOULD CUT DOWN ON YOUR DRINKING: NO
HAVE PEOPLE ANNOYED YOU BY CRITICIZING YOUR DRINKING: NO

## 2024-12-30 ASSESSMENT — PATIENT HEALTH QUESTIONNAIRE - PHQ9
SUM OF ALL RESPONSES TO PHQ9 QUESTIONS 1 AND 2: 0
2. FEELING DOWN, DEPRESSED, IRRITABLE, OR HOPELESS: NOT AT ALL
1. LITTLE INTEREST OR PLEASURE IN DOING THINGS: NOT AT ALL

## 2024-12-30 ASSESSMENT — ENCOUNTER SYMPTOMS
WEIGHT LOSS: 1
VOMITING: 1
VOMITING: 0
NAUSEA: 1
NEUROLOGICAL NEGATIVE: 1
RESPIRATORY NEGATIVE: 1
COUGH: 0
CHILLS: 0
CARDIOVASCULAR NEGATIVE: 1
EYES NEGATIVE: 1
ABDOMINAL PAIN: 1
FEVER: 0
MUSCULOSKELETAL NEGATIVE: 1
SHORTNESS OF BREATH: 0

## 2024-12-30 ASSESSMENT — SOCIAL DETERMINANTS OF HEALTH (SDOH)
WITHIN THE PAST 12 MONTHS, YOU WORRIED THAT YOUR FOOD WOULD RUN OUT BEFORE YOU GOT THE MONEY TO BUY MORE: NEVER TRUE
WITHIN THE LAST YEAR, HAVE YOU BEEN KICKED, HIT, SLAPPED, OR OTHERWISE PHYSICALLY HURT BY YOUR PARTNER OR EX-PARTNER?: NO
WITHIN THE LAST YEAR, HAVE YOU BEEN AFRAID OF YOUR PARTNER OR EX-PARTNER?: NO
WITHIN THE LAST YEAR, HAVE YOU BEEN HUMILIATED OR EMOTIONALLY ABUSED IN OTHER WAYS BY YOUR PARTNER OR EX-PARTNER?: NO
IN THE PAST 12 MONTHS, HAS THE ELECTRIC, GAS, OIL, OR WATER COMPANY THREATENED TO SHUT OFF SERVICE IN YOUR HOME?: NO
WITHIN THE PAST 12 MONTHS, THE FOOD YOU BOUGHT JUST DIDN'T LAST AND YOU DIDN'T HAVE MONEY TO GET MORE: NEVER TRUE
WITHIN THE LAST YEAR, HAVE TO BEEN RAPED OR FORCED TO HAVE ANY KIND OF SEXUAL ACTIVITY BY YOUR PARTNER OR EX-PARTNER?: NO

## 2024-12-30 ASSESSMENT — FIBROSIS 4 INDEX
FIB4 SCORE: 0.81
FIB4 SCORE: 0.81
FIB4 SCORE: 2.35

## 2024-12-30 NOTE — PROGRESS NOTES
Problem: Falls - Risk of  Goal: *Absence of Falls  Description: Document Renea Cherry Fall Risk and appropriate interventions in the flowsheet.   Outcome: Progressing Towards Goal  Note: Fall Risk Interventions:  Mobility Interventions: Bed/chair exit alarm         Medication Interventions: Bed/chair exit alarm    Elimination Interventions: Bed/chair exit alarm              Problem: Patient Education: Go to Patient Education Activity  Goal: Patient/Family Education  Outcome: Progressing Towards Goal Report taken from Hilda CROWLEY in ER at this time

## 2024-12-30 NOTE — H&P
Hospital Medicine History & Physical Note    Date of Service  12/30/2024    Primary Care Physician  Christian German D.O.    Consultants  Hepatobiliary surgery     Code Status  Prior    Chief Complaint  Chief Complaint   Patient presents with    Abdominal Pain     Transfer from [facility]. Patient w/ hx of pancreatitis. Recent diagnosis of pancreatic duct stones. Patient had surgery schedule in mid January, but pain has become to severe.        History of Presenting Illness  Brenda Cartagena is a 39 y.o. female who presented 12/30/2024 with abdominal pain.  Ms. Cartagena has a past medical history of alcohol dependence and methamphetamine abuse last use was over 1 month ago who has had multiple admissions due to abdominal pain and pancreatitis.  In October she underwent EGD with gastritis and MRCP revealing pancreatic ductal dilation with stone and debris and a cystic lesion consistent with intraductal papillary mucinous neoplasm She subsequently underwent endoscopic ultrasound by Dr. Dawson gastroenterology revealing a stone lodged in the genu of the pancreas that was not able to be removed.  She stopped drinking and using methamphetamine and was evaluated by Dr. Byers hepatobiliary surgery.  She felt that her condition could be consistent with intraductal papillary mucinous neoplasm and recommended possible Puestow procedure to relieve the chronic pancreatitis but noted that she must have 6 months of complete sobriety from alcohol first.  She was discharged on 12/23/2024 with 5 days worth of oxycodone.  She ran out of the medications and in [place] there are no primary care providers taking new patients therefore presented to [facility] with a day and a half of severe abdominal pain and vomiting.  Lipase is normal labs are unremarkable.  I have contacted Dr. Esqueda hepatobiliary surgery who will evaluate her.  In the meantime she will be given IV fluids, pain control and her diet will be advanced if  tolerated.    If no intervention is going to be performed in this hospitalization which is most likely, I believe Ms. Cartagena may benefit from a month supply of pain medications will need to be set up with either the hepatobiliary clinic or one of the clinics in [facility] for refills until she can have definitive treatment of this pain.    I discussed the plan of care with Dr. Null.    Review of Systems  Review of Systems   Constitutional:  Negative for chills and fever.   Respiratory:  Negative for shortness of breath.    Cardiovascular:  Negative for chest pain.   Gastrointestinal:  Positive for abdominal pain, nausea and vomiting.       Past Medical History   has a past medical history of Indigestion (13 yrears).    Surgical History   has a past surgical history that includes incision and drainage orthopedic (5/9/2012); external fixator application (5/9/2012); tibia nailing intramedullary (5/11/2012); external fixator removal (5/11/2012); fasciotomy (5/13/2012); wound closure delayed (5/16/2012); irrigation & debridement ortho (5/18/2012); wound closure ortho (5/18/2012); orif, fracture, tibia (9/5/2012); tonsillectomy (Bilateral, 7/21/2015); and pr upper gi endoscopy,diagnosis (N/A, 11/12/2024).     Family History  family history is not on file.   Family history reviewed with patient. There is no family history that is pertinent to the chief complaint.     Social History   reports that she has been smoking cigarettes. She has a 1 pack-year smoking history. She does not have any smokeless tobacco history on file. She reports that she does not currently use alcohol. She reports that she does not currently use drugs after having used the following drugs: Inhaled.    Allergies  Allergies   Allergen Reactions    Peanut-Derived Swelling     Tongue swelling    Other Food Unspecified     Allergy to walnuts and almonds    Tree Nuts Food Allergy Swelling     All nuts        Medications  Prior to Admission Medications    Prescriptions Last Dose Informant Patient Reported? Taking?   DULoxetine (CYMBALTA) 60 MG Cap DR Particles delayed-release capsule   No No   Sig: Take 1 Capsule by mouth every day.   Naloxone (NARCAN) 4 MG/0.1ML Liquid   No No   Sig: Administer 4 mg into affected nostril(S) one time as needed (somulence due to narcotic overdose) for up to 1 dose.   docusate sodium (COLACE) 100 MG Cap   No No   Sig: Take 1 Capsule by mouth 2 times a day. To prevent constipation while taking narcotic pain medications.   omeprazole (PRILOSEC) 20 MG delayed-release capsule   No No   Sig: Take 1 Capsule by mouth 2 times a day.   polyethylene glycol/lytes (MIRALAX) Pack   No No   Sig: Take 1 Packet by mouth 2 times a day. To prevent constipation while taking narcotic pain medications.      Facility-Administered Medications: None       Physical Exam  Temp:  [37.1 °C (98.7 °F)] 37.1 °C (98.7 °F)  Pulse:  [] 97  Resp:  [16] 16  BP: ()/(59-78) 100/61  SpO2:  [93 %-98 %] 93 %  Blood Pressure: 100/61       Pulse: 97       Pulse Oximetry: 93 %       Physical Exam  Vitals and nursing note reviewed.   Constitutional:       General: She is not in acute distress.     Appearance: She is ill-appearing. She is not toxic-appearing.   Cardiovascular:      Rate and Rhythm: Normal rate and regular rhythm.   Pulmonary:      Effort: Pulmonary effort is normal.      Breath sounds: Normal breath sounds.   Abdominal:      Comments: Mid and left epigastric tenderness   Musculoskeletal:      Right lower leg: No edema.      Left lower leg: No edema.   Neurological:      General: No focal deficit present.      Mental Status: She is alert and oriented to person, place, and time.         Laboratory:  Recent Labs     12/30/24  0220   WBC 8.4   RBC 3.51*   HEMOGLOBIN 10.2*   HEMATOCRIT 31.8*   MCV 90.6   MCH 29.1   MCHC 32.1*   RDW 50.2*   PLATELETCT 383   MPV 10.7     Recent Labs     12/30/24 0220   SODIUM 136   POTASSIUM 3.6   CHLORIDE 100   CO2 24  "  GLUCOSE 121*   BUN 14   CREATININE 0.44*   CALCIUM 9.0     Recent Labs     12/30/24  0220   ALTSGPT 11   ASTSGOT 25   ALKPHOSPHAT 82   TBILIRUBIN 0.4   LIPASE 47   GLUCOSE 121*         No results for input(s): \"NTPROBNP\" in the last 72 hours.      No results for input(s): \"TROPONINT\" in the last 72 hours.    Imaging:  No orders to display           Assessment/Plan:  Justification for Admission Status  I anticipate this patient is appropriate for observation status at this time because pain control and surgical evaluation    Patient will need a Med/Surg bed on MEDICAL service .  The need is secondary to as above.    * Abdominal pain- (present on admission)  Assessment & Plan  Complex situation for which she has undergone EGD multiple imaging studies and most consistent with intraductal papillary mucinous neoplasm with chronic pancreatic pain.  Should be placed under observation status for fluids and pain control.  Diet will be advanced if tolerated.  If no intervention is performed then she may be discharged on oral narcotic medications but likely will need a 30-day supply and will need to have a way of getting refills either through the hepatobiliary office or one of the clinics here in town.    IPMN (intraductal papillary mucinous neoplasm)- (present on admission)  Assessment & Plan  She has been seen by Dr. Byers for consideration of Puestow procedure but needs 6 months of sobriety.  She has not drank in over 1 month.  Dr. Esqueda hepatobiliary surgery will be consulted    Pancreatic duct stones- (present on admission)  Assessment & Plan  Chronic condition        VTE prophylaxis: SCDs/TEDs  "

## 2024-12-30 NOTE — ED TRIAGE NOTES
"Chief Complaint   Patient presents with    Abdominal Pain    Nausea     Pt was admitted to [facility] on 12/17, for stone in her pancreas. Pt was discharge and followed up with surgery and her surgery date is 1/17. Patient can not take the pain. Pt ran out of pain medication prescribe and can not get in to PCP for medication. Pt is nausea constantly but hasn't thrown up in a day.      /78   Pulse (!) 113   Temp 37.1 °C (98.7 °F) (Temporal)   Resp 16   Ht 1.6 m (5' 3\")   Wt 48.7 kg (107 lb 5.8 oz)   LMP 12/22/2024 (Approximate)   SpO2 98%   BMI 19.02 kg/m²       "

## 2024-12-30 NOTE — CONSULTS
Date of Service  December 30, 2024     Reason for Consult: Abdominal pain hx of IPMN    Requested by: Esteban Stovall M.D.     Location: [facility]    Chief Complaint  Abdominal Pain (Transfer from [facility]. Patient w/ hx of pancreatitis. Recent diagnosis of pancreatic duct stones. Patient had surgery schedule in mid January, but pain has become to severe. )        HPI: Patient is a 39 y.o. female who presented to the ED today 12/30/2024 with abdominal pain. PMH significant for alcohol dependence and methamphetamine abuse last use was over 1 month ago who has had multiple admissions due to abdominal pain and pancreatitis. In October she underwent EGD with gastritis and MRCP revealing pancreatic ductal dilation with stone and debris and a cystic lesion consistent with intraductal papillary mucinous neoplasm She subsequently underwent endoscopic ultrasound by Dr. Dawson gastroenterology revealing a stone lodged in the genu of the pancreas that was not able to be removed.  She stopped drinking and using methamphetamine and was evaluated by Dr. Byers during her most recent hospital admission. Plan at the time was to proceed with possible Puestow procedure to relieve the chronic pancreatitis but noted that she must have 6 months of complete sobriety from alcohol first.  She was discharged on 12/23/2024 with 5 days worth of oxycodone. She ran out of the medications and was unable to obtain refills in [place] d/ no available PCPs therefore she presented to [facility] d/t severe abdominal pain and vomiting.     This afternoon she is resting comfortably in bed. Reports continued abdominal pain with intermittent nausea, however is tolerating a regular diet. She also reports that she has remained sober. Labs this AM noted mild anemia and were otherwise unremarkable.    Past Medical History  Past Medical History:   Diagnosis Date    Indigestion 13 yrears    heart burn       Past Surgical History  Past Surgical  History:   Procedure Laterality Date    OH UPPER GI ENDOSCOPY,DIAGNOSIS N/A 11/12/2024    Procedure: GASTROSCOPY;  Surgeon: Umesh Gardner D.O.;  Location: SURGERY [facility];  Service: Gastroenterology    TONSILLECTOMY Bilateral 7/21/2015    Procedure: TONSILLECTOMY;  Surgeon: Jaclyn North M.D.;  Location: [facility];  Service:     ORIF, FRACTURE, TIBIA  9/5/2012    Performed by BOBO BRITT at [facility]    IRRIGATION & DEBRIDEMENT ORTHO  5/18/2012    Performed by BOBO BRITT at [facility]    WOUND CLOSURE ORTHO  5/18/2012    Performed by BOBO BRITT at [facility]    WOUND CLOSURE DELAYED  5/16/2012    Performed by BOBO BRITT at [facility]    FASCIOTOMY  5/13/2012    Performed by ANN LEBRON at [facility]    TIBIA NAILING INTRAMEDULLARY  5/11/2012    Performed by BOBO BRITT at [facility]    EXTERNAL FIXATOR REMOVAL  5/11/2012    Performed by BOBO BRITT at [facility]    INCISION AND DRAINAGE ORTHOPEDIC  5/9/2012    Performed by TRICIA SOLIZ at [facility]    EXTERNAL FIXATOR APPLICATION  5/9/2012    Performed by TRICIA SOLIZ at [facility]     Allergies:   Allergies   Allergen Reactions    Peanut-Derived Swelling     Tongue swelling    Other Food Unspecified     Allergy to walnuts and almonds    Tree Nuts Food Allergy Swelling     All nuts        Problem List  Principal Problem:    Abdominal pain (POA: Yes)  Active Problems:    Pancreatic duct stones (POA: Yes)    IPMN (intraductal papillary mucinous neoplasm) (POA: Yes)  Resolved Problems:    * No resolved hospital problems. *       Subjective  Review of Systems   Constitutional:  Positive for malaise/fatigue. Negative for chills and fever.   Respiratory:  Negative for cough and shortness of breath.    Cardiovascular:  Negative for chest pain.   Gastrointestinal:  Positive for  abdominal pain and nausea (Intermittent). Negative for vomiting.         Objective  Temp:  [36.6 °C (97.9 °F)] 36.6 °C (97.9 °F)  Pulse:  [94-97] 94  Resp:  [16] 16  BP: (100-106)/(61-70) 103/61  SpO2:  [93 %-96 %] 96 %      Physical Exam  Vitals and nursing note reviewed.   Constitutional:       General: She is not in acute distress.     Appearance: Normal appearance. She is not ill-appearing.   HENT:      Head: Normocephalic and atraumatic.   Eyes:      General: No scleral icterus.     Conjunctiva/sclera: Conjunctivae normal.   Cardiovascular:      Rate and Rhythm: Normal rate.   Pulmonary:      Effort: Pulmonary effort is normal. No respiratory distress.   Abdominal:      General: There is no distension.      Palpations: Abdomen is soft.      Tenderness: There is abdominal tenderness. There is no guarding.   Skin:     General: Skin is warm and dry.      Coloration: Skin is not jaundiced.   Neurological:      General: No focal deficit present.      Mental Status: She is alert and oriented to person, place, and time.   Psychiatric:         Mood and Affect: Mood normal.         Behavior: Behavior normal.          Fluids  No intake or output data in the 24 hours ending 12/30/24 1354      Labs  Lab Results   Component Value Date/Time    SODIUM 136 12/30/2024 02:20 AM    POTASSIUM 3.6 12/30/2024 02:20 AM    CHLORIDE 100 12/30/2024 02:20 AM    CO2 24 12/30/2024 02:20 AM    GLUCOSE 121 (H) 12/30/2024 02:20 AM    BUN 14 12/30/2024 02:20 AM    CREATININE 0.44 (L) 12/30/2024 02:20 AM         Lab Results   Component Value Date/Time    PROTHROMBTM 14.3 02/20/2014 08:50 AM    INR 1.12 02/20/2014 08:50 AM         Lab Results   Component Value Date/Time    WBC 8.4 12/30/2024 02:20 AM    RBC 3.51 (L) 12/30/2024 02:20 AM    HEMOGLOBIN 10.2 (L) 12/30/2024 02:20 AM    HEMATOCRIT 31.8 (L) 12/30/2024 02:20 AM    MCV 90.6 12/30/2024 02:20 AM    MCH 29.1 12/30/2024 02:20 AM    MCHC 32.1 (L) 12/30/2024 02:20 AM    MPV 10.7 12/30/2024  02:20 AM    NEUTSPOLYS 64.70 12/30/2024 02:20 AM    LYMPHOCYTES 25.70 12/30/2024 02:20 AM    MONOCYTES 6.90 12/30/2024 02:20 AM    EOSINOPHILS 1.80 12/30/2024 02:20 AM    BASOPHILS 0.50 12/30/2024 02:20 AM         Recent Labs     12/30/24  0220   ASTSGOT 25   ALTSGPT 11   TBILIRUBIN 0.4   GLOBULIN 3.9*        Imaging  CT PANCREAS 12/17/24 (previous admission)  IMPRESSION:  1.  Heterogeneous lesion in the pancreatic head with dilatation the pancreatic duct concerning for pancreatic mass lesion, recommend follow-up MRI pancreas for further characterization.  2.  Multiple intermediate density soft tissue masses along the greater curvature of the stomach, appearance most compatible with underlying neoplastic disease, component of hemorrhage is not excluded given density.  3.  Hepatomegaly    Pathology  N/A    Procedures  N/A     Assessment and Plan  Patient is a 39 year-old female familiar to our service with PMH of alcohol dependence, methamphetamine use, chronic pancreatitis, and IPMN who presented to the ED on 12/30/24 for severe abdominal pain with associated vomiting.    Abdominal pain hx of IPMN  - Obtain new CT PANCREAS, ordered  - GI consulted, plan for EUS, ERCP, stent placement, and possible celiac plexus block this upcoming Wed 1/1/25  - Continue regular diet in the meantime  - Continue pain meds PRN  - Continue antiemetics PRN    The assessment and plan discussed with Dr. Stovall. Seen with Dr. Esqueda. Surgical oncology will continue to follow.

## 2024-12-30 NOTE — ED PROVIDER NOTES
ED Provider Note    CHIEF COMPLAINT  Chief Complaint   Patient presents with    Abdominal Pain    Nausea     Pt was admitted to [facility] on , for stone in her pancreas. Pt was discharge and followed up with surgery and her surgery date is . Patient can not take the pain. Pt ran out of pain medication prescribe and can not get in to PCP for medication. Pt is nausea constantly but hasn't thrown up in a day.        EXTERNAL RECORDS REVIEWED  Inpatient Notes from 1217 2028.  Patient was admitted to the hospital for chronic pancreatitis, complex pancreatic head mass, pancreatic ductal stones.  She was seen by multiple specialist including gastroenterology and hepatobiliary surgery.  Patient's stay was complicated by intractable abdominal pain, was on IV narcotics for most of her stay, was weaned and discharged with oral Dilaudid.  Patient was discharged with oral pain medications.  She was instructed to follow-up for possible procedure in January.  Patient's pain medications  2 days ago.    HPI/ROS  LIMITATION TO HISTORY   Select: : None  OUTSIDE HISTORIAN(S):  Significant other who is at bedside    Brenda Cartagena is a 39 y.o. female who presents to the emergency department with acute on chronic epigastric abdominal pain associated with poor oral intake, nausea and vomiting.  Patient was recently discharged from the hospital where she has been dealing with chronic pancreatitis, pancreatic duct stones, lower pancreatic mass.  Patient had a prolonged hospitalization, is requiring IV narcotics in addition to oral narcotics for intractable abdominal pain.  She was discharged with oral Dilaudid, she only received a 5-day course and after she ran out of her medications yesterday she states that she had a return of her abdominal pain, since then has not been able to tolerate anything orally.  She denies any diarrhea, bloody or bilious emesis.  Denies any urinary symptoms.    PAST MEDICAL  HISTORY   has a past medical history of Indigestion (13 yrears).    SURGICAL HISTORY   has a past surgical history that includes incision and drainage orthopedic (5/9/2012); external fixator application (5/9/2012); tibia nailing intramedullary (5/11/2012); external fixator removal (5/11/2012); fasciotomy (5/13/2012); wound closure delayed (5/16/2012); irrigation & debridement ortho (5/18/2012); wound closure ortho (5/18/2012); orif, fracture, tibia (9/5/2012); tonsillectomy (Bilateral, 7/21/2015); and upper gi endoscopy,diagnosis (N/A, 11/12/2024).    FAMILY HISTORY  History reviewed. No pertinent family history.    SOCIAL HISTORY  Social History     Tobacco Use    Smoking status: Every Day     Current packs/day: 0.50     Average packs/day: 0.5 packs/day for 2.0 years (1.0 ttl pk-yrs)     Types: Cigarettes    Smokeless tobacco: Not on file   Substance and Sexual Activity    Alcohol use: Not Currently     Comment: every day 2-3 glasses of an alcholic beverage ,Vodka    Drug use: Not Currently     Types: Inhaled     Comment: 2007 to 2008 Meth. used Meth last Feb 10 for one day as stated by pt    Sexual activity: Not on file       CURRENT MEDICATIONS  Home Medications       Reviewed by Faustino Waldron R.N. (Registered Nurse) on 12/30/24 at 0204  Med List Status: Partial     Medication Last Dose Status   docusate sodium (COLACE) 100 MG Cap  Active   DULoxetine (CYMBALTA) 60 MG Cap DR Particles delayed-release capsule  Active   Naloxone (NARCAN) 4 MG/0.1ML Liquid  Active   omeprazole (PRILOSEC) 20 MG delayed-release capsule  Active   polyethylene glycol/lytes (MIRALAX) Pack  Active                    ALLERGIES  Allergies   Allergen Reactions    Peanut-Derived Swelling     Tongue swelling    Other Food Unspecified     Allergy to walnuts and almonds    Tree Nuts Food Allergy Swelling     All nuts        PHYSICAL EXAM  VITAL SIGNS: /78   Pulse (!) 113   Temp 37.1 °C (98.7 °F) (Temporal)   Resp 16   Ht 1.6 m (5'  "3\")   Wt 48.7 kg (107 lb 5.8 oz)   LMP 12/22/2024 (Approximate)   SpO2 98%   BMI 19.02 kg/m²    Constitutional: Awake and alert  HENT: Normal inspection  Eyes: Normal inspection  Neck: Grossly normal range of motion.  Cardiovascular: Normal heart rate, Normal rhythm.  Symmetric peripheral pulses.   Thorax & Lungs: No respiratory distress, No wheezing, No rales, No rhonchi, No chest tenderness.   Abdomen: Bowel sounds normal, soft, non-distended, tenderness to palpation in the epigastric region and bilateral upper quadrants, no mass  Skin: No obvious rash.  Back: No tenderness, No CVA tenderness.   Extremities: No clubbing, cyanosis, edema, no Homans or cords.  Neurologic: Grossly normal   Psychiatric: Normal for situation      EKG/LABS  Results for orders placed or performed during the hospital encounter of 12/30/24   CBC WITH DIFFERENTIAL    Collection Time: 12/30/24  2:20 AM   Result Value Ref Range    WBC 8.4 4.8 - 10.8 K/uL    RBC 3.51 (L) 4.20 - 5.40 M/uL    Hemoglobin 10.2 (L) 12.0 - 16.0 g/dL    Hematocrit 31.8 (L) 37.0 - 47.0 %    MCV 90.6 81.4 - 97.8 fL    MCH 29.1 27.0 - 33.0 pg    MCHC 32.1 (L) 32.2 - 35.5 g/dL    RDW 50.2 (H) 35.9 - 50.0 fL    Platelet Count 383 164 - 446 K/uL    MPV 10.7 9.0 - 12.9 fL    Neutrophils-Polys 64.70 44.00 - 72.00 %    Lymphocytes 25.70 22.00 - 41.00 %    Monocytes 6.90 0.00 - 13.40 %    Eosinophils 1.80 0.00 - 6.90 %    Basophils 0.50 0.00 - 1.80 %    Immature Granulocytes 0.40 0.00 - 0.90 %    Nucleated RBC 0.00 0.00 - 0.20 /100 WBC    Neutrophils (Absolute) 5.43 1.82 - 7.42 K/uL    Lymphs (Absolute) 2.15 1.00 - 4.80 K/uL    Monos (Absolute) 0.58 0.00 - 0.85 K/uL    Eos (Absolute) 0.15 0.00 - 0.51 K/uL    Baso (Absolute) 0.04 0.00 - 0.12 K/uL    Immature Granulocytes (abs) 0.03 0.00 - 0.11 K/uL    NRBC (Absolute) 0.00 K/uL   COMP METABOLIC PANEL    Collection Time: 12/30/24  2:20 AM   Result Value Ref Range    Sodium 136 135 - 145 mmol/L    Potassium 3.6 3.6 - 5.5 " mmol/L    Chloride 100 96 - 112 mmol/L    Co2 24 20 - 33 mmol/L    Anion Gap 12.0 7.0 - 16.0    Glucose 121 (H) 65 - 99 mg/dL    Bun 14 8 - 22 mg/dL    Creatinine 0.44 (L) 0.50 - 1.40 mg/dL    Calcium 9.0 8.4 - 10.2 mg/dL    Correct Calcium 9.5 8.5 - 10.5 mg/dL    AST(SGOT) 25 12 - 45 U/L    ALT(SGPT) 11 2 - 50 U/L    Alkaline Phosphatase 82 30 - 99 U/L    Total Bilirubin 0.4 0.1 - 1.5 mg/dL    Albumin 3.4 3.2 - 4.9 g/dL    Total Protein 7.3 6.0 - 8.2 g/dL    Globulin 3.9 (H) 1.9 - 3.5 g/dL    A-G Ratio 0.9 g/dL   LIPASE    Collection Time: 12/30/24  2:20 AM   Result Value Ref Range    Lipase 47 11 - 82 U/L   MAGNESIUM    Collection Time: 12/30/24  2:20 AM   Result Value Ref Range    Magnesium 1.8 1.5 - 2.5 mg/dL   ESTIMATED GFR    Collection Time: 12/30/24  2:20 AM   Result Value Ref Range    GFR (CKD-EPI) 124 >60 mL/min/1.73 m 2           COURSE & MEDICAL DECISION MAKING    ASSESSMENT, COURSE AND PLAN  Care Narrative: Patient is a 39-year-old female with history of pancreatic mass, pancreatic stones, chronic pancreatitis presenting to the emergency department with abdominal pain, nausea, vomiting, anorexia after running out of pain medications at home.  Patient arrived and was mildly tachycardic, did have some tenderness to palpation in the epigastric region and bilateral upper quadrants.  Was also having some nonbloody/nonbilious emesis.  Denies any diarrhea.  Patient denies any alcohol or other drug use since discharge.  Will give patient analgesics, IV fluids for associated.  Will repeat CBC, CMP, lipase and reassess.    Labs reviewed.  Lipase normal, normal electrolytes.  Largely normal liver enzymes.    3:06 AM  Patient reassessed.  Continues to have abdominal pain, still having significant epigastric tenderness to palpation.  Still nauseous.  Will order additional analgesics and antiemetics.    4:15 AM  Patient reassessed after additional dose of Dilaudid as well as separate antiemetic.  Patient still having  some nausea with just swallowing saliva, not tolerating p.o.'s here.  Patient still having significant abdominal pain.    4:40 AM   Spoke with Dr. Farrar who is hesitant at this time due to patient's medical complexity and potential lack of specialist coverage should it be needed.  I will reach out to on-call gastroenterologist.    4:56 AM  Spoke with gastroenterologist Dr. Javier who recommends we transfer patient to the main hospital in case she needs any urgent hepatobiliary intervention.  Patient agreeable.  Will work on transfer.  Patient started on low-dose ketamine for intractable pain.    5:18 AM  Spoke with ED physician Dr. Null who graciously accepts ED to ED transfer for intractable abdominal pain.          ADDITIONAL PROBLEMS MANAGED  Chronic abdominal pain    DISPOSITION AND DISCUSSIONS  I have discussed management of the patient with the following physicians and NEIL's: Hospitalist Dr. Davison, ERP Dr. Null, gastroenterologist Dr. Javier    Discussion of management with other [facility] or appropriate source(s): None       FINAL DIAGNOSIS  1. Intractable abdominal pain Acute   2. Nausea and vomiting, unspecified vomiting type Acute   3. Pancreatic duct stones Active   4. Pancreatic mass Active        Electronically signed by: Nick Zurita M.D., 12/30/2024 2:07 AM

## 2024-12-30 NOTE — ASSESSMENT & PLAN NOTE
Complex situation for which she has undergone EGD multiple imaging studies and most consistent with intraductal papillary mucinous neoplasm with chronic pancreatic pain.  Should be placed under observation status for fluids and pain control.  Diet will be advanced if tolerated.  If no intervention is performed then she may be discharged on oral narcotic medications but likely will need a 30-day supply and will need to have a way of getting refills either through the hepatobiliary office or one of the clinics here in town.  I have adjusted analgesia back today given her drowsiness, continue to assess and adjust as needed   Patient is a 76y old  Male who presents with a chief complaint of hypotension (06 Jul 2024 10:53)      SUBJECTIVE / OVERNIGHT EVENTS: Patient seen and examined at bedside. States that he feels well denies any complaints currently, however states he got very lightheaded upon standing this morning with the nursing staff.     ROS:  All other review of systems negative    Allergies    No Known Allergies    Intolerances        MEDICATIONS  (STANDING):  allopurinol 100 milliGRAM(s) Oral two times a day  apixaban 5 milliGRAM(s) Oral every 12 hours  ascorbic acid 500 milliGRAM(s) Oral daily  atorvastatin 80 milliGRAM(s) Oral at bedtime  clopidogrel Tablet 75 milliGRAM(s) Oral daily  colchicine 0.6 milliGRAM(s) Oral daily  folic acid 1 milliGRAM(s) Oral daily  lactated ringers. 1000 milliLiter(s) (75 mL/Hr) IV Continuous <Continuous>  midodrine. 20 milliGRAM(s) Oral three times a day  multivitamin 1 Tablet(s) Oral daily  sodium chloride 0.9%. 1000 milliLiter(s) (50 mL/Hr) IV Continuous <Continuous>  sotalol. 40 milliGRAM(s) Oral every 24 hours  thiamine 100 milliGRAM(s) Oral daily    MEDICATIONS  (PRN):  acetaminophen     Tablet .. 650 milliGRAM(s) Oral every 6 hours PRN Temp greater or equal to 38C (100.4F), Mild Pain (1 - 3)  ondansetron Injectable 4 milliGRAM(s) IV Push every 8 hours PRN Nausea and/or Vomiting      Vital Signs Last 24 Hrs  T(C): 36.4 (06 Jul 2024 11:44), Max: 36.9 (05 Jul 2024 19:48)  T(F): 97.5 (06 Jul 2024 11:44), Max: 98.4 (05 Jul 2024 19:48)  HR: 79 (06 Jul 2024 11:44) (68 - 91)  BP: 110/56 (06 Jul 2024 11:44) (100/61 - 119/76)  BP(mean): --  RR: 18 (06 Jul 2024 11:44) (18 - 18)  SpO2: 96% (06 Jul 2024 11:44) (95% - 98%)    Parameters below as of 06 Jul 2024 11:44  Patient On (Oxygen Delivery Method): room air      CAPILLARY BLOOD GLUCOSE        I&O's Summary    05 Jul 2024 07:01  -  06 Jul 2024 07:00  --------------------------------------------------------  IN: 1885 mL / OUT: 800 mL / NET: 1085 mL    06 Jul 2024 07:01  -  06 Jul 2024 12:18  --------------------------------------------------------  IN: 240 mL / OUT: 0 mL / NET: 240 mL        PHYSICAL EXAM:  GENERAL: elderly  HEAD:  Atraumatic, Normocephalic  EYES: EOMI, PERRLA, conjunctiva and sclera clear  NECK: Supple, No JVD  CHEST/LUNG: Clear to auscultation bilaterally; No wheeze  HEART: Regular rate and rhythm; No murmurs, rubs, or gallops  ABDOMEN: Soft, Nontender, Nondistended; Bowel sounds present  EXTREMITIES:  2+ Peripheral Pulses, No clubbing, cyanosis, or edema  NEUROLOGY: AAOx3, non-focal      LABS:                        11.1   9.13  )-----------( 330      ( 05 Jul 2024 07:17 )             34.1     07-05    140  |  106  |  22  ----------------------------<  106<H>  3.8   |  17<L>  |  0.99    Ca    9.4      05 Jul 2024 07:17            Urinalysis Basic - ( 05 Jul 2024 07:17 )    Color: x / Appearance: x / SG: x / pH: x  Gluc: 106 mg/dL / Ketone: x  / Bili: x / Urobili: x   Blood: x / Protein: x / Nitrite: x   Leuk Esterase: x / RBC: x / WBC x   Sq Epi: x / Non Sq Epi: x / Bacteria: x        RADIOLOGY & ADDITIONAL TESTS:        Care Discussed with Consultants/Other Providers: Medicine ACP and Dr. Hayes

## 2024-12-30 NOTE — ASSESSMENT & PLAN NOTE
She has been seen by Dr. Byers for consideration of [place] procedure but needs 6 months of sobriety.  She has not drank in over 1 month.  hepatobiliary surgery has been reconsulted   Pancreatic CT ordered and reviewed  GI consulted, I discussed case with team, planning for ERCP w/ potential stent/EUS/ciliac block, procedure planned for Friday 1/3/2025

## 2024-12-30 NOTE — ED TRIAGE NOTES
Chief Complaint   Patient presents with    Abdominal Pain     Transfer from [facility]. Patient w/ hx of pancreatitis. Recent diagnosis of pancreatic duct stones. Patient had surgery schedule in mid January, but pain has become to severe.

## 2024-12-30 NOTE — ED PROVIDER NOTES
ER Provider Note    Scribed for Honorio Null Ii, M.d. by Dudley Zhao. 12/30/2024  6:28 AM    Primary Care Provider: Christian German D.O.    CHIEF COMPLAINT   Chief Complaint   Patient presents with    Abdominal Pain     Transfer from [facility]. Patient w/ hx of pancreatitis. Recent diagnosis of pancreatic duct stones. Patient had surgery schedule in mid January, but pain has become to severe.      EXTERNAL RECORDS REVIEWED  [facility] ER notes. Labs within acceptable limits. Consults with hospitalist and GI recommended transfer to our facility where hepatobiliary services are available.     HPI/ROS  LIMITATION TO HISTORY   Select: : None  OUTSIDE HISTORIAN(S):  None    Brenda Cartagena is a 39 y.o. female who presents as transfer from [facility] ER to our department for uncontrolled abdominal pain. She has associated symptoms of nausea and vomiting. She was hospitalized earlier this month for uncontrolled abdominal pain. Found to have duct dilation and attempts made by GI to remove stone but unable to do so. There are also concerns for a pancreatic mass that the hepatobiliary service has been following. She has follow up plan for procedure with hepatobiliary this coming month. She notes she was discharged with a 5 day course of oral Dilaudid and ran out of it yesterday. Since then she has been experiencing abdominal pain, describing it as a consistent dull pain that occasionally has sharp jabbing episodes. she is not able to tolerate eating or drinking anything. Denies diarrhea or urinary symptoms. At outside facility this morning she presented with intractable pain. Unable to be hospitalized there due to limited services. Transferred to our facility for further pain control and possible consultation with GI/Hepatobiliary.    PAST MEDICAL HISTORY  Past Medical History:   Diagnosis Date    Indigestion 13 yrears    heart burn       SURGICAL HISTORY  Past Surgical History:   Procedure Laterality Date     CO UPPER GI ENDOSCOPY,DIAGNOSIS N/A 11/12/2024    Procedure: GASTROSCOPY;  Surgeon: Umesh Gardner D.O.;  Location: SURGERY [facility];  Service: Gastroenterology    TONSILLECTOMY Bilateral 7/21/2015    Procedure: TONSILLECTOMY;  Surgeon: Jaclyn North M.D.;  Location: [facility];  Service:     ORIF, FRACTURE, TIBIA  9/5/2012    Performed by BOBO BIRTT at [facility]    IRRIGATION & DEBRIDEMENT ORTHO  5/18/2012    Performed by BOBO BRITT at [facility]    WOUND CLOSURE ORTHO  5/18/2012    Performed by BOBO BRITT at [facility]    WOUND CLOSURE DELAYED  5/16/2012    Performed by BOBO BRITT at [facility]    FASCIOTOMY  5/13/2012    Performed by ANN LEBRON at [facility]    TIBIA NAILING INTRAMEDULLARY  5/11/2012    Performed by BOBO BRITT at [facility]    EXTERNAL FIXATOR REMOVAL  5/11/2012    Performed by BOBO BRITT at [facility]    INCISION AND DRAINAGE ORTHOPEDIC  5/9/2012    Performed by TRICIA SOLIZ at [facility]    EXTERNAL FIXATOR APPLICATION  5/9/2012    Performed by TRICIA SOLIZ at [facility]       FAMILY HISTORY  History reviewed. No pertinent family history.    SOCIAL HISTORY   reports that she has been smoking cigarettes. She has a 1 pack-year smoking history. She does not have any smokeless tobacco history on file. She reports that she does not currently use alcohol. She reports that she does not currently use drugs after having used the following drugs: Inhaled.    CURRENT MEDICATIONS  Previous Medications    DOCUSATE SODIUM (COLACE) 100 MG CAP    Take 1 Capsule by mouth 2 times a day. To prevent constipation while taking narcotic pain medications.    DULOXETINE (CYMBALTA) 60 MG CAP DR PARTICLES DELAYED-RELEASE CAPSULE    Take 1 Capsule by mouth every day.    NALOXONE (NARCAN) 4 MG/0.1ML LIQUID    Administer 4 mg into  "affected nostril(S) one time as needed (somulence due to narcotic overdose) for up to 1 dose.    OMEPRAZOLE (PRILOSEC) 20 MG DELAYED-RELEASE CAPSULE    Take 1 Capsule by mouth 2 times a day.    POLYETHYLENE GLYCOL/LYTES (MIRALAX) PACK    Take 1 Packet by mouth 2 times a day. To prevent constipation while taking narcotic pain medications.       ALLERGIES  Peanut-derived, Other food, and Tree nuts food allergy    PHYSICAL EXAM  Ht 1.6 m (5' 3\")   Wt 48.5 kg (107 lb)   LMP 12/22/2024 (Approximate)   BMI 18.95 kg/m²   Physical Exam  Vitals and nursing note reviewed.   Constitutional:       Appearance: Normal appearance.   HENT:      Head: Normocephalic.      Mouth/Throat:      Mouth: Mucous membranes are moist.   Eyes:      General: No scleral icterus.     Pupils: Pupils are equal, round, and reactive to light.   Cardiovascular:      Rate and Rhythm: Normal rate and regular rhythm.   Pulmonary:      Effort: Pulmonary effort is normal.      Breath sounds: Normal breath sounds.   Abdominal:      Comments: Midabdominal tenderness   Neurological:      General: No focal deficit present.      Mental Status: She is alert.       COURSE & MEDICAL DECISION MAKING     ASSESSMENT, COURSE AND PLAN  Care Narrative: This is an emergency evaluation of a 39 year old female who is a transfer from [facility]. She was recently discharged from admission for pancreatitis. She was prescribed Dilaudid which she ran out of yesterday. Since then her abdominal pain returned and has been becoming more severe. I will medicate with Dilaudid injection 1 mg. I plan for admission. I have reviewed outside notes. She would have been directly admitted to hospital but no beds immediately available to do so which is why she has presented through our ER. She was agreeable with plan for admission here.     PROBLEM LIST  #intractable abdominal pain    #Pancreatic stones and possible Intraductal papillary mucinous neoplasm (IPMN)    DISPOSITION AND " DISCUSSIONS  I have discussed management of the patient with the following physicians and NEIL's:  Wilman (hospitalist), will admit      DISPOSITION:  Patient will be hospitalized by Dr. Stovall in stable condition.     FINAL DIANGOSIS  1. Pain of upper abdomen    2. Pancreatic duct stones        Dudley WILKINS (Scribe), am scribing for, and in the presence of, BJ Kline II.    Electronically signed by: Dudley Zhao (Scribe), 12/30/2024    IHonorio II, M* personally performed the services described in this documentation, as scribed by Dudley Zhao in my presence, and it is both accurate and complete.     The note accurately reflects work and decisions made by me.  Honorio Null II, M.D.  12/30/2024  7:34 AM

## 2024-12-30 NOTE — PROGRESS NOTES
Patient arrived to CDU at this time, in room 211. Ambulatory from rney to bed with 1 assist for safety. A+Ox4, medical patient. Oriented to unit, orders reviewed and released

## 2024-12-30 NOTE — ED NOTES
Med Rec complete per patient   Allergies reviewed  Antibiotics in the past 30 days:no  Anticoagulant in past 14 days:no  Pharmacy patient utilizes:[facility] in [place]

## 2024-12-30 NOTE — CONSULTS
Gastroenterology Initial Consult Note               Author:  Stacie Sen M.D. Date & Time Created: 12/30/2024 12:42 PM       Patient ID:  Name:             Brenda Cartagena  YOB: 1985  Age:                 39 y.o.  female  MRN:               [id number]      Referring Provider:  Renato Esqueda MD        Presenting Chief Complaint:  Intractable abdominal pain      History of Present Illness:    12/18/2024:  This is a 39 y.o. female transferred from [facility] [place] operated by [facility] for a pancreatic duct stone with abdominal pain, nausea and vomiting.       She was recently admitted to [facility] in November for pancreatic mass versus pseudocyst and underwent MRI which did confirm pancreatic ductal dilation with stones and debris, cystic lesion with internal debris and/or stones in the distal pancreatic body 15mm, IPMN favored.  Seen by [facility] and on 11/12/24 and underwent EGD and was found to have mild erosive esophagitis and gastric erosions negative for H pylori.  She underwent EUS about 4 weeks ago at [facility] with Dr. Dawson:   pancreatic duct stone 4mm in a dilated portion of the PD in the genu of the pancreas that may be an early main duct IPMN but the downstream portion is too small to remove stone and therefore cannot be done endoscopically and Puestow recommended.   She was referred to Surgical Oncology for stone removal.  Surgery could not be scheduled for a few months and [facility], [place] operated by [facility] contacted Dr. Byers, Hepatobiliary surgery.  Patient seen by us and we consulted Hepatobiliary Surgery.  Their recommendation was 6 months sobriety with recommendation for Pain Management and follow up with Dr. Dawson for pancreatic stent placement. Discharged home 12/23/24.    Presented today to [facility] with intractable pain but they were not willing to have her evaluated by Dr. Dawson or his partners of [facility] for pancreatic stent and so she was sent to [facility].  Dr. Stovall  discussed case with Dr. Esqueda who discussed case with Dr. Herrera for attempt at pancreatic stent.  Also discussed possibility of celiac plexus block.    Patient has been alcohol free x 1 month but difficulty with pain control.  Has lost 8 pounds this past month.             Review of Systems:  Review of Systems   Constitutional:  Positive for malaise/fatigue and weight loss. Negative for chills and fever.   HENT: Negative.     Eyes: Negative.    Respiratory: Negative.     Cardiovascular: Negative.    Gastrointestinal:  Positive for abdominal pain.   Genitourinary: Negative.    Musculoskeletal: Negative.    Skin: Negative.    Neurological: Negative.    Endo/Heme/Allergies: Negative.              Past Medical History:  Past Medical History:   Diagnosis Date    Indigestion 13 yrears    heart burn     Active Hospital Problems    Diagnosis     Pancreatic duct stones [K86.89]     IPMN (intraductal papillary mucinous neoplasm) [D49.0]     Abdominal pain [R10.9]          Past Surgical History:  Past Surgical History:   Procedure Laterality Date    IN UPPER GI ENDOSCOPY,DIAGNOSIS N/A 11/12/2024    Procedure: GASTROSCOPY;  Surgeon: Umesh Gardner D.O.;  Location: SURGERY [facility];  Service: Gastroenterology    TONSILLECTOMY Bilateral 7/21/2015    Procedure: TONSILLECTOMY;  Surgeon: Jaclyn North M.D.;  Location: [facility];  Service:     ORIF, FRACTURE, TIBIA  9/5/2012    Performed by BOBO BRITT at [facility]    IRRIGATION & DEBRIDEMENT ORTHO  5/18/2012    Performed by BOBO BRITT at [facility]    WOUND CLOSURE ORTHO  5/18/2012    Performed by BOBO BRITT at [facility]    WOUND CLOSURE DELAYED  5/16/2012    Performed by BOBO BRITT at [facility]    FASCIOTOMY  5/13/2012    Performed by ANN LEBRON at [facility]    TIBIA NAILING INTRAMEDULLARY  5/11/2012    Performed by BOBO BRITT at [facility]    EXTERNAL FIXATOR REMOVAL  5/11/2012    Performed by BOBO BRITT at SURGERY [facility]    INCISION AND DRAINAGE ORTHOPEDIC  5/9/2012    Performed by TRICIA SOLIZ at SURGERY [facility]    EXTERNAL FIXATOR APPLICATION  5/9/2012    Performed by TRICIA SOLIZ at SURGERY [facility] Medications:  Current Facility-Administered Medications   Medication Dose Frequency Provider Last Rate Last Admin    oxyCODONE immediate release (Roxicodone) tablet 10 mg  10 mg Q3HRS PRN Esteban Stovall M.D.   10 mg at 12/30/24 1123    HYDROmorphone (Dilaudid) tablet 4 mg  4 mg Q3HRS PRN Esteban Stovall M.D.        DULoxetine (Cymbalta) capsule 60 mg  60 mg DAILY Esteban Stovall M.D.   60 mg at 12/30/24 1123    acetaminophen (Tylenol) tablet 650 mg  650 mg Q6HRS PRN Esteban Stovall M.D.        senna-docusate (Pericolace Or Senokot S) 8.6-50 MG per tablet 2 Tablet  2 Tablet Q EVENING Esteban Stovall M.D.        And    polyethylene glycol/lytes (Miralax) Packet 1 Packet  1 Packet QDAY PRN Esteban Stovall M.D.        ondansetron (Zofran) syringe/vial injection 4 mg  4 mg Q4HRS PRN Esteban Stovall M.D.   4 mg at 12/30/24 1127    ondansetron (Zofran ODT) dispertab 4 mg  4 mg Q4HRS PRN Esteban Stovall M.D.        promethazine (Phenergan) tablet 12.5-25 mg  12.5-25 mg Q4HRS PRN Esteban Stovall M.D.        promethazine (Phenergan) suppository 12.5-25 mg  12.5-25 mg Q4HRS PRN Esteban Stovall M.D.        prochlorperazine (Compazine) injection 5-10 mg  5-10 mg Q4HRS PRN Esteban Stovall M.D.       Last reviewed on 12/30/2024  8:03 AM by Vanesa Tsai       Current Outpatient Medications:  Medications Prior to Admission   Medication Sig Dispense Refill Last Dose/Taking    multivitamin Tab Take 1 Tablet by mouth every day.   12/26/2024    HYDROmorphone (DILAUDID) 4 MG Tab Take 4 mg by mouth every 6 hours as needed for Severe Pain.   12/29/2024 at 10:00 PM    oxyCODONE immediate release (ROXICODONE) 10 MG  immediate release tablet Take 10 mg by mouth every 6 hours as needed for Moderate Pain. RAN OUT   12/28/2024    omeprazole (PRILOSEC) 20 MG delayed-release capsule Take 1 Capsule by mouth 2 times a day. 60 Capsule 2 12/17/2024    DULoxetine (CYMBALTA) 60 MG Cap DR Particles delayed-release capsule Take 1 Capsule by mouth every day. 30 Capsule 2 12/29/2024    polyethylene glycol/lytes (MIRALAX) Pack Take 1 Packet by mouth 2 times a day. To prevent constipation while taking narcotic pain medications. 30 Packet 3 12/29/2024    docusate sodium (COLACE) 100 MG Cap Take 1 Capsule by mouth 2 times a day. To prevent constipation while taking narcotic pain medications. 60 Capsule 2 12/29/2024    folic acid (FOLVITE) 1 MG Tab Take 1 mg by mouth every day.   Unknown    thiamine (THIAMINE) 100 MG tablet Take 100 mg by mouth 2 times a day.   Unknown    Naloxone (NARCAN) 4 MG/0.1ML Liquid Administer 4 mg into affected nostril(S) one time as needed (somulence due to narcotic overdose) for up to 1 dose. 1 Each 1          Medication Allergies:  Allergies   Allergen Reactions    Peanut-Derived Swelling     Tongue swelling    Other Food Unspecified     Allergy to walnuts and almonds    Tree Nuts Food Allergy Swelling     All nuts          Family Medical History:  History reviewed. No pertinent family history.      Social History:  Social History     Socioeconomic History    Marital status:      Spouse name: Not on file    Number of children: Not on file    Years of education: Not on file    Highest education level: Not on file   Occupational History    Not on file   Tobacco Use    Smoking status: Every Day     Current packs/day: 0.50     Average packs/day: 0.5 packs/day for 2.0 years (1.0 ttl pk-yrs)     Types: Cigarettes    Smokeless tobacco: Not on file   Substance and Sexual Activity    Alcohol use: Not Currently     Comment: every day 2-3 glasses of an alcholic beverage ,Vodka    Drug use: Not Currently     Types: Inhaled  "    Comment: 2007 to 2008 Meth. used Meth last Feb 10 for one day as stated by pt    Sexual activity: Not on file   Other Topics Concern    Not on file   Social History Narrative    Not on file     Social Drivers of Health     Financial Resource Strain: Not on file   Food Insecurity: No Food Insecurity (12/30/2024)    Hunger Vital Sign     Worried About Running Out of Food in the Last Year: Never true     Ran Out of Food in the Last Year: Never true   Transportation Needs: No Transportation Needs (12/30/2024)    PRAPARE - Transportation     Lack of Transportation (Medical): No     Lack of Transportation (Non-Medical): No   Physical Activity: Not on file   Stress: Not on file   Social Connections: Not on file   Intimate Partner Violence: Not At Risk (12/30/2024)    Humiliation, Afraid, Rape, and Kick questionnaire     Fear of Current or Ex-Partner: No     Emotionally Abused: No     Physically Abused: No     Sexually Abused: No   Housing Stability: Low Risk  (12/30/2024)    Housing Stability Vital Sign     Unable to Pay for Housing in the Last Year: No     Number of Times Moved in the Last Year: 1     Homeless in the Last Year: No         Vital signs:  Weight/BMI: Body mass index is 18.51 kg/m².  /61   Pulse 94   Temp 36.6 °C (97.9 °F) (Temporal)   Resp 16   Ht 1.6 m (5' 3\")   Wt 47.4 kg (104 lb 8 oz)   SpO2 96%   Vitals:    12/30/24 0625 12/30/24 0710 12/30/24 0818   BP: 106/70 100/61 103/61   Pulse: 97 97 94   Resp:   16   Temp:   36.6 °C (97.9 °F)   TempSrc:   Temporal   SpO2: 95% 93% 96%   Weight: 48.5 kg (107 lb)  47.4 kg (104 lb 8 oz)   Height: 1.6 m (5' 3\")       Oxygen Therapy:  Pulse Oximetry: 96 %, O2 Delivery Device: None - Room Air  No intake or output data in the 24 hours ending 12/30/24 1242      Physical Exam:  Physical Exam  Constitutional:       General: She is not in acute distress.     Appearance: She is normal weight. She is not toxic-appearing.   HENT:      Head: Normocephalic and " atraumatic.      Nose: Nose normal.      Mouth/Throat:      Mouth: Mucous membranes are moist.   Eyes:      Pupils: Pupils are equal, round, and reactive to light.   Cardiovascular:      Rate and Rhythm: Regular rhythm.      Pulses: Normal pulses.      Heart sounds: Normal heart sounds.   Pulmonary:      Effort: Pulmonary effort is normal.      Breath sounds: Normal breath sounds.   Abdominal:      General: Bowel sounds are normal. There is no distension.      Comments: Tender across upper abdomen   Musculoskeletal:         General: Normal range of motion.      Cervical back: Neck supple.   Skin:     General: Skin is warm and dry.   Neurological:      Mental Status: She is alert and oriented to person, place, and time.                 Labs:  Recent Labs     12/30/24 0220   SODIUM 136   POTASSIUM 3.6   CHLORIDE 100   CO2 24   BUN 14   CREATININE 0.44*   MAGNESIUM 1.8   CALCIUM 9.0     Recent Labs     12/30/24 0220   ALTSGPT 11   ASTSGOT 25   ALKPHOSPHAT 82   TBILIRUBIN 0.4   LIPASE 47   GLUCOSE 121*     Recent Labs     12/30/24 0220   WBC 8.4   NEUTSPOLYS 64.70   LYMPHOCYTES 25.70   MONOCYTES 6.90   EOSINOPHILS 1.80   BASOPHILS 0.50   ASTSGOT 25   ALTSGPT 11   ALKPHOSPHAT 82   TBILIRUBIN 0.4     Recent Labs     12/30/24 0220   RBC 3.51*   HEMOGLOBIN 10.2*   HEMATOCRIT 31.8*   PLATELETCT 383     Recent Results (from the past 24 hours)   CBC WITH DIFFERENTIAL    Collection Time: 12/30/24  2:20 AM   Result Value Ref Range    WBC 8.4 4.8 - 10.8 K/uL    RBC 3.51 (L) 4.20 - 5.40 M/uL    Hemoglobin 10.2 (L) 12.0 - 16.0 g/dL    Hematocrit 31.8 (L) 37.0 - 47.0 %    MCV 90.6 81.4 - 97.8 fL    MCH 29.1 27.0 - 33.0 pg    MCHC 32.1 (L) 32.2 - 35.5 g/dL    RDW 50.2 (H) 35.9 - 50.0 fL    Platelet Count 383 164 - 446 K/uL    MPV 10.7 9.0 - 12.9 fL    Neutrophils-Polys 64.70 44.00 - 72.00 %    Lymphocytes 25.70 22.00 - 41.00 %    Monocytes 6.90 0.00 - 13.40 %    Eosinophils 1.80 0.00 - 6.90 %    Basophils 0.50 0.00 - 1.80 %     Immature Granulocytes 0.40 0.00 - 0.90 %    Nucleated RBC 0.00 0.00 - 0.20 /100 WBC    Neutrophils (Absolute) 5.43 1.82 - 7.42 K/uL    Lymphs (Absolute) 2.15 1.00 - 4.80 K/uL    Monos (Absolute) 0.58 0.00 - 0.85 K/uL    Eos (Absolute) 0.15 0.00 - 0.51 K/uL    Baso (Absolute) 0.04 0.00 - 0.12 K/uL    Immature Granulocytes (abs) 0.03 0.00 - 0.11 K/uL    NRBC (Absolute) 0.00 K/uL   COMP METABOLIC PANEL    Collection Time: 12/30/24  2:20 AM   Result Value Ref Range    Sodium 136 135 - 145 mmol/L    Potassium 3.6 3.6 - 5.5 mmol/L    Chloride 100 96 - 112 mmol/L    Co2 24 20 - 33 mmol/L    Anion Gap 12.0 7.0 - 16.0    Glucose 121 (H) 65 - 99 mg/dL    Bun 14 8 - 22 mg/dL    Creatinine 0.44 (L) 0.50 - 1.40 mg/dL    Calcium 9.0 8.4 - 10.2 mg/dL    Correct Calcium 9.5 8.5 - 10.5 mg/dL    AST(SGOT) 25 12 - 45 U/L    ALT(SGPT) 11 2 - 50 U/L    Alkaline Phosphatase 82 30 - 99 U/L    Total Bilirubin 0.4 0.1 - 1.5 mg/dL    Albumin 3.4 3.2 - 4.9 g/dL    Total Protein 7.3 6.0 - 8.2 g/dL    Globulin 3.9 (H) 1.9 - 3.5 g/dL    A-G Ratio 0.9 g/dL   LIPASE    Collection Time: 12/30/24  2:20 AM   Result Value Ref Range    Lipase 47 11 - 82 U/L   MAGNESIUM    Collection Time: 12/30/24  2:20 AM   Result Value Ref Range    Magnesium 1.8 1.5 - 2.5 mg/dL   ESTIMATED GFR    Collection Time: 12/30/24  2:20 AM   Result Value Ref Range    GFR (CKD-EPI) 124 >60 mL/min/1.73 m 2         Radiology Review:  CT-PANCREAS AND ABDOMEN WITH & W/O    (Results Pending)         MDM (Data Review):   -Records reviewed and summarized in current documentation  -I personally reviewed and interpreted the laboratory results  -I personally reviewed the radiology images    Assessment/Recommendations:    --Intractable abdominal pain, suspect pancreatic  --Pancreatic duct stone, unable to be removed endoscopically  --Possible small, main duct, IPMN  --AUD, 1 month sobriety and needs 6 months before consideration for pancreatic surgery.  She understands  this.  --Anemia  --History of substance abuse    Recs:  --Discussed case with Dr. Ramos, Dr. Herrera and Dr. Esqueda  --Planning for ERCP with pancreatic stent placement and EUS for possible celiac plexus block on Wednesday with Dr. Herrera  --Pain control          Stacie Sen M.D.          Core Quality Measures   Reviewed items:  Labs, Medications and Radiology reports reviewed

## 2024-12-30 NOTE — ED NOTES
Bedside report received from off going RN/tech: Adriel, assumed care of patient.  POC discussed with patient. Call light within reach, all needs addressed at this time.       Fall risk interventions in place: Patient's personal possessions are with in their safe reach (all applicable per [facility] Fall risk assessment)   Continuous monitoring: Pulse Ox or Blood Pressure  IVF/IV medications: Not Applicable   Oxygen: Room Air  Bedside sitter: Not Applicable   Isolation: Not Applicable

## 2024-12-31 VITALS
RESPIRATION RATE: 15 BRPM | WEIGHT: 104.5 LBS | OXYGEN SATURATION: 95 % | TEMPERATURE: 96.9 F | SYSTOLIC BLOOD PRESSURE: 98 MMHG | HEIGHT: 63 IN | DIASTOLIC BLOOD PRESSURE: 69 MMHG | BODY MASS INDEX: 18.52 KG/M2 | HEART RATE: 98 BPM

## 2024-12-31 PROCEDURE — A9270 NON-COVERED ITEM OR SERVICE: HCPCS | Performed by: STUDENT IN AN ORGANIZED HEALTH CARE EDUCATION/TRAINING PROGRAM

## 2024-12-31 PROCEDURE — 700111 HCHG RX REV CODE 636 W/ 250 OVERRIDE (IP): Mod: JZ | Performed by: STUDENT IN AN ORGANIZED HEALTH CARE EDUCATION/TRAINING PROGRAM

## 2024-12-31 PROCEDURE — 99232 SBSQ HOSP IP/OBS MODERATE 35: CPT | Performed by: NURSE PRACTITIONER

## 2024-12-31 PROCEDURE — 700102 HCHG RX REV CODE 250 W/ 637 OVERRIDE(OP): Performed by: HOSPITALIST

## 2024-12-31 PROCEDURE — A9270 NON-COVERED ITEM OR SERVICE: HCPCS | Performed by: HOSPITALIST

## 2024-12-31 PROCEDURE — 770006 HCHG ROOM/CARE - MED/SURG/GYN SEMI*

## 2024-12-31 PROCEDURE — 700102 HCHG RX REV CODE 250 W/ 637 OVERRIDE(OP): Performed by: STUDENT IN AN ORGANIZED HEALTH CARE EDUCATION/TRAINING PROGRAM

## 2024-12-31 PROCEDURE — 99233 SBSQ HOSP IP/OBS HIGH 50: CPT | Performed by: STUDENT IN AN ORGANIZED HEALTH CARE EDUCATION/TRAINING PROGRAM

## 2024-12-31 RX ORDER — FOLIC ACID 1 MG/1
1 TABLET ORAL DAILY
Status: DISCONTINUED | OUTPATIENT
Start: 2024-12-31 | End: 2025-01-05 | Stop reason: HOSPADM

## 2024-12-31 RX ORDER — HYDROMORPHONE HYDROCHLORIDE 4 MG/1
4 TABLET ORAL EVERY 4 HOURS PRN
Status: DISCONTINUED | OUTPATIENT
Start: 2024-12-31 | End: 2025-01-04

## 2024-12-31 RX ORDER — OXYCODONE HYDROCHLORIDE 10 MG/1
10 TABLET ORAL EVERY 4 HOURS PRN
Status: DISCONTINUED | OUTPATIENT
Start: 2024-12-31 | End: 2025-01-03

## 2024-12-31 RX ORDER — GAUZE BANDAGE 2" X 2"
100 BANDAGE TOPICAL DAILY
Status: DISCONTINUED | OUTPATIENT
Start: 2024-12-31 | End: 2025-01-05 | Stop reason: HOSPADM

## 2024-12-31 RX ORDER — ENOXAPARIN SODIUM 100 MG/ML
40 INJECTION SUBCUTANEOUS DAILY
Status: DISCONTINUED | OUTPATIENT
Start: 2024-12-31 | End: 2025-01-01

## 2024-12-31 RX ADMIN — OXYCODONE HYDROCHLORIDE 10 MG: 10 TABLET ORAL at 14:26

## 2024-12-31 RX ADMIN — SENNOSIDES AND DOCUSATE SODIUM 2 TABLET: 50; 8.6 TABLET ORAL at 18:19

## 2024-12-31 RX ADMIN — HYDROMORPHONE HYDROCHLORIDE 4 MG: 4 TABLET ORAL at 00:10

## 2024-12-31 RX ADMIN — Medication 100 MG: at 12:57

## 2024-12-31 RX ADMIN — OXYCODONE HYDROCHLORIDE 10 MG: 10 TABLET ORAL at 23:42

## 2024-12-31 RX ADMIN — ENOXAPARIN SODIUM 40 MG: 100 INJECTION SUBCUTANEOUS at 18:19

## 2024-12-31 RX ADMIN — HYDROMORPHONE HYDROCHLORIDE 4 MG: 4 TABLET ORAL at 22:37

## 2024-12-31 RX ADMIN — HYDROMORPHONE HYDROCHLORIDE 4 MG: 4 TABLET ORAL at 12:57

## 2024-12-31 RX ADMIN — HYDROMORPHONE HYDROCHLORIDE 4 MG: 4 TABLET ORAL at 09:27

## 2024-12-31 RX ADMIN — OMEPRAZOLE 20 MG: 20 CAPSULE, DELAYED RELEASE ORAL at 12:55

## 2024-12-31 RX ADMIN — FOLIC ACID 1 MG: 1 TABLET ORAL at 12:57

## 2024-12-31 RX ADMIN — DULOXETINE HYDROCHLORIDE 60 MG: 30 CAPSULE, DELAYED RELEASE ORAL at 05:33

## 2024-12-31 RX ADMIN — HYDROMORPHONE HYDROCHLORIDE 4 MG: 4 TABLET ORAL at 18:18

## 2024-12-31 RX ADMIN — HYDROMORPHONE HYDROCHLORIDE 4 MG: 4 TABLET ORAL at 05:32

## 2024-12-31 RX ADMIN — OXYCODONE HYDROCHLORIDE 10 MG: 10 TABLET ORAL at 19:35

## 2024-12-31 ASSESSMENT — COGNITIVE AND FUNCTIONAL STATUS - GENERAL
DRESSING REGULAR LOWER BODY CLOTHING: A LITTLE
DAILY ACTIVITIY SCORE: 23
SUGGESTED CMS G CODE MODIFIER MOBILITY: CJ
WALKING IN HOSPITAL ROOM: A LITTLE
CLIMB 3 TO 5 STEPS WITH RAILING: A LITTLE
SUGGESTED CMS G CODE MODIFIER DAILY ACTIVITY: CI
MOBILITY SCORE: 22

## 2024-12-31 ASSESSMENT — ENCOUNTER SYMPTOMS
FEVER: 0
SHORTNESS OF BREATH: 0
NAUSEA: 1
WEIGHT LOSS: 1
ABDOMINAL PAIN: 1
VOMITING: 0

## 2024-12-31 ASSESSMENT — PAIN DESCRIPTION - PAIN TYPE
TYPE: ACUTE PAIN

## 2024-12-31 NOTE — PROGRESS NOTES
4 Eyes Skin Assessment Completed by Miles, RN and CARLINE Morrison.    Head WDL  Ears WDL  Nose WDL  Mouth WDL  Neck WDL  Breast/Chest WDL  Shoulder Blades WDL  Spine WDL  (R) Arm/Elbow/Hand Scab  (L) Arm/Elbow/Hand Scab  Abdomen WDL  Groin WDL  Scrotum/Coccyx/Buttocks WDL  (R) Leg Scab  (L) Leg Scab  (R) Heel/Foot/Toe WDL  (L) Heel/Foot/Toe WDL      Devices In Places Blood Pressure Cuff and Pulse Ox      Interventions In Place Pillows    Possible Skin Injury No    Pictures Uploaded Into Epic N/A  Wound Consult Placed N/A  RN Wound Prevention Protocol Ordered No

## 2024-12-31 NOTE — PROGRESS NOTES
Received report and assumed care of patient at change of shift. Patient is A&Ox4, on RA, and reports 6/10 abdominal pain at this time. Pt declined medication intervention at this time. Patient assessment not completed at this time, bed in lowest position, and call light and personal belongings are within reach. Patient expressed no further needs at this time.

## 2024-12-31 NOTE — PROGRESS NOTES
Hospital Medicine Daily Progress Note    Date of Service  12/31/2024    Chief Complaint  Brenda Cartagena is a 39 y.o. female admitted 12/30/2024 with abdominal pain     Hospital Course  Brenda Cartagena is a 39 y.o. female with a  past medical history of alcohol dependence and methamphetamine abuse last use was over 1 month ago who has had multiple admissions due to abdominal pain and pancreatitis.  In October she underwent EGD with gastritis and MRCP revealing pancreatic ductal dilation with stone and debris and a cystic lesion consistent with intraductal papillary mucinous neoplasm She subsequently underwent endoscopic ultrasound by Dr. Dawson gastroenterology revealing a stone lodged in the genu of the pancreas that was not able to be removed.  She stopped drinking and using methamphetamine and was evaluated by Dr. Byers hepatobiliary surgery.  She felt that her condition could be consistent with intraductal papillary mucinous neoplasm and recommended possible Puestow procedure to relieve the chronic pancreatitis but noted that she must have 6 months of complete sobriety from alcohol first.  She was discharged on 12/23/2024 with 5 days worth of oxycodone.  She ran out of the medications and in [place] there are no primary care providers taking new patients therefore presented to [facility] with a day and a half of severe abdominal pain and vomiting.  Lipase is normal labs are unremarkable. Hepatobiliary surgery was again consulted as was GI.  In the meantime she will be given IV fluids, pain control and her diet will be advanced if tolerated.    Interval Problem Update  Pt seen at bedside, she still notes pain but states its stable with medications, she is a bit sedated on exam but responding appropriately. Denies much of an appetite.   - vitals and labs reviewed and stable   - CT pancrease ordered and completed showing stable Low-density lesion In tail and pancreatic dilation. soft tissue  nodularity along the posterior aspect of the stomach now demonstrate central low density and peripheral enhancement. Hepatosplenomegaly.  - hepatobiliary surgery following   - GI following, case discussed, planning for ERCP with possible stent, EUS, celiac block on Friday 1/3/2024  - I have adjusted analgesia given lethargy this AM, bowel protocol     I have discussed this patient's plan of care and discharge plan at IDT rounds today with Case Management, Nursing, Nursing leadership, and other members of the IDT team.    Consultants/Specialty  GI and Hepatobiliary surgery    Code Status  Full Code    Disposition  The patient is not medically cleared for discharge to home or a post-acute facility.      I have placed the appropriate orders for post-discharge needs.    Review of Systems  Review of Systems   Constitutional:  Positive for weight loss. Negative for fever.   Respiratory:  Negative for shortness of breath.    Gastrointestinal:  Positive for abdominal pain and nausea. Negative for vomiting.        Physical Exam  Temp:  [36.3 °C (97.4 °F)-37 °C (98.6 °F)] 36.3 °C (97.4 °F)  Pulse:  [] 92  Resp:  [14-16] 16  BP: ()/(66-79) 99/66  SpO2:  [91 %-95 %] 94 %    Physical Exam  Vitals and nursing note reviewed.   Constitutional:       Appearance: She is ill-appearing.      Comments: Drowsy female    HENT:      Head: Normocephalic and atraumatic.      Mouth/Throat:      Mouth: Mucous membranes are moist.   Eyes:      Conjunctiva/sclera: Conjunctivae normal.   Neurological:      Mental Status: She is oriented to person, place, and time.   Psychiatric:         Mood and Affect: Mood normal.         Behavior: Behavior normal.         Fluids    Intake/Output Summary (Last 24 hours) at 12/31/2024 1045  Last data filed at 12/30/2024 2015  Gross per 24 hour   Intake 240 ml   Output --   Net 240 ml        Laboratory  Recent Labs     12/30/24  0220   WBC 8.4   RBC 3.51*   HEMOGLOBIN 10.2*   HEMATOCRIT 31.8*   MCV  90.6   MCH 29.1   MCHC 32.1*   RDW 50.2*   PLATELETCT 383   MPV 10.7     Recent Labs     12/30/24  0220   SODIUM 136   POTASSIUM 3.6   CHLORIDE 100   CO2 24   GLUCOSE 121*   BUN 14   CREATININE 0.44*   CALCIUM 9.0                   Imaging  CT-PANCREAS AND ABDOMEN WITH & W/O   Final Result         1. Low-density lesion in the tail of the pancreas is stable along with pancreatic ductal dilatation.   2. The areas of soft tissue nodularity along the posterior aspect of the stomach now demonstrate central low density and peripheral enhancement. This may represent pseudocyst or abscess. Necrotic tumor and hematoma would be additional possible    differential considerations.   3. Hepatosplenomegaly.                 Assessment/Plan  * Abdominal pain- (present on admission)  Assessment & Plan  Complex situation for which she has undergone EGD multiple imaging studies and most consistent with intraductal papillary mucinous neoplasm with chronic pancreatic pain.  Should be placed under observation status for fluids and pain control.  Diet will be advanced if tolerated.  If no intervention is performed then she may be discharged on oral narcotic medications but likely will need a 30-day supply and will need to have a way of getting refills either through the hepatobiliary office or one of the clinics here in [facility].  I have adjusted analgesia back today given her drowsiness, continue to assess and adjust as needed    IPMN (intraductal papillary mucinous neoplasm)- (present on admission)  Assessment & Plan  She has been seen by Dr. Byers for consideration of Puestow procedure but needs 6 months of sobriety.  She has not drank in over 1 month.  hepatobiliary surgery has been reconsulted   Pancreatic CT ordered and reviewed  GI consulted, I discussed case with team, planning for ERCP w/ potential stent/EUS/ciliac block, procedure planned for Friday 1/3/2025     Pancreatic duct stones- (present on admission)  Assessment &  Plan  Chronic condition  GI following, procedure planned for 1/3    Normocytic anemia- (present on admission)  Assessment & Plan  Chronic, stable   No signs of bleeding, will trend labs          VTE prophylaxis:    enoxaparin ppx      I have performed a physical exam and reviewed and updated ROS and Plan today (12/31/2024). In review of yesterday's note (12/30/2024), there are no changes except as documented above.

## 2024-12-31 NOTE — PROGRESS NOTES
Assumed care of patient at 1845. Bedside report received from day RN. Assessment complete.  AA&Ox4 Denies CP/SOB.  Reporting 6/10 pain. Medicated per MAR.   Educated patient regarding pharmacologic and non pharmacologic modalities for pain management.  Skin per flowsheets  Tolerating Regular diet. Denies N/V at this time.  + void. Last BM PTA 12/28  Pt ambulates SBA.  All needs met at this time. Call light within reach. Pt calls appropriately. Bed low and locked, non skid socks in place. Hourly rounding in place.

## 2024-12-31 NOTE — PROGRESS NOTES
Report Given to Salvador CROWLEY on S5 at this time, patient medicated for pain and nausea prior to leaving floor, patient is medical

## 2024-12-31 NOTE — PROGRESS NOTES
4 Eyes Skin Assessment Completed by CARLINE Bolanos and CARLINE Sprague.    Head WDL  Ears WDL  Nose WDL  Mouth WDL  Neck WDL  Breast/Chest WDL  Shoulder Blades WDL  Spine WDL  (R) Arm/Elbow/Hand WDL  (L) Arm/Elbow/Hand WDL  Abdomen WDL  Groin WDL  Scrotum/Coccyx/Buttocks WDL  (R) Leg WDL  (L) Leg WDL  (R) Heel/Foot/Toe WDL  (L) Heel/Foot/Toe WDL          Devices In Places None      Interventions In Place Pillows    Possible Skin Injury No    Pictures Uploaded Into Epic N/A  Wound Consult Placed N/A  RN Wound Prevention Protocol Ordered No

## 2024-12-31 NOTE — PROGRESS NOTES
Report received from NOC RN and assumed patient care at 0700. Patient is A&Ox 4, on room air and has a frame alarm on. Patient reporting a pain level of 9/10 (medication administered).     Call light within reach and bed in lowest position. Reinforced the need to call for assistance. Plan of care discussed and patient does not have any further needs at this time.

## 2024-12-31 NOTE — PROGRESS NOTES
..Gastroenterology Progress Note               Author:  Lala Quick, MARYANA,  APRN Date & Time Created: 12/31/2024 8:00 AM       Patient ID:  Name:             Brenda Cartagena  YOB: 1985  Age:                 39 y.o.  female  MRN:               [id number]    Medical Decision Making, by Problem:  Active Hospital Problems    Diagnosis     Pancreatitis without necrosis or infection [K85.90]     Pancreatic duct stones [K86.89]     IPMN (intraductal papillary mucinous neoplasm) [D49.0]     Abdominal pain [R10.9]      Presenting Chief Complaint:  Intractable abdominal pain      History of Present Illness:    This is a 39 y.o. female transferred from [facility] for a pancreatic duct stone with abdominal pain, nausea and vomiting.       She was recently admitted to [facility] in November for pancreatic mass versus pseudocyst and underwent MRI which did confirm pancreatic ductal dilation with stones and debris, cystic lesion with internal debris and/or stones in the distal pancreatic body 15mm, IPMN favored.  Seen by [facility] and on 11/12/24 and underwent EGD and was found to have mild erosive esophagitis and gastric erosions negative for H pylori.  She underwent EUS about 4 weeks ago at [place] with Dr. Dawson:   pancreatic duct stone 4mm in a dilated portion of the PD in the genu of the pancreas that may be an early main duct IPMN but the downstream portion is too small to remove stone and therefore cannot be done endoscopically and Puestow recommended.   She was referred to Surgical Oncology for stone removal.  Surgery could not be scheduled for a few months and [facility] contacted Dr. Byers, Hepatobiliary surgery.  Patient seen by us and we consulted Hepatobiliary Surgery.  Their recommendation was 6 months sobriety with recommendation for Pain Management and follow up with Dr. Dawson for pancreatic stent placement. Discharged home 12/23/24.      Presented 12/30/2024  to [facility] with intractable pain but they were not willing to have her evaluated by Dr. Dawson or his partners of [facility] for pancreatic stent and so she was sent to [facility].  Dr. Stovall discussed case with Dr. Esqueda who discussed case with Dr. Herrera for attempt at pancreatic stent.  Also discussed possibility of celiac plexus block.     Patient has been alcohol free x 1 month but difficulty with pain control.  Has lost 8 pounds this past month.      Interval History:  12/31/2024: Patient seen, very lethargic post pain medication administration. Able to tell me she is still having abdominal pain. Last BM 12/28, minimal appetite.     Hospital Medications:  Current Facility-Administered Medications   Medication Dose Frequency Provider Last Rate Last Admin    oxyCODONE immediate release (Roxicodone) tablet 10 mg  10 mg Q3HRS PRN Esteban Stovall M.D.   10 mg at 12/30/24 2015    HYDROmorphone (Dilaudid) tablet 4 mg  4 mg Q3HRS PRN Esteban Stovall M.D.   4 mg at 12/31/24 0532    DULoxetine (Cymbalta) capsule 60 mg  60 mg DAILY Esteban Stovall M.D.   60 mg at 12/31/24 0533    acetaminophen (Tylenol) tablet 650 mg  650 mg Q6HRS PRN Esteban Stovall M.D.        senna-docusate (Pericolace Or Senokot S) 8.6-50 MG per tablet 2 Tablet  2 Tablet Q EVENING Esteban Stovall M.D.   2 Tablet at 12/30/24 1749    And    polyethylene glycol/lytes (Miralax) Packet 1 Packet  1 Packet QDAY PRN Esteban Stovall M.D.        ondansetron (Zofran) syringe/vial injection 4 mg  4 mg Q4HRS PRN Esteban Stovall M.D.   4 mg at 12/30/24 1807    ondansetron (Zofran ODT) dispertab 4 mg  4 mg Q4HRS PRN Esteban Stovall M.D.        promethazine (Phenergan) tablet 12.5-25 mg  12.5-25 mg Q4HRS PRN Esteban Stovall M.D.        promethazine (Phenergan) suppository 12.5-25 mg  12.5-25 mg Q4HRS PRN Esteban Stovall M.D.        prochlorperazine (Compazine) injection 5-10 mg  5-10 mg Q4HRS PRN Esteban Stovall M.D.       Last reviewed on  "12/30/2024  8:03 AM by Vanesa Tsai       Review of Systems:  Review of Systems   Unable to perform ROS: Acuity of condition         Vital signs:  Weight/BMI: Body mass index is 18.51 kg/m².  BP 99/66   Pulse 92   Temp 36.3 °C (97.4 °F) (Temporal)   Resp 16   Ht 1.6 m (5' 3\")   Wt 47.4 kg (104 lb 8 oz)   SpO2 94%   Vitals:    12/30/24 1526 12/30/24 1907 12/31/24 0422 12/31/24 0726   BP: 106/68 115/79 118/68 99/66   Pulse: 100 (!) 105 (!) 101 92   Resp: 16 14 15 16   Temp: 37 °C (98.6 °F) 36.7 °C (98.1 °F) 36.8 °C (98.3 °F) 36.3 °C (97.4 °F)   TempSrc: Temporal Temporal Temporal Temporal   SpO2: 95% 94% 91% 94%   Weight:       Height:         Oxygen Therapy:  Pulse Oximetry: 94 %, O2 (LPM): 0, O2 Delivery Device: None - Room Air    Intake/Output Summary (Last 24 hours) at 12/31/2024 0800  Last data filed at 12/30/2024 2015  Gross per 24 hour   Intake 240 ml   Output --   Net 240 ml         Physical Exam:  Physical Exam  Constitutional:       Appearance: She is ill-appearing.   Abdominal:      Tenderness: There is abdominal tenderness.   Skin:     Coloration: Skin is pale.   Neurological:      Motor: Weakness present.             Labs:  Recent Labs     12/30/24 0220   SODIUM 136   POTASSIUM 3.6   CHLORIDE 100   CO2 24   BUN 14   CREATININE 0.44*   MAGNESIUM 1.8   CALCIUM 9.0     Recent Labs     12/30/24  0220   ALTSGPT 11   ASTSGOT 25   ALKPHOSPHAT 82   TBILIRUBIN 0.4   LIPASE 47   GLUCOSE 121*     Recent Labs     12/30/24 0220   WBC 8.4   NEUTSPOLYS 64.70   LYMPHOCYTES 25.70   MONOCYTES 6.90   EOSINOPHILS 1.80   BASOPHILS 0.50   ASTSGOT 25   ALTSGPT 11   ALKPHOSPHAT 82   TBILIRUBIN 0.4     Recent Labs     12/30/24  0220   RBC 3.51*   HEMOGLOBIN 10.2*   HEMATOCRIT 31.8*   PLATELETCT 383     No results found for this or any previous visit (from the past 24 hours).    Radiology Review:  CT-PANCREAS AND ABDOMEN WITH & W/O   Final Result         1. Low-density lesion in the tail of the pancreas is stable " along with pancreatic ductal dilatation.   2. The areas of soft tissue nodularity along the posterior aspect of the stomach now demonstrate central low density and peripheral enhancement. This may represent pseudocyst or abscess. Necrotic tumor and hematoma would be additional possible    differential considerations.   3. Hepatosplenomegaly.                  MDM (Data Review):   -Records reviewed and summarized in current documentation  -I personally reviewed and interpreted the laboratory results  -I personally reviewed the radiology images    Assessment/Recommendations:  Intractable abdominal pain, suspect pancreatic  --Pancreatic duct stone, unable to be removed endoscopically  --Possible small, main duct, IPMN  --AUD, 1 month sobriety and needs 6 months before consideration for pancreatic surgery.  She understands this.  --Anemia  --History of substance abuse     Recommendations:  --Previously discussed case with Dr. Ramos, Dr. Herrera and Dr. Esqueda  --Planning for ERCP with pancreatic stent placement and EUS for possible celiac plexus block on Friday with Dr. Herrera  --Pain control  --Diet as tolerated     Plan discussed with Dr. Venegas and Dr. Herrera    ..Lala Quick, DNP,  APRN    Core Quality Measures   Reviewed items::  Labs, Medications and Radiology reports reviewed      Addendum: pt has large peripancreatic fluid collection. Discussed with [facility]. Anticipate EUS guided cystogastrostomy with lumen apposing stent on friday

## 2024-12-31 NOTE — HOSPITAL COURSE
Brenda Cartagena is a 39 y.o. female with a  past medical history of alcohol dependence and methamphetamine abuse last use was over 1 month ago who has had multiple admissions due to abdominal pain and pancreatitis.  In October she underwent EGD with gastritis and MRCP revealing pancreatic ductal dilation with stone and debris and a cystic lesion consistent with intraductal papillary mucinous neoplasm She subsequently underwent endoscopic ultrasound by Dr. Dawson gastroenterology revealing a stone lodged in the genu of the pancreas that was not able to be removed.  She stopped drinking and using methamphetamine and was evaluated by Dr. Byers hepatobiliary surgery.  She felt that her condition could be consistent with intraductal papillary mucinous neoplasm and recommended possible Puestow procedure to relieve the chronic pancreatitis but noted that she must have 6 months of complete sobriety from alcohol first.  She was discharged on 12/23/2024 with 5 days worth of oxycodone.  She ran out of the medications and in [place] there are no primary care providers taking new patients therefore presented to [facility] with a day and a half of severe abdominal pain and vomiting.  Lipase is normal labs are unremarkable. Hepatobiliary surgery was again consulted as was GI.  In the meantime she will be given IV fluids, pain control and her diet will be advanced if tolerated.

## 2024-12-31 NOTE — PROGRESS NOTES
Patient left the floor at this time for S5 with transport via wheelchair with all personal belongings

## 2025-01-01 LAB
ALBUMIN SERPL BCP-MCNC: 3.4 G/DL (ref 3.2–4.9)
ALBUMIN/GLOB SERPL: 0.8 G/DL
ALP SERPL-CCNC: 89 U/L (ref 30–99)
ALT SERPL-CCNC: 9 U/L (ref 2–50)
ANION GAP SERPL CALC-SCNC: 12 MMOL/L (ref 7–16)
AST SERPL-CCNC: 33 U/L (ref 12–45)
BASOPHILS # BLD AUTO: 0.4 % (ref 0–1.8)
BASOPHILS # BLD: 0.04 K/UL (ref 0–0.12)
BILIRUB SERPL-MCNC: 0.4 MG/DL (ref 0.1–1.5)
BUN SERPL-MCNC: 13 MG/DL (ref 8–22)
CALCIUM ALBUM COR SERPL-MCNC: 9.7 MG/DL (ref 8.5–10.5)
CALCIUM SERPL-MCNC: 9.2 MG/DL (ref 8.5–10.5)
CHLORIDE SERPL-SCNC: 99 MMOL/L (ref 96–112)
CO2 SERPL-SCNC: 24 MMOL/L (ref 20–33)
CREAT SERPL-MCNC: 0.39 MG/DL (ref 0.5–1.4)
EOSINOPHIL # BLD AUTO: 0.22 K/UL (ref 0–0.51)
EOSINOPHIL NFR BLD: 2.4 % (ref 0–6.9)
ERYTHROCYTE [DISTWIDTH] IN BLOOD BY AUTOMATED COUNT: 49.1 FL (ref 35.9–50)
GFR SERPLBLD CREATININE-BSD FMLA CKD-EPI: 128 ML/MIN/1.73 M 2
GLOBULIN SER CALC-MCNC: 4.2 G/DL (ref 1.9–3.5)
GLUCOSE SERPL-MCNC: 105 MG/DL (ref 65–99)
HCT VFR BLD AUTO: 32.4 % (ref 37–47)
HGB BLD-MCNC: 10.1 G/DL (ref 12–16)
IMM GRANULOCYTES # BLD AUTO: 0.01 K/UL (ref 0–0.11)
IMM GRANULOCYTES NFR BLD AUTO: 0.1 % (ref 0–0.9)
LYMPHOCYTES # BLD AUTO: 2.03 K/UL (ref 1–4.8)
LYMPHOCYTES NFR BLD: 21.8 % (ref 22–41)
MCH RBC QN AUTO: 28.5 PG (ref 27–33)
MCHC RBC AUTO-ENTMCNC: 31.2 G/DL (ref 32.2–35.5)
MCV RBC AUTO: 91.3 FL (ref 81.4–97.8)
MONOCYTES # BLD AUTO: 0.68 K/UL (ref 0–0.85)
MONOCYTES NFR BLD AUTO: 7.3 % (ref 0–13.4)
NEUTROPHILS # BLD AUTO: 6.32 K/UL (ref 1.82–7.42)
NEUTROPHILS NFR BLD: 68 % (ref 44–72)
NRBC # BLD AUTO: 0 K/UL
NRBC BLD-RTO: 0 /100 WBC (ref 0–0.2)
PLATELET # BLD AUTO: 381 K/UL (ref 164–446)
PMV BLD AUTO: 10.9 FL (ref 9–12.9)
POTASSIUM SERPL-SCNC: 3.9 MMOL/L (ref 3.6–5.5)
PROT SERPL-MCNC: 7.6 G/DL (ref 6–8.2)
RBC # BLD AUTO: 3.55 M/UL (ref 4.2–5.4)
SODIUM SERPL-SCNC: 135 MMOL/L (ref 135–145)
WBC # BLD AUTO: 9.3 K/UL (ref 4.8–10.8)

## 2025-01-01 PROCEDURE — 700111 HCHG RX REV CODE 636 W/ 250 OVERRIDE (IP): Mod: JZ | Performed by: HOSPITALIST

## 2025-01-01 PROCEDURE — 770006 HCHG ROOM/CARE - MED/SURG/GYN SEMI*

## 2025-01-01 PROCEDURE — 700111 HCHG RX REV CODE 636 W/ 250 OVERRIDE (IP): Mod: JZ | Performed by: STUDENT IN AN ORGANIZED HEALTH CARE EDUCATION/TRAINING PROGRAM

## 2025-01-01 PROCEDURE — 700102 HCHG RX REV CODE 250 W/ 637 OVERRIDE(OP): Performed by: STUDENT IN AN ORGANIZED HEALTH CARE EDUCATION/TRAINING PROGRAM

## 2025-01-01 PROCEDURE — A9270 NON-COVERED ITEM OR SERVICE: HCPCS | Performed by: HOSPITALIST

## 2025-01-01 PROCEDURE — 36415 COLL VENOUS BLD VENIPUNCTURE: CPT

## 2025-01-01 PROCEDURE — 80053 COMPREHEN METABOLIC PANEL: CPT

## 2025-01-01 PROCEDURE — 700111 HCHG RX REV CODE 636 W/ 250 OVERRIDE (IP)

## 2025-01-01 PROCEDURE — 99233 SBSQ HOSP IP/OBS HIGH 50: CPT | Performed by: STUDENT IN AN ORGANIZED HEALTH CARE EDUCATION/TRAINING PROGRAM

## 2025-01-01 PROCEDURE — A9270 NON-COVERED ITEM OR SERVICE: HCPCS | Performed by: STUDENT IN AN ORGANIZED HEALTH CARE EDUCATION/TRAINING PROGRAM

## 2025-01-01 PROCEDURE — 85025 COMPLETE CBC W/AUTO DIFF WBC: CPT

## 2025-01-01 PROCEDURE — 700102 HCHG RX REV CODE 250 W/ 637 OVERRIDE(OP): Performed by: HOSPITALIST

## 2025-01-01 RX ORDER — HYDROMORPHONE HYDROCHLORIDE 1 MG/ML
0.5 INJECTION, SOLUTION INTRAMUSCULAR; INTRAVENOUS; SUBCUTANEOUS ONCE
Status: COMPLETED | OUTPATIENT
Start: 2025-01-01 | End: 2025-01-01

## 2025-01-01 RX ORDER — ENOXAPARIN SODIUM 100 MG/ML
40 INJECTION SUBCUTANEOUS DAILY
Status: COMPLETED | OUTPATIENT
Start: 2025-01-01 | End: 2025-01-01

## 2025-01-01 RX ORDER — ACETAMINOPHEN 500 MG
1000 TABLET ORAL 3 TIMES DAILY
Status: DISCONTINUED | OUTPATIENT
Start: 2025-01-01 | End: 2025-01-05 | Stop reason: HOSPADM

## 2025-01-01 RX ADMIN — ACETAMINOPHEN 1000 MG: 500 TABLET, FILM COATED ORAL at 15:48

## 2025-01-01 RX ADMIN — SENNOSIDES AND DOCUSATE SODIUM 2 TABLET: 50; 8.6 TABLET ORAL at 17:27

## 2025-01-01 RX ADMIN — HYDROMORPHONE HYDROCHLORIDE 4 MG: 4 TABLET ORAL at 06:04

## 2025-01-01 RX ADMIN — HYDROMORPHONE HYDROCHLORIDE 4 MG: 4 TABLET ORAL at 12:50

## 2025-01-01 RX ADMIN — ENOXAPARIN SODIUM 40 MG: 100 INJECTION SUBCUTANEOUS at 17:27

## 2025-01-01 RX ADMIN — FOLIC ACID 1 MG: 1 TABLET ORAL at 06:03

## 2025-01-01 RX ADMIN — DULOXETINE HYDROCHLORIDE 60 MG: 30 CAPSULE, DELAYED RELEASE ORAL at 06:04

## 2025-01-01 RX ADMIN — OXYCODONE HYDROCHLORIDE 10 MG: 10 TABLET ORAL at 19:33

## 2025-01-01 RX ADMIN — OXYCODONE HYDROCHLORIDE 10 MG: 10 TABLET ORAL at 23:27

## 2025-01-01 RX ADMIN — ONDANSETRON 4 MG: 2 INJECTION INTRAMUSCULAR; INTRAVENOUS at 07:33

## 2025-01-01 RX ADMIN — HYDROMORPHONE HYDROCHLORIDE 4 MG: 4 TABLET ORAL at 17:27

## 2025-01-01 RX ADMIN — OXYCODONE HYDROCHLORIDE 10 MG: 10 TABLET ORAL at 03:18

## 2025-01-01 RX ADMIN — OMEPRAZOLE 20 MG: 20 CAPSULE, DELAYED RELEASE ORAL at 06:04

## 2025-01-01 RX ADMIN — OXYCODONE HYDROCHLORIDE 10 MG: 10 TABLET ORAL at 07:33

## 2025-01-01 RX ADMIN — ONDANSETRON 4 MG: 2 INJECTION INTRAMUSCULAR; INTRAVENOUS at 12:50

## 2025-01-01 RX ADMIN — ACETAMINOPHEN 1000 MG: 500 TABLET, FILM COATED ORAL at 21:28

## 2025-01-01 RX ADMIN — HYDROMORPHONE HYDROCHLORIDE 0.5 MG: 1 INJECTION, SOLUTION INTRAMUSCULAR; INTRAVENOUS; SUBCUTANEOUS at 04:27

## 2025-01-01 RX ADMIN — Medication 100 MG: at 06:03

## 2025-01-01 RX ADMIN — HYDROMORPHONE HYDROCHLORIDE 4 MG: 4 TABLET ORAL at 02:02

## 2025-01-01 RX ADMIN — OXYCODONE HYDROCHLORIDE 10 MG: 10 TABLET ORAL at 13:51

## 2025-01-01 RX ADMIN — HYDROMORPHONE HYDROCHLORIDE 4 MG: 4 TABLET ORAL at 21:28

## 2025-01-01 ASSESSMENT — PAIN DESCRIPTION - PAIN TYPE
TYPE: ACUTE PAIN

## 2025-01-01 ASSESSMENT — ENCOUNTER SYMPTOMS
VOMITING: 0
FEVER: 0
SHORTNESS OF BREATH: 0
ABDOMINAL PAIN: 1
WEIGHT LOSS: 1
NAUSEA: 1

## 2025-01-01 NOTE — PROGRESS NOTES
Received report and assumed care of patient at change of shift. Patient is A&Ox4, on RA, and reports abdominal pain of 8/10 at this time, medicated per MAR. Patient assessment completed, bed in lowest position, and call light and personal belongings are within reach. Patient expressed no further needs at this time.

## 2025-01-01 NOTE — PROGRESS NOTES
..Gastroenterology Progress Note               Author:  Lala Quick, MARYANA,  APRN Date & Time Created: 1/1/2025 8:10 AM       Patient ID:  Name:             Brenda Cartagena  YOB: 1985  Age:                 39 y.o.  female  MRN:               [id number]    Medical Decision Making, by Problem:  Active Hospital Problems    Diagnosis     Pancreatitis without necrosis or infection [K85.90]     Pancreatic duct stones [K86.89]     IPMN (intraductal papillary mucinous neoplasm) [D49.0]     Abdominal pain [R10.9]     Normocytic anemia [D64.9]      Presenting Chief Complaint:  Intractable abdominal pain      History of Present Illness:    This is a 39 y.o. female transferred from [facility] for a pancreatic duct stone with abdominal pain, nausea and vomiting.       She was recently admitted to [facility] in November for pancreatic mass versus pseudocyst and underwent MRI which did confirm pancreatic ductal dilation with stones and debris, cystic lesion with internal debris and/or stones in the distal pancreatic body 15mm, IPMN favored.  Seen by [facility] and on 11/12/24 and underwent EGD and was found to have mild erosive esophagitis and gastric erosions negative for H pylori.  She underwent EUS about 4 weeks ago at [place] with Dr. Dawson:   pancreatic duct stone 4mm in a dilated portion of the PD in the genu of the pancreas that may be an early main duct IPMN but the downstream portion is too small to remove stone and therefore cannot be done endoscopically and Puestow recommended.   She was referred to Surgical Oncology for stone removal.  Surgery could not be scheduled for a few months and [facility] contacted Dr. Byers, Hepatobiliary surgery.  Patient seen by us and we consulted Hepatobiliary Surgery.  Their recommendation was 6 months sobriety with recommendation for Pain Management and follow up with Dr. Dawson for pancreatic stent placement. Discharged  home 12/23/24.     Presented 12/30/2024  to [facility] with intractable pain but they were not willing to have her evaluated by Dr. Dawson or his partners of [facility] for pancreatic stent and so she was sent to [facility].  Dr. Stovall discussed case with Dr. Esqueda who discussed case with Dr. Herrera for attempt at pancreatic stent.  Also discussed possibility of celiac plexus block.     Patient has been alcohol free x 1 month but difficulty with pain control.  Has lost 8 pounds this past month.      Interval History:  12/31/2024: Patient seen, very lethargic post pain medication administration. Able to tell me she is still having abdominal pain. Last BM 12/28, minimal appetite.     1/2/2025: Patient seen. Still having post prandial abdominal pain, eating 1-2 bites of food in a day. Able to tolerate Ensure and milk. No BM since 12/28. INR added to am labs    Hospital Medications:  Current Facility-Administered Medications   Medication Dose Frequency Provider Last Rate Last Admin    HYDROmorphone (Dilaudid) tablet 4 mg  4 mg Q4HRS PRN Maryanne Venegas M.D.   4 mg at 01/01/25 0604    oxyCODONE immediate release (Roxicodone) tablet 10 mg  10 mg Q4HRS PRN Maryanne Veneags M.D.   10 mg at 01/01/25 0733    folic acid (Folvite) tablet 1 mg  1 mg DAILY Maryanne Venegas M.D.   1 mg at 01/01/25 0603    omeprazole (PriLOSEC) capsule 20 mg  20 mg DAILY Maryanne Venegas M.D.   20 mg at 01/01/25 0604    thiamine (Vitamin B-1) tablet 100 mg  100 mg DAILY Maryanne Venegas M.D.   100 mg at 01/01/25 0603    enoxaparin (Lovenox) inj 40 mg  40 mg DAILY AT 1800 Maryanne Venegas M.D.   40 mg at 12/31/24 1819    DULoxetine (Cymbalta) capsule 60 mg  60 mg DAILY Esteban Stovall M.D.   60 mg at 01/01/25 0604    acetaminophen (Tylenol) tablet 650 mg  650 mg Q6HRS PRN Esteban Stovall M.D.        senna-docusate (Pericolace Or Senokot S) 8.6-50 MG per tablet 2 Tablet  2 Tablet Q EVENING Esteban Stovall M.D.   2 Tablet at 12/31/24 1812     "And    polyethylene glycol/lytes (Miralax) Packet 1 Packet  1 Packet QDAY PRN Esteban Stovall M.D.        ondansetron (Zofran) syringe/vial injection 4 mg  4 mg Q4HRS PRN Esteban Stovall M.D.   4 mg at 01/01/25 0733    ondansetron (Zofran ODT) dispertab 4 mg  4 mg Q4HRS PRN Esteban Stovall M.D.        promethazine (Phenergan) tablet 12.5-25 mg  12.5-25 mg Q4HRS PRN Esteban Stovall M.D.        promethazine (Phenergan) suppository 12.5-25 mg  12.5-25 mg Q4HRS PRN Esteban Stovall M.D.        prochlorperazine (Compazine) injection 5-10 mg  5-10 mg Q4HRS PRN Esteban Stovall M.D.       Last reviewed on 12/30/2024  8:03 AM by Vanesa Tsai       Review of Systems:  Review of Systems   Constitutional:  Positive for weight loss. Negative for chills, fever and malaise/fatigue.   HENT:  Negative for hearing loss.    Eyes:  Negative for blurred vision.   Respiratory:  Negative for cough and shortness of breath.    Cardiovascular:  Negative for chest pain and leg swelling.   Gastrointestinal:  Positive for abdominal pain and constipation. Negative for blood in stool, diarrhea, heartburn, melena, nausea and vomiting.   Genitourinary:  Negative for dysuria.   Musculoskeletal:  Negative for back pain.   Skin:  Negative for rash.   Neurological:  Negative for dizziness and weakness.   Psychiatric/Behavioral:  Negative for depression.    All other systems reviewed and are negative.        Vital signs:  Weight/BMI: Body mass index is 18.51 kg/m².  /69   Pulse 93   Temp 36.9 °C (98.4 °F) (Temporal)   Resp 16   Ht 1.6 m (5' 3\")   Wt 47.4 kg (104 lb 8 oz)   SpO2 93%   Vitals:    01/01/25 0500 01/01/25 0604 01/01/25 0722 01/01/25 0733   BP:   101/69    Pulse:   93    Resp: 15 15 17 16   Temp:   36.9 °C (98.4 °F)    TempSrc:   Temporal    SpO2:   93%    Weight:       Height:         Oxygen Therapy:  Pulse Oximetry: 93 %, O2 (LPM): 0, O2 Delivery Device: None - Room Air  No intake or output data in the 24 hours ending 01/01/25 " 0810      Physical Exam  Vitals and nursing note reviewed.   Constitutional:       General: She is not in acute distress.     Appearance: Normal appearance. She is ill-appearing.   HENT:      Head: Normocephalic and atraumatic.      Right Ear: External ear normal.      Left Ear: External ear normal.      Nose: Nose normal.      Mouth/Throat:      Mouth: Mucous membranes are moist.      Pharynx: Oropharynx is clear.   Eyes:      General: No scleral icterus.  Cardiovascular:      Rate and Rhythm: Normal rate and regular rhythm.      Pulses: Normal pulses.      Heart sounds: Normal heart sounds.   Pulmonary:      Effort: Pulmonary effort is normal. No respiratory distress.      Breath sounds: Normal breath sounds.   Abdominal:      General: Abdomen is flat. Bowel sounds are normal. There is no distension.      Palpations: Abdomen is soft.      Tenderness: There is abdominal tenderness.   Musculoskeletal:         General: Normal range of motion.      Cervical back: Normal range of motion.   Skin:     General: Skin is warm and dry.      Capillary Refill: Capillary refill takes less than 2 seconds.      Coloration: Skin is pale.   Neurological:      Mental Status: She is alert and oriented to person, place, and time.      Motor: No weakness.   Psychiatric:         Mood and Affect: Mood normal.         Behavior: Behavior normal.         Labs:  Recent Labs     12/30/24 0220 01/01/25  0236   SODIUM 136 135   POTASSIUM 3.6 3.9   CHLORIDE 100 99   CO2 24 24   BUN 14 13   CREATININE 0.44* 0.39*   MAGNESIUM 1.8  --    CALCIUM 9.0 9.2     Recent Labs     12/30/24 0220 01/01/25  0236   ALTSGPT 11 9   ASTSGOT 25 33   ALKPHOSPHAT 82 89   TBILIRUBIN 0.4 0.4   LIPASE 47  --    GLUCOSE 121* 105*     Recent Labs     12/30/24 0220 01/01/25  0236   WBC 8.4 9.3   NEUTSPOLYS 64.70 68.00   LYMPHOCYTES 25.70 21.80*   MONOCYTES 6.90 7.30   EOSINOPHILS 1.80 2.40   BASOPHILS 0.50 0.40   ASTSGOT 25 33   ALTSGPT 11 9   ALKPHOSPHAT 82 89    TBILIRUBIN 0.4 0.4     Recent Labs     12/30/24  0220 01/01/25  0236   RBC 3.51* 3.55*   HEMOGLOBIN 10.2* 10.1*   HEMATOCRIT 31.8* 32.4*   PLATELETCT 383 381     Recent Results (from the past 24 hours)   Comp Metabolic Panel    Collection Time: 01/01/25  2:36 AM   Result Value Ref Range    Sodium 135 135 - 145 mmol/L    Potassium 3.9 3.6 - 5.5 mmol/L    Chloride 99 96 - 112 mmol/L    Co2 24 20 - 33 mmol/L    Anion Gap 12.0 7.0 - 16.0    Glucose 105 (H) 65 - 99 mg/dL    Bun 13 8 - 22 mg/dL    Creatinine 0.39 (L) 0.50 - 1.40 mg/dL    Calcium 9.2 8.5 - 10.5 mg/dL    Correct Calcium 9.7 8.5 - 10.5 mg/dL    AST(SGOT) 33 12 - 45 U/L    ALT(SGPT) 9 2 - 50 U/L    Alkaline Phosphatase 89 30 - 99 U/L    Total Bilirubin 0.4 0.1 - 1.5 mg/dL    Albumin 3.4 3.2 - 4.9 g/dL    Total Protein 7.6 6.0 - 8.2 g/dL    Globulin 4.2 (H) 1.9 - 3.5 g/dL    A-G Ratio 0.8 g/dL   CBC WITH DIFFERENTIAL    Collection Time: 01/01/25  2:36 AM   Result Value Ref Range    WBC 9.3 4.8 - 10.8 K/uL    RBC 3.55 (L) 4.20 - 5.40 M/uL    Hemoglobin 10.1 (L) 12.0 - 16.0 g/dL    Hematocrit 32.4 (L) 37.0 - 47.0 %    MCV 91.3 81.4 - 97.8 fL    MCH 28.5 27.0 - 33.0 pg    MCHC 31.2 (L) 32.2 - 35.5 g/dL    RDW 49.1 35.9 - 50.0 fL    Platelet Count 381 164 - 446 K/uL    MPV 10.9 9.0 - 12.9 fL    Neutrophils-Polys 68.00 44.00 - 72.00 %    Lymphocytes 21.80 (L) 22.00 - 41.00 %    Monocytes 7.30 0.00 - 13.40 %    Eosinophils 2.40 0.00 - 6.90 %    Basophils 0.40 0.00 - 1.80 %    Immature Granulocytes 0.10 0.00 - 0.90 %    Nucleated RBC 0.00 0.00 - 0.20 /100 WBC    Neutrophils (Absolute) 6.32 1.82 - 7.42 K/uL    Lymphs (Absolute) 2.03 1.00 - 4.80 K/uL    Monos (Absolute) 0.68 0.00 - 0.85 K/uL    Eos (Absolute) 0.22 0.00 - 0.51 K/uL    Baso (Absolute) 0.04 0.00 - 0.12 K/uL    Immature Granulocytes (abs) 0.01 0.00 - 0.11 K/uL    NRBC (Absolute) 0.00 K/uL   ESTIMATED GFR    Collection Time: 01/01/25  2:36 AM   Result Value Ref Range    GFR (CKD-EPI) 128 >60 mL/min/1.73 m  2       Radiology Review:  CT-PANCREAS AND ABDOMEN WITH & W/O   Final Result         1. Low-density lesion in the tail of the pancreas is stable along with pancreatic ductal dilatation.   2. The areas of soft tissue nodularity along the posterior aspect of the stomach now demonstrate central low density and peripheral enhancement. This may represent pseudocyst or abscess. Necrotic tumor and hematoma would be additional possible    differential considerations.   3. Hepatosplenomegaly.              MDM (Data Review):   -Records reviewed and summarized in current documentation  -I personally reviewed and interpreted the laboratory results  -I personally reviewed the radiology images    Assessment/Recommendations:  Intractable abdominal pain, suspect pancreatic  --Pancreatic duct stone, unable to be removed endoscopically  --Possible small, main duct, IPMN  --AUD, 1 month sobriety and needs 6 months before consideration for pancreatic surgery.  She understands this.  --Anemia  --History of substance abuse     Recommendations:  --Diet as tolerated today  --Follow INR results  --Pain control per primary  --Planning for EUS with cystogastrostomy, celiac plexus block, ERCP tomorrow  --Avoid NSAIDS or DVT prophylaxis  --NPO at midnight     Plan discussed with patient,  Dr. Venegas and Dr. Herrera    ..Lala Quick, DNP,  APRN    Core Quality Measures   Reviewed items::  Labs, Medications and Radiology reports reviewed      Addendum: pt has large peripancreatic fluid collection. Discussed with [facility]. Anticipate EUS guided cystogastrostomy with lumen apposing stent on friday

## 2025-01-01 NOTE — PROGRESS NOTES
Hospital Medicine Daily Progress Note    Date of Service  1/1/2025    Chief Complaint  Brenda Cartagena is a 39 y.o. female admitted 12/30/2024 with abdominal pain     Hospital Course  Brenda Cartagena is a 39 y.o. female with a  past medical history of alcohol dependence and methamphetamine abuse last use was over 1 month ago who has had multiple admissions due to abdominal pain and pancreatitis.  In October she underwent EGD with gastritis and MRCP revealing pancreatic ductal dilation with stone and debris and a cystic lesion consistent with intraductal papillary mucinous neoplasm She subsequently underwent endoscopic ultrasound by Dr. Dawson gastroenterology revealing a stone lodged in the genu of the pancreas that was not able to be removed.  She stopped drinking and using methamphetamine and was evaluated by Dr. Byers hepatobiliary surgery.  She felt that her condition could be consistent with intraductal papillary mucinous neoplasm and recommended possible Puestow procedure to relieve the chronic pancreatitis but noted that she must have 6 months of complete sobriety from alcohol first.  She was discharged on 12/23/2024 with 5 days worth of oxycodone.  She ran out of the medications and in [place] there are no primary care providers taking new patients therefore presented to [facility] with a day and a half of severe abdominal pain and vomiting.  Lipase is normal labs are unremarkable. Hepatobiliary surgery was again consulted as was GI.  In the meantime she will be given IV fluids, pain control and her diet will be advanced if tolerated.    Interval Problem Update  Pt seen at bedside, she still notes pain and decreased oral intake, she does still appear sedating from medications, I have added scheduled tylenol, continue current pain regiment   - vitals reviewed and stable, BP soft  - labs reviewed and stable   - hepatobiliary surgery following   - GI following, case discussed, planning for ERCP  with possible stent, EUS, celiac block on Friday 1/3/2024      I have discussed this patient's plan of care and discharge plan at IDT rounds today with Case Management, Nursing, Nursing leadership, and other members of the IDT team.    Consultants/Specialty  GI and Hepatobiliary surgery    Code Status  Full Code    Disposition  The patient is not medically cleared for discharge to home or a post-acute facility.      I have placed the appropriate orders for post-discharge needs.    Review of Systems  Review of Systems   Constitutional:  Positive for weight loss. Negative for fever.   Respiratory:  Negative for shortness of breath.    Gastrointestinal:  Positive for abdominal pain and nausea. Negative for vomiting.        Physical Exam  Temp:  [36.1 °C (96.9 °F)-36.9 °C (98.4 °F)] 36.9 °C (98.4 °F)  Pulse:  [] 93  Resp:  [14-17] 16  BP: ()/(59-69) 101/69  SpO2:  [93 %-95 %] 93 %    Physical Exam  Vitals and nursing note reviewed.   Constitutional:       Appearance: She is ill-appearing.      Comments: Drowsy female    HENT:      Head: Normocephalic and atraumatic.      Mouth/Throat:      Mouth: Mucous membranes are dry.   Eyes:      Conjunctiva/sclera: Conjunctivae normal.   Cardiovascular:      Rate and Rhythm: Tachycardia present.   Pulmonary:      Effort: Pulmonary effort is normal.   Abdominal:      General: There is no distension.      Palpations: Abdomen is soft.      Tenderness: There is abdominal tenderness. There is no guarding or rebound.   Musculoskeletal:         General: Normal range of motion.   Skin:     General: Skin is warm.   Neurological:      Mental Status: She is oriented to person, place, and time.   Psychiatric:         Mood and Affect: Mood normal.         Behavior: Behavior normal.         Fluids    Intake/Output Summary (Last 24 hours) at 1/1/2025 1329  Last data filed at 1/1/2025 1000  Gross per 24 hour   Intake 120 ml   Output --   Net 120 ml        Laboratory  Recent Labs      12/30/24 0220 01/01/25  0236   WBC 8.4 9.3   RBC 3.51* 3.55*   HEMOGLOBIN 10.2* 10.1*   HEMATOCRIT 31.8* 32.4*   MCV 90.6 91.3   MCH 29.1 28.5   MCHC 32.1* 31.2*   RDW 50.2* 49.1   PLATELETCT 383 381   MPV 10.7 10.9     Recent Labs     12/30/24 0220 01/01/25  0236   SODIUM 136 135   POTASSIUM 3.6 3.9   CHLORIDE 100 99   CO2 24 24   GLUCOSE 121* 105*   BUN 14 13   CREATININE 0.44* 0.39*   CALCIUM 9.0 9.2                   Imaging  CT-PANCREAS AND ABDOMEN WITH & W/O   Final Result         1. Low-density lesion in the tail of the pancreas is stable along with pancreatic ductal dilatation.   2. The areas of soft tissue nodularity along the posterior aspect of the stomach now demonstrate central low density and peripheral enhancement. This may represent pseudocyst or abscess. Necrotic tumor and hematoma would be additional possible    differential considerations.   3. Hepatosplenomegaly.                 Assessment/Plan  * Abdominal pain- (present on admission)  Assessment & Plan  Complex situation for which she has undergone EGD multiple imaging studies and most consistent with intraductal papillary mucinous neoplasm with chronic pancreatic pain.   Diet as tolerated   I have adjusted analgesia today, added scheduled tylenol   Planning for procedure this Friday 1/3 with GI     IPMN (intraductal papillary mucinous neoplasm)- (present on admission)  Assessment & Plan  She has been seen by Dr. Byers for consideration of Puestow procedure but needs 6 months of sobriety.  She has not drank in over 1 month.  hepatobiliary surgery has been reconsulted   Pancreatic CT ordered and reviewed  GI consulted, I discussed case with team, planning for ERCP w/ potential stent/EUS/ciliac block, procedure planned for Friday 1/3/2025     Pancreatic duct stones- (present on admission)  Assessment & Plan  Chronic condition  GI following, procedure planned for 1/3    Normocytic anemia- (present on admission)  Assessment & Plan  Chronic,  stable   No signs of bleeding, will trend labs          VTE prophylaxis:    enoxaparin ppx      I have performed a physical exam and reviewed and updated ROS and Plan today (1/1/2025). In review of yesterday's note (12/31/2024), there are no changes except as documented above.

## 2025-01-02 LAB
ALBUMIN SERPL BCP-MCNC: 3.1 G/DL (ref 3.2–4.9)
ALBUMIN/GLOB SERPL: 0.8 G/DL
ALP SERPL-CCNC: 96 U/L (ref 30–99)
ALT SERPL-CCNC: 7 U/L (ref 2–50)
ANION GAP SERPL CALC-SCNC: 10 MMOL/L (ref 7–16)
AST SERPL-CCNC: 35 U/L (ref 12–45)
BILIRUB SERPL-MCNC: 0.3 MG/DL (ref 0.1–1.5)
BUN SERPL-MCNC: 18 MG/DL (ref 8–22)
CALCIUM ALBUM COR SERPL-MCNC: 9.8 MG/DL (ref 8.5–10.5)
CALCIUM SERPL-MCNC: 9.1 MG/DL (ref 8.5–10.5)
CHLORIDE SERPL-SCNC: 102 MMOL/L (ref 96–112)
CO2 SERPL-SCNC: 26 MMOL/L (ref 20–33)
CREAT SERPL-MCNC: 0.43 MG/DL (ref 0.5–1.4)
GFR SERPLBLD CREATININE-BSD FMLA CKD-EPI: 125 ML/MIN/1.73 M 2
GLOBULIN SER CALC-MCNC: 4.1 G/DL (ref 1.9–3.5)
GLUCOSE SERPL-MCNC: 115 MG/DL (ref 65–99)
INR PPP: 1.07 (ref 0.87–1.13)
POTASSIUM SERPL-SCNC: 4.2 MMOL/L (ref 3.6–5.5)
PROT SERPL-MCNC: 7.2 G/DL (ref 6–8.2)
PROTHROMBIN TIME: 14 SEC (ref 12–14.6)
SODIUM SERPL-SCNC: 138 MMOL/L (ref 135–145)

## 2025-01-02 PROCEDURE — A9270 NON-COVERED ITEM OR SERVICE: HCPCS | Performed by: STUDENT IN AN ORGANIZED HEALTH CARE EDUCATION/TRAINING PROGRAM

## 2025-01-02 PROCEDURE — 85610 PROTHROMBIN TIME: CPT

## 2025-01-02 PROCEDURE — 700111 HCHG RX REV CODE 636 W/ 250 OVERRIDE (IP)

## 2025-01-02 PROCEDURE — 700111 HCHG RX REV CODE 636 W/ 250 OVERRIDE (IP): Performed by: HOSPITALIST

## 2025-01-02 PROCEDURE — 80053 COMPREHEN METABOLIC PANEL: CPT

## 2025-01-02 PROCEDURE — 36415 COLL VENOUS BLD VENIPUNCTURE: CPT

## 2025-01-02 PROCEDURE — 99232 SBSQ HOSP IP/OBS MODERATE 35: CPT | Performed by: NURSE PRACTITIONER

## 2025-01-02 PROCEDURE — 700102 HCHG RX REV CODE 250 W/ 637 OVERRIDE(OP): Performed by: HOSPITALIST

## 2025-01-02 PROCEDURE — 770006 HCHG ROOM/CARE - MED/SURG/GYN SEMI*

## 2025-01-02 PROCEDURE — 99232 SBSQ HOSP IP/OBS MODERATE 35: CPT | Performed by: STUDENT IN AN ORGANIZED HEALTH CARE EDUCATION/TRAINING PROGRAM

## 2025-01-02 PROCEDURE — 700102 HCHG RX REV CODE 250 W/ 637 OVERRIDE(OP): Performed by: STUDENT IN AN ORGANIZED HEALTH CARE EDUCATION/TRAINING PROGRAM

## 2025-01-02 PROCEDURE — A9270 NON-COVERED ITEM OR SERVICE: HCPCS | Performed by: HOSPITALIST

## 2025-01-02 RX ORDER — HYDROMORPHONE HYDROCHLORIDE 1 MG/ML
0.5 INJECTION, SOLUTION INTRAMUSCULAR; INTRAVENOUS; SUBCUTANEOUS ONCE
Status: COMPLETED | OUTPATIENT
Start: 2025-01-02 | End: 2025-01-02

## 2025-01-02 RX ORDER — BENZOCAINE/MENTHOL 6 MG-10 MG
LOZENGE MUCOUS MEMBRANE 2 TIMES DAILY
Status: DISCONTINUED | OUTPATIENT
Start: 2025-01-02 | End: 2025-01-05 | Stop reason: HOSPADM

## 2025-01-02 RX ADMIN — HYDROMORPHONE HYDROCHLORIDE 4 MG: 4 TABLET ORAL at 07:53

## 2025-01-02 RX ADMIN — ONDANSETRON 4 MG: 4 TABLET, ORALLY DISINTEGRATING ORAL at 16:22

## 2025-01-02 RX ADMIN — ACETAMINOPHEN 1000 MG: 500 TABLET, FILM COATED ORAL at 21:45

## 2025-01-02 RX ADMIN — OXYCODONE HYDROCHLORIDE 10 MG: 10 TABLET ORAL at 03:47

## 2025-01-02 RX ADMIN — OXYCODONE HYDROCHLORIDE 10 MG: 10 TABLET ORAL at 19:47

## 2025-01-02 RX ADMIN — OXYCODONE HYDROCHLORIDE 10 MG: 10 TABLET ORAL at 15:20

## 2025-01-02 RX ADMIN — ONDANSETRON 4 MG: 2 INJECTION INTRAMUSCULAR; INTRAVENOUS at 16:44

## 2025-01-02 RX ADMIN — HYDROMORPHONE HYDROCHLORIDE 4 MG: 4 TABLET ORAL at 01:32

## 2025-01-02 RX ADMIN — FOLIC ACID 1 MG: 1 TABLET ORAL at 04:59

## 2025-01-02 RX ADMIN — ACETAMINOPHEN 1000 MG: 500 TABLET, FILM COATED ORAL at 15:24

## 2025-01-02 RX ADMIN — HYDROCORTISONE: 1 CREAM TOPICAL at 17:32

## 2025-01-02 RX ADMIN — HYDROMORPHONE HYDROCHLORIDE 4 MG: 4 TABLET ORAL at 17:31

## 2025-01-02 RX ADMIN — HYDROMORPHONE HYDROCHLORIDE 0.5 MG: 1 INJECTION, SOLUTION INTRAMUSCULAR; INTRAVENOUS; SUBCUTANEOUS at 04:59

## 2025-01-02 RX ADMIN — HYDROMORPHONE HYDROCHLORIDE 4 MG: 4 TABLET ORAL at 12:43

## 2025-01-02 RX ADMIN — DULOXETINE HYDROCHLORIDE 60 MG: 30 CAPSULE, DELAYED RELEASE ORAL at 04:59

## 2025-01-02 RX ADMIN — OMEPRAZOLE 20 MG: 20 CAPSULE, DELAYED RELEASE ORAL at 04:59

## 2025-01-02 RX ADMIN — ACETAMINOPHEN 1000 MG: 500 TABLET, FILM COATED ORAL at 08:32

## 2025-01-02 RX ADMIN — SENNOSIDES AND DOCUSATE SODIUM 2 TABLET: 50; 8.6 TABLET ORAL at 17:31

## 2025-01-02 RX ADMIN — HYDROMORPHONE HYDROCHLORIDE 4 MG: 4 TABLET ORAL at 21:45

## 2025-01-02 RX ADMIN — Medication 100 MG: at 04:59

## 2025-01-02 RX ADMIN — OXYCODONE HYDROCHLORIDE 10 MG: 10 TABLET ORAL at 11:25

## 2025-01-02 ASSESSMENT — PAIN DESCRIPTION - PAIN TYPE
TYPE: ACUTE PAIN

## 2025-01-02 ASSESSMENT — ENCOUNTER SYMPTOMS
BLOOD IN STOOL: 0
COUGH: 0
CHILLS: 0
SHORTNESS OF BREATH: 0
BLURRED VISION: 0
DIZZINESS: 0
WEIGHT LOSS: 1
DEPRESSION: 0
NAUSEA: 0
HEARTBURN: 0
WEAKNESS: 0
BACK PAIN: 0
VOMITING: 0
DIARRHEA: 0
CONSTIPATION: 1
FEVER: 0
NAUSEA: 1
ABDOMINAL PAIN: 1

## 2025-01-02 ASSESSMENT — FIBROSIS 4 INDEX: FIB4 SCORE: 1.42

## 2025-01-02 ASSESSMENT — PATIENT HEALTH QUESTIONNAIRE - PHQ9
1. LITTLE INTEREST OR PLEASURE IN DOING THINGS: NOT AT ALL
2. FEELING DOWN, DEPRESSED, IRRITABLE, OR HOPELESS: NOT AT ALL
SUM OF ALL RESPONSES TO PHQ9 QUESTIONS 1 AND 2: 0

## 2025-01-02 NOTE — PROGRESS NOTES
Hospital Medicine Daily Progress Note    Date of Service  1/2/2025    Chief Complaint  Brenda Cartagena is a 39 y.o. female admitted 12/30/2024 with abdominal pain     Hospital Course  Brenda Cartagena is a 39 y.o. female with a  past medical history of alcohol dependence and methamphetamine abuse last use was over 1 month ago who has had multiple admissions due to abdominal pain and pancreatitis.  In October she underwent EGD with gastritis and MRCP revealing pancreatic ductal dilation with stone and debris and a cystic lesion consistent with intraductal papillary mucinous neoplasm She subsequently underwent endoscopic ultrasound by Dr. Dawson gastroenterology revealing a stone lodged in the genu of the pancreas that was not able to be removed.  She stopped drinking and using methamphetamine and was evaluated by Dr. Byers hepatobiliary surgery.  She felt that her condition could be consistent with intraductal papillary mucinous neoplasm and recommended possible Puestow procedure to relieve the chronic pancreatitis but noted that she must have 6 months of complete sobriety from alcohol first.  She was discharged on 12/23/2024 with 5 days worth of oxycodone.  She ran out of the medications and in [place] there are no primary care providers taking new patients therefore presented to [facility] with a day and a half of severe abdominal pain and vomiting.  Lipase is normal labs are unremarkable. Hepatobiliary surgery was again consulted as was GI.  In the meantime she will be given IV fluids, pain control and her diet will be advanced if tolerated.    Interval Problem Update  Pt seen at bedside, she still notes pain and decreased oral intake, sleeping on and off most of the day   - continue multimodal pain control   - vitals reviewed and stable, BP soft  - labs reviewed and stable   - GI following, case discussed, planning for ERCP with possible stent, EUS, celiac block tomorrow Friday 1/3/2024, NPO at  [facility]      I have discussed this patient's plan of care and discharge plan at IDT rounds today with Case Management, Nursing, Nursing leadership, and other members of the IDT team.    Consultants/Specialty  GI and Hepatobiliary surgery    Code Status  Full Code    Disposition  The patient is not medically cleared for discharge to home or a post-acute facility.  Anticipate discharge to: home with close outpatient follow-up    I have placed the appropriate orders for post-discharge needs.    Review of Systems  Review of Systems   Constitutional:  Positive for weight loss. Negative for fever.   Respiratory:  Negative for shortness of breath.    Gastrointestinal:  Positive for abdominal pain and nausea. Negative for vomiting.        Physical Exam  Temp:  [36 °C (96.8 °F)-36.4 °C (97.6 °F)] 36.4 °C (97.6 °F)  Pulse:  [] 100  Resp:  [15-18] 18  BP: ()/(64-75) 99/72  SpO2:  [91 %-97 %] 93 %    Physical Exam  Vitals and nursing note reviewed.   Constitutional:       Appearance: She is ill-appearing.   HENT:      Head: Normocephalic and atraumatic.      Mouth/Throat:      Mouth: Mucous membranes are dry.   Eyes:      Conjunctiva/sclera: Conjunctivae normal.   Cardiovascular:      Rate and Rhythm: Tachycardia present.   Pulmonary:      Effort: Pulmonary effort is normal.   Abdominal:      General: There is no distension.      Palpations: Abdomen is soft.      Tenderness: There is abdominal tenderness. There is no guarding or rebound.   Musculoskeletal:         General: Normal range of motion.   Skin:     General: Skin is warm.   Neurological:      Mental Status: She is oriented to person, place, and time.   Psychiatric:         Mood and Affect: Mood normal.         Behavior: Behavior normal.         Fluids    Intake/Output Summary (Last 24 hours) at 1/2/2025 1514  Last data filed at 1/2/2025 0900  Gross per 24 hour   Intake 200 ml   Output --   Net 200 ml        Laboratory  Recent Labs     01/01/25  0236   WBC 9.3    RBC 3.55*   HEMOGLOBIN 10.1*   HEMATOCRIT 32.4*   MCV 91.3   MCH 28.5   MCHC 31.2*   RDW 49.1   PLATELETCT 381   MPV 10.9     Recent Labs     01/01/25  0236 01/02/25  0136   SODIUM 135 138   POTASSIUM 3.9 4.2   CHLORIDE 99 102   CO2 24 26   GLUCOSE 105* 115*   BUN 13 18   CREATININE 0.39* 0.43*   CALCIUM 9.2 9.1     Recent Labs     01/02/25  1132   INR 1.07               Imaging  CT-PANCREAS AND ABDOMEN WITH & W/O   Final Result         1. Low-density lesion in the tail of the pancreas is stable along with pancreatic ductal dilatation.   2. The areas of soft tissue nodularity along the posterior aspect of the stomach now demonstrate central low density and peripheral enhancement. This may represent pseudocyst or abscess. Necrotic tumor and hematoma would be additional possible    differential considerations.   3. Hepatosplenomegaly.                 Assessment/Plan  * Abdominal pain- (present on admission)  Assessment & Plan  Complex situation for which she has undergone EGD multiple imaging studies and most consistent with intraductal papillary mucinous neoplasm with chronic pancreatic pain.   Diet as tolerated   I have adjusted analgesia today, added scheduled tylenol   Planning for procedure this Friday 1/3 with GI     Pancreatitis without necrosis or infection- (present on admission)  Assessment & Plan  Chronic pancreatitis with associated IPMN  OP follow up with hepatobiliary surgery   [place] for consideration of Puestow procedure but needs 6 months of sobriety.    IPMN (intraductal papillary mucinous neoplasm)- (present on admission)  Assessment & Plan  She has been seen by Dr. Byers for consideration of Puestow procedure but needs 6 months of sobriety.  She has not drank in over 1 month.  hepatobiliary surgery has been reconsulted   Pancreatic CT ordered and reviewed  GI following, I discussed case with team, planning for ERCP w/ potential stent/EUS/ciliac block, procedure planned for Friday 1/3/2025    NPO at [place]    Pancreatic duct stones- (present on admission)  Assessment & Plan  Chronic condition  GI following, procedure planned for 1/3    Normocytic anemia- (present on admission)  Assessment & Plan  Chronic, stable   No signs of bleeding, will trend labs          VTE prophylaxis:   SCDs/TEDs      I have performed a physical exam and reviewed and updated ROS and Plan today (1/2/2025). In review of yesterday's note (1/1/2025), there are no changes except as documented above.

## 2025-01-02 NOTE — PROGRESS NOTES
Received report and assumed care of patient at change of shift. Patient is A&Ox4, on RA, and reports abdominal pain of 9/10 at this time, medicated per MAR. Patient assessment completed, bed in lowest position, and call light and personal belongings are within reach. Patient expressed no further needs at this time.

## 2025-01-02 NOTE — PROGRESS NOTES
Report received from NOC RN and assumed patient care at 0700. Patient is A&Ox 4, on RA, and up self . Patient reporting a pain level of 9/10; medicated appropriately according to the MAR. Call light within reach and bed in lowest position. Reinforced the need to call for assistance. Plan of care discussed and patient does not have any further needs at this time.

## 2025-01-02 NOTE — ASSESSMENT & PLAN NOTE
Chronic pancreatitis with associated IPMN  OP follow up with hepatobiliary surgery   [place] for consideration of Puestow procedure but needs 6 months of sobriety.  S/p ERCP with biopsy, stent insertion, calculus removal, sphincterotomy, bile duct dilation and celiac block  Ongoing issus with pain management and poor intake

## 2025-01-03 LAB
ALBUMIN SERPL BCP-MCNC: 2.8 G/DL (ref 3.2–4.9)
ALBUMIN/GLOB SERPL: 0.7 G/DL
ALP SERPL-CCNC: 87 U/L (ref 30–99)
ALT SERPL-CCNC: 9 U/L (ref 2–50)
ANION GAP SERPL CALC-SCNC: 10 MMOL/L (ref 7–16)
AST SERPL-CCNC: 34 U/L (ref 12–45)
BASOPHILS # BLD AUTO: 0.3 % (ref 0–1.8)
BASOPHILS # BLD: 0.02 K/UL (ref 0–0.12)
BILIRUB SERPL-MCNC: 0.2 MG/DL (ref 0.1–1.5)
BUN SERPL-MCNC: 16 MG/DL (ref 8–22)
CALCIUM ALBUM COR SERPL-MCNC: 10 MG/DL (ref 8.5–10.5)
CALCIUM SERPL-MCNC: 9 MG/DL (ref 8.5–10.5)
CHLORIDE SERPL-SCNC: 102 MMOL/L (ref 96–112)
CO2 SERPL-SCNC: 25 MMOL/L (ref 20–33)
CREAT SERPL-MCNC: 0.33 MG/DL (ref 0.5–1.4)
EOSINOPHIL # BLD AUTO: 0.16 K/UL (ref 0–0.51)
EOSINOPHIL NFR BLD: 2.7 % (ref 0–6.9)
ERYTHROCYTE [DISTWIDTH] IN BLOOD BY AUTOMATED COUNT: 48.4 FL (ref 35.9–50)
GFR SERPLBLD CREATININE-BSD FMLA CKD-EPI: 133 ML/MIN/1.73 M 2
GLOBULIN SER CALC-MCNC: 4.3 G/DL (ref 1.9–3.5)
GLUCOSE SERPL-MCNC: 102 MG/DL (ref 65–99)
HCG UR QL: NEGATIVE
HCT VFR BLD AUTO: 32.1 % (ref 37–47)
HGB BLD-MCNC: 10.1 G/DL (ref 12–16)
IMM GRANULOCYTES # BLD AUTO: 0.03 K/UL (ref 0–0.11)
IMM GRANULOCYTES NFR BLD AUTO: 0.5 % (ref 0–0.9)
LYMPHOCYTES # BLD AUTO: 1.49 K/UL (ref 1–4.8)
LYMPHOCYTES NFR BLD: 24.8 % (ref 22–41)
MCH RBC QN AUTO: 28.3 PG (ref 27–33)
MCHC RBC AUTO-ENTMCNC: 31.5 G/DL (ref 32.2–35.5)
MCV RBC AUTO: 89.9 FL (ref 81.4–97.8)
MONOCYTES # BLD AUTO: 0.49 K/UL (ref 0–0.85)
MONOCYTES NFR BLD AUTO: 8.1 % (ref 0–13.4)
NEUTROPHILS # BLD AUTO: 3.83 K/UL (ref 1.82–7.42)
NEUTROPHILS NFR BLD: 63.6 % (ref 44–72)
NRBC # BLD AUTO: 0 K/UL
NRBC BLD-RTO: 0 /100 WBC (ref 0–0.2)
PLATELET # BLD AUTO: 331 K/UL (ref 164–446)
PMV BLD AUTO: 10.4 FL (ref 9–12.9)
POTASSIUM SERPL-SCNC: 3.7 MMOL/L (ref 3.6–5.5)
PROT SERPL-MCNC: 7.1 G/DL (ref 6–8.2)
RBC # BLD AUTO: 3.57 M/UL (ref 4.2–5.4)
SODIUM SERPL-SCNC: 137 MMOL/L (ref 135–145)
WBC # BLD AUTO: 6 K/UL (ref 4.8–10.8)

## 2025-01-03 PROCEDURE — 160048 HCHG OR STATISTICAL LEVEL 1-5: Performed by: INTERNAL MEDICINE

## 2025-01-03 PROCEDURE — C1726 CATH, BAL DIL, NON-VASCULAR: HCPCS | Performed by: INTERNAL MEDICINE

## 2025-01-03 PROCEDURE — 0F7D8DZ DILATION OF PANCREATIC DUCT WITH INTRALUMINAL DEVICE, VIA NATURAL OR ARTIFICIAL OPENING ENDOSCOPIC: ICD-10-PCS | Performed by: INTERNAL MEDICINE

## 2025-01-03 PROCEDURE — 160002 HCHG RECOVERY MINUTES (STAT): Performed by: INTERNAL MEDICINE

## 2025-01-03 PROCEDURE — 700117 HCHG RX CONTRAST REV CODE 255: Performed by: INTERNAL MEDICINE

## 2025-01-03 PROCEDURE — 36415 COLL VENOUS BLD VENIPUNCTURE: CPT

## 2025-01-03 PROCEDURE — A9270 NON-COVERED ITEM OR SERVICE: HCPCS

## 2025-01-03 PROCEDURE — 700102 HCHG RX REV CODE 250 W/ 637 OVERRIDE(OP): Performed by: HOSPITALIST

## 2025-01-03 PROCEDURE — C1889 IMPLANT/INSERT DEVICE, NOC: HCPCS | Performed by: INTERNAL MEDICINE

## 2025-01-03 PROCEDURE — 0FCD8ZZ EXTIRPATION OF MATTER FROM PANCREATIC DUCT, VIA NATURAL OR ARTIFICIAL OPENING ENDOSCOPIC: ICD-10-PCS | Performed by: INTERNAL MEDICINE

## 2025-01-03 PROCEDURE — A9270 NON-COVERED ITEM OR SERVICE: HCPCS | Performed by: STUDENT IN AN ORGANIZED HEALTH CARE EDUCATION/TRAINING PROGRAM

## 2025-01-03 PROCEDURE — 700102 HCHG RX REV CODE 250 W/ 637 OVERRIDE(OP)

## 2025-01-03 PROCEDURE — 3E0T3BZ INTRODUCTION OF ANESTHETIC AGENT INTO PERIPHERAL NERVES AND PLEXI, PERCUTANEOUS APPROACH: ICD-10-PCS | Performed by: INTERNAL MEDICINE

## 2025-01-03 PROCEDURE — 160029 HCHG SURGERY MINUTES - 1ST 30 MINS LEVEL 4: Performed by: INTERNAL MEDICINE

## 2025-01-03 PROCEDURE — 43238 EGD US FINE NEEDLE BX/ASPIR: CPT | Performed by: INTERNAL MEDICINE

## 2025-01-03 PROCEDURE — 43264 ERCP REMOVE DUCT CALCULI: CPT | Performed by: INTERNAL MEDICINE

## 2025-01-03 PROCEDURE — C1769 GUIDE WIRE: HCPCS | Performed by: INTERNAL MEDICINE

## 2025-01-03 PROCEDURE — 160009 HCHG ANES TIME/MIN: Performed by: INTERNAL MEDICINE

## 2025-01-03 PROCEDURE — 700102 HCHG RX REV CODE 250 W/ 637 OVERRIDE(OP): Performed by: STUDENT IN AN ORGANIZED HEALTH CARE EDUCATION/TRAINING PROGRAM

## 2025-01-03 PROCEDURE — 99233 SBSQ HOSP IP/OBS HIGH 50: CPT | Performed by: STUDENT IN AN ORGANIZED HEALTH CARE EDUCATION/TRAINING PROGRAM

## 2025-01-03 PROCEDURE — C2617 STENT, NON-COR, TEM W/O DEL: HCPCS | Performed by: INTERNAL MEDICINE

## 2025-01-03 PROCEDURE — 770006 HCHG ROOM/CARE - MED/SURG/GYN SEMI*

## 2025-01-03 PROCEDURE — 160041 HCHG SURGERY MINUTES - EA ADDL 1 MIN LEVEL 4: Performed by: INTERNAL MEDICINE

## 2025-01-03 PROCEDURE — 43262 ENDO CHOLANGIOPANCREATOGRAPH: CPT | Performed by: INTERNAL MEDICINE

## 2025-01-03 PROCEDURE — 700111 HCHG RX REV CODE 636 W/ 250 OVERRIDE (IP): Mod: JZ | Performed by: HOSPITALIST

## 2025-01-03 PROCEDURE — A9270 NON-COVERED ITEM OR SERVICE: HCPCS | Performed by: HOSPITALIST

## 2025-01-03 PROCEDURE — 85025 COMPLETE CBC W/AUTO DIFF WBC: CPT

## 2025-01-03 PROCEDURE — 160035 HCHG PACU - 1ST 60 MINS PHASE I: Performed by: INTERNAL MEDICINE

## 2025-01-03 PROCEDURE — 81025 URINE PREGNANCY TEST: CPT

## 2025-01-03 PROCEDURE — 700111 HCHG RX REV CODE 636 W/ 250 OVERRIDE (IP)

## 2025-01-03 PROCEDURE — 700105 HCHG RX REV CODE 258

## 2025-01-03 PROCEDURE — 80053 COMPREHEN METABOLIC PANEL: CPT

## 2025-01-03 DEVICE — STENT PANCREATIC SINGLE PIGTAIL 4FR X 11CM (1EA): Type: IMPLANTABLE DEVICE | Site: BILE DUCT | Status: FUNCTIONAL

## 2025-01-03 RX ORDER — DIPHENHYDRAMINE HYDROCHLORIDE 50 MG/ML
12.5 INJECTION INTRAMUSCULAR; INTRAVENOUS
Status: DISCONTINUED | OUTPATIENT
Start: 2025-01-03 | End: 2025-01-03 | Stop reason: HOSPADM

## 2025-01-03 RX ORDER — HYDRALAZINE HYDROCHLORIDE 20 MG/ML
5 INJECTION INTRAMUSCULAR; INTRAVENOUS
Status: DISCONTINUED | OUTPATIENT
Start: 2025-01-03 | End: 2025-01-03 | Stop reason: HOSPADM

## 2025-01-03 RX ORDER — TRIAMCINOLONE ACETONIDE 40 MG/ML
INJECTION, SUSPENSION INTRA-ARTICULAR; INTRAMUSCULAR
Status: COMPLETED
Start: 2025-01-03 | End: 2025-01-03

## 2025-01-03 RX ORDER — HYDROMORPHONE HYDROCHLORIDE 1 MG/ML
0.4 INJECTION, SOLUTION INTRAMUSCULAR; INTRAVENOUS; SUBCUTANEOUS
Status: DISCONTINUED | OUTPATIENT
Start: 2025-01-03 | End: 2025-01-03 | Stop reason: HOSPADM

## 2025-01-03 RX ORDER — SODIUM CHLORIDE, SODIUM LACTATE, POTASSIUM CHLORIDE, AND CALCIUM CHLORIDE .6; .31; .03; .02 G/100ML; G/100ML; G/100ML; G/100ML
250 INJECTION, SOLUTION INTRAVENOUS ONCE
Status: COMPLETED | OUTPATIENT
Start: 2025-01-03 | End: 2025-01-03

## 2025-01-03 RX ORDER — OXYCODONE HCL 5 MG/5 ML
5 SOLUTION, ORAL ORAL
Status: DISCONTINUED | OUTPATIENT
Start: 2025-01-03 | End: 2025-01-03 | Stop reason: HOSPADM

## 2025-01-03 RX ORDER — EPHEDRINE SULFATE 50 MG/ML
5 INJECTION, SOLUTION INTRAVENOUS
Status: DISCONTINUED | OUTPATIENT
Start: 2025-01-03 | End: 2025-01-03 | Stop reason: HOSPADM

## 2025-01-03 RX ORDER — HYDROMORPHONE HYDROCHLORIDE 1 MG/ML
0.1 INJECTION, SOLUTION INTRAMUSCULAR; INTRAVENOUS; SUBCUTANEOUS
Status: DISCONTINUED | OUTPATIENT
Start: 2025-01-03 | End: 2025-01-03 | Stop reason: HOSPADM

## 2025-01-03 RX ORDER — OXYCODONE HCL 5 MG/5 ML
10 SOLUTION, ORAL ORAL
Status: DISCONTINUED | OUTPATIENT
Start: 2025-01-03 | End: 2025-01-03 | Stop reason: HOSPADM

## 2025-01-03 RX ORDER — SODIUM CHLORIDE, SODIUM LACTATE, POTASSIUM CHLORIDE, CALCIUM CHLORIDE 600; 310; 30; 20 MG/100ML; MG/100ML; MG/100ML; MG/100ML
INJECTION, SOLUTION INTRAVENOUS CONTINUOUS
Status: DISCONTINUED | OUTPATIENT
Start: 2025-01-04 | End: 2025-01-04

## 2025-01-03 RX ORDER — METHOCARBAMOL 100 MG/ML
1000 INJECTION, SOLUTION INTRAMUSCULAR; INTRAVENOUS
Status: DISCONTINUED | OUTPATIENT
Start: 2025-01-03 | End: 2025-01-03 | Stop reason: HOSPADM

## 2025-01-03 RX ORDER — HALOPERIDOL 5 MG/ML
1 INJECTION INTRAMUSCULAR
Status: DISCONTINUED | OUTPATIENT
Start: 2025-01-03 | End: 2025-01-03 | Stop reason: HOSPADM

## 2025-01-03 RX ORDER — OXYCODONE HYDROCHLORIDE 10 MG/1
10 TABLET ORAL
Status: DISCONTINUED | OUTPATIENT
Start: 2025-01-03 | End: 2025-01-05 | Stop reason: HOSPADM

## 2025-01-03 RX ORDER — BUPIVACAINE HYDROCHLORIDE 2.5 MG/ML
INJECTION, SOLUTION EPIDURAL; INFILTRATION; INTRACAUDAL
Status: COMPLETED
Start: 2025-01-03 | End: 2025-01-03

## 2025-01-03 RX ORDER — ONDANSETRON 2 MG/ML
4 INJECTION INTRAMUSCULAR; INTRAVENOUS
Status: DISCONTINUED | OUTPATIENT
Start: 2025-01-03 | End: 2025-01-03 | Stop reason: HOSPADM

## 2025-01-03 RX ORDER — HYDROMORPHONE HYDROCHLORIDE 1 MG/ML
0.5 INJECTION, SOLUTION INTRAMUSCULAR; INTRAVENOUS; SUBCUTANEOUS ONCE
Status: COMPLETED | OUTPATIENT
Start: 2025-01-03 | End: 2025-01-03

## 2025-01-03 RX ORDER — SODIUM CHLORIDE, SODIUM LACTATE, POTASSIUM CHLORIDE, AND CALCIUM CHLORIDE .6; .31; .03; .02 G/100ML; G/100ML; G/100ML; G/100ML
250 INJECTION, SOLUTION INTRAVENOUS ONCE
Status: COMPLETED | OUTPATIENT
Start: 2025-01-04 | End: 2025-01-03

## 2025-01-03 RX ORDER — INDOMETHACIN 100 MG
SUPPOSITORY, RECTAL RECTAL
Status: COMPLETED
Start: 2025-01-03 | End: 2025-01-03

## 2025-01-03 RX ORDER — ALBUTEROL SULFATE 5 MG/ML
2.5 SOLUTION RESPIRATORY (INHALATION)
Status: DISCONTINUED | OUTPATIENT
Start: 2025-01-03 | End: 2025-01-03 | Stop reason: HOSPADM

## 2025-01-03 RX ORDER — LABETALOL HYDROCHLORIDE 5 MG/ML
5 INJECTION, SOLUTION INTRAVENOUS
Status: DISCONTINUED | OUTPATIENT
Start: 2025-01-03 | End: 2025-01-03 | Stop reason: HOSPADM

## 2025-01-03 RX ORDER — HYDROMORPHONE HYDROCHLORIDE 1 MG/ML
0.2 INJECTION, SOLUTION INTRAMUSCULAR; INTRAVENOUS; SUBCUTANEOUS
Status: DISCONTINUED | OUTPATIENT
Start: 2025-01-03 | End: 2025-01-03 | Stop reason: HOSPADM

## 2025-01-03 RX ADMIN — ONDANSETRON 4 MG: 2 INJECTION INTRAMUSCULAR; INTRAVENOUS at 03:31

## 2025-01-03 RX ADMIN — ACETAMINOPHEN 1000 MG: 500 TABLET, FILM COATED ORAL at 20:24

## 2025-01-03 RX ADMIN — HYDROMORPHONE HYDROCHLORIDE 4 MG: 4 TABLET ORAL at 07:38

## 2025-01-03 RX ADMIN — SENNOSIDES AND DOCUSATE SODIUM 2 TABLET: 50; 8.6 TABLET ORAL at 17:36

## 2025-01-03 RX ADMIN — OMEPRAZOLE 20 MG: 20 CAPSULE, DELAYED RELEASE ORAL at 05:01

## 2025-01-03 RX ADMIN — FOLIC ACID 1 MG: 1 TABLET ORAL at 05:01

## 2025-01-03 RX ADMIN — TRIAMCINOLONE ACETONIDE 40 MG: 40 SUSPENSION INTRA-ARTERIAL; INTRAMUSCULAR at 10:30

## 2025-01-03 RX ADMIN — ONDANSETRON 4 MG: 2 INJECTION INTRAMUSCULAR; INTRAVENOUS at 23:51

## 2025-01-03 RX ADMIN — BUPIVACAINE HYDROCHLORIDE: 2.5 INJECTION, SOLUTION EPIDURAL; INFILTRATION; INTRACAUDAL at 10:30

## 2025-01-03 RX ADMIN — Medication 100 MG: at 10:48

## 2025-01-03 RX ADMIN — HYDROMORPHONE HYDROCHLORIDE 4 MG: 4 TABLET ORAL at 14:28

## 2025-01-03 RX ADMIN — OXYCODONE HYDROCHLORIDE 10 MG: 10 TABLET ORAL at 23:50

## 2025-01-03 RX ADMIN — Medication 100 MG: at 05:01

## 2025-01-03 RX ADMIN — HYDROMORPHONE HYDROCHLORIDE 0.5 MG: 1 INJECTION, SOLUTION INTRAMUSCULAR; INTRAVENOUS; SUBCUTANEOUS at 05:00

## 2025-01-03 RX ADMIN — DULOXETINE HYDROCHLORIDE 60 MG: 30 CAPSULE, DELAYED RELEASE ORAL at 05:01

## 2025-01-03 RX ADMIN — OXYCODONE HYDROCHLORIDE 10 MG: 10 TABLET ORAL at 03:27

## 2025-01-03 RX ADMIN — ACETAMINOPHEN 1000 MG: 500 TABLET, FILM COATED ORAL at 14:27

## 2025-01-03 RX ADMIN — HYDROMORPHONE HYDROCHLORIDE 4 MG: 4 TABLET ORAL at 02:18

## 2025-01-03 RX ADMIN — SODIUM CHLORIDE, POTASSIUM CHLORIDE, SODIUM LACTATE AND CALCIUM CHLORIDE 250 ML: 600; 310; 30; 20 INJECTION, SOLUTION INTRAVENOUS at 22:56

## 2025-01-03 RX ADMIN — OXYCODONE HYDROCHLORIDE 10 MG: 10 TABLET ORAL at 20:22

## 2025-01-03 RX ADMIN — HYDROCORTISONE: 1 CREAM TOPICAL at 05:05

## 2025-01-03 RX ADMIN — SODIUM CHLORIDE, POTASSIUM CHLORIDE, SODIUM LACTATE AND CALCIUM CHLORIDE 250 ML: 600; 310; 30; 20 INJECTION, SOLUTION INTRAVENOUS at 23:37

## 2025-01-03 ASSESSMENT — COGNITIVE AND FUNCTIONAL STATUS - GENERAL
SUGGESTED CMS G CODE MODIFIER MOBILITY: CH
MOBILITY SCORE: 24
DAILY ACTIVITIY SCORE: 24
SUGGESTED CMS G CODE MODIFIER DAILY ACTIVITY: CH
SUGGESTED CMS G CODE MODIFIER DAILY ACTIVITY: CH
DAILY ACTIVITIY SCORE: 24

## 2025-01-03 ASSESSMENT — ENCOUNTER SYMPTOMS
WEIGHT LOSS: 1
NAUSEA: 1
SHORTNESS OF BREATH: 0
ABDOMINAL PAIN: 1
VOMITING: 0
FEVER: 0

## 2025-01-03 ASSESSMENT — PAIN DESCRIPTION - PAIN TYPE
TYPE: ACUTE PAIN

## 2025-01-03 NOTE — PROGRESS NOTES
Received report and assumed care of patient at change of shift. Patient is A&Ox4, on RA, and reports abdominal pain of 9/10 at this time, medicated per MAR. Patient educated that she is NPO after midnight, pt verbalized understanding. Patient assessment completed, bed in lowest position, and call light and personal belongings are within reach. Patient expressed no further needs at this time.

## 2025-01-03 NOTE — PROGRESS NOTES
[facility] Report given to transport [place].

## 2025-01-03 NOTE — OR NURSING
1214 patient arrived from OR, report received, attached to monitoring. VSS, patient oxygenating well on 4 L simple mask, s/p egd, ercp, eus, no surgical sites of note, patient asleep, breathing even/unlabored/clear lung sounds    1235 Called fiance with updates.     1246 Aroused briefly to voice. Fell back asleep easily.     1300 patient denies pain and nausea at this time    1305 patient tolerates sips of water    1315 report called to Stephanie CROWLEY    1319 Dr. Herrera at bedside    1342 patient back to room with transport tech, vss, denies pain and nausea, all belongings with patient and accounted for, safety ensured, care transferred

## 2025-01-03 NOTE — PROGRESS NOTES
Pt arrived back to unit from PACU, pt is A&Ox 4, pt reports pain when she coughs. Denies, nausea, lightheadedness, or dizziness at this time. Pt currently sitting up in chair at bedside while CNA changes linens on bed.

## 2025-01-03 NOTE — DIETARY
"Nutrition Services: Initial Assessment     Day 4 of admit. Brenda Cartagena is 39 y.o., female with admitting DX of Abdominal pain [R10.9]  Pancreatitis without necrosis or infection [K85.90].    Consult Received for: MST - Malnutrition Screening Tool and BMI    Current Hospital Problems List:    Principal Problem:    Abdominal pain (POA: Yes)  Active Problems:    Normocytic anemia (POA: Yes)    Pancreatic duct stones (POA: Yes)    IPMN (intraductal papillary mucinous neoplasm) (POA: Yes)    Pancreatitis without necrosis or infection (POA: Yes)  Resolved Problems:    * No resolved hospital problems. *    Nutrition Assessment:      Height: 160 cm (5' 2.99\")  Weight: 47.4 kg (104 lb 8 oz)  Weight taken via: Bed Scale  BMI Calculated: 18.52  BMI Classification: WNL   Weight Readings from Last 5 encounters:   Wt Readings from Last 5 Encounters:   01/02/25 47.4 kg (104 lb 8 oz)   12/30/24 48.7 kg (107 lb 5.8 oz)   12/21/24 47.3 kg (104 lb 4.4 oz)   11/13/24 53.7 kg (118 lb 6.2 oz)   06/09/24 52.7 kg (116 lb 2.9 oz)     Objective:   Pertinent Medical Hx: S/P ERCP, EUS today. Bile duct stent placement.  Pertinent Labs: 1/3: Alb 2.8 (L)  Pertinent Meds: Thiamine, Folic acid  Skin/Wounds:  No pressure injuries per chart review  Food Allergies: peanut, tree nuts  Last BM:     01/01/25 (pt stated; small minimal bm)     Current Diet Order/Intake:   NPO for procedure today.  Prior diet Regular with Ensure Plus supplements. Variable PO intake recorded from <25% to %.    Subjective:   Pt seen by RD on 12/20 and noted to have poor oral intake and severe weight loss.     Spoke with pt at bedside. She states she has continued to eat poorly due to inability to keep food down. She does now feel she can eat better. She states she will drink Ensure supplements.    Nutrition Focused Physical Exam (NFPE)  Weight Loss: Pt with 11.3% weight loss x 1 month from November to December. Weight currently stable from recent " admission.  Muscle Mass: Mild Wasting at temple  Subcutaneous Fat: Mild Loss at orbitals  Fluid Accumulation: None noted  Reduced  Strength: N/A in acute care setting.    Nutrition Diagnosis:      Inadequate Oral Intake related to pancreatitis as evidenced by reported poor PO intake with nausea and vomiting.      Severe Malnutrition in context of Acute Illness or Injury related to pancreatitis as evidenced by 11.3% weight loss x 1 month and reported poor oral intake <50% of needs for several weeks.  This is ongoing.    RD notified provider  Dr Venegas .       Nutrition Interventions:      Diet advancement per MD.  Recommend Ensure Plus High Protein (provides 350 calories) 20 g protein per 8 fl oz) TID once diet advanced.  Patient aware of active plan of care as appropriate.       Nutrition Monitoring and Evaluation:      Monitor nutrition POC, goal for >50% intake from meals and supplements.  Additional fluids per MD/DO  Monitor vital signs pertinent to nutrition.    RD following and will provide updated recommendations as indicated.      Audelia Nevarez R.D.                                         [facility] CRITERIA FOR MALNUTRITION

## 2025-01-03 NOTE — PROGRESS NOTES
Patient report received and assumed care. Assessed patient at 0730. Patient is Aox4 and reports 7/10 pain in abdomen. Patient scheduled for ERCP at 1025 today. Patient is continent of both and NPO. Patient is up self. Patient bed is in low, locked position with bed alarm off. Standard fall precautions are in place. Personal belonging and call light are within reach. Patient reinforced to use call light when needed.

## 2025-01-03 NOTE — PROGRESS NOTES
Hospital Medicine Daily Progress Note    Date of Service  1/3/2025    Chief Complaint  Brenda Cartagena is a 39 y.o. female admitted 12/30/2024 with abdominal pain     Hospital Course  Brenda Cartagena is a 39 y.o. female with a  past medical history of alcohol dependence and methamphetamine abuse last use was over 1 month ago who has had multiple admissions due to abdominal pain and pancreatitis.  In October she underwent EGD with gastritis and MRCP revealing pancreatic ductal dilation with stone and debris and a cystic lesion consistent with intraductal papillary mucinous neoplasm She subsequently underwent endoscopic ultrasound by Dr. Dawson gastroenterology revealing a stone lodged in the genu of the pancreas that was not able to be removed.  She stopped drinking and using methamphetamine and was evaluated by Dr. Byers hepatobiliary surgery.  She felt that her condition could be consistent with intraductal papillary mucinous neoplasm and recommended possible Puestow procedure to relieve the chronic pancreatitis but noted that she must have 6 months of complete sobriety from alcohol first.  She was discharged on 12/23/2024 with 5 days worth of oxycodone.  She ran out of the medications and in [place] there are no primary care providers taking new patients therefore presented to [facility] with a day and a half of severe abdominal pain and vomiting.  Lipase is normal labs are unremarkable. Hepatobiliary surgery was again consulted as was GI.  In the meantime she will be given IV fluids, pain control and her diet will be advanced if tolerated.    Interval Problem Update  Pt seen at bedside, still with abdominal pain, relatively unchanged. She is feeling hungry   - she is s/p ERCP with biopsy, stent insertion, calculus removal, sphincterotomy, bile duct dilation and celiac block   - tolerated CLD, advance to fulls this evening   - continue multimodal pain control   - vitals reviewed and stable  - labs  reviewed and stable     Possible discharge in the next 24 hours if she is able to tolerate diet and pain is controlled on oral medications       I have discussed this patient's plan of care and discharge plan at IDT rounds today with Case Management, Nursing, Nursing leadership, and other members of the IDT team.    Consultants/Specialty  GI and Hepatobiliary surgery    Code Status  Full Code    Disposition  The patient is not medically cleared for discharge to home or a post-acute facility.  Anticipate discharge to: home with close outpatient follow-up    I have placed the appropriate orders for post-discharge needs.    Review of Systems  Review of Systems   Constitutional:  Positive for weight loss. Negative for fever.   Respiratory:  Negative for shortness of breath.    Gastrointestinal:  Positive for abdominal pain and nausea. Negative for vomiting.        Physical Exam  Temp:  [36 °C (96.8 °F)-36.5 °C (97.7 °F)] 36 °C (96.8 °F)  Pulse:  [] 87  Resp:  [12-20] 14  BP: ()/(64-76) 101/66  SpO2:  [90 %-100 %] 96 %    Physical Exam  Vitals and nursing note reviewed.   Constitutional:       Appearance: She is ill-appearing.   HENT:      Head: Normocephalic and atraumatic.      Mouth/Throat:      Mouth: Mucous membranes are moist.   Eyes:      Conjunctiva/sclera: Conjunctivae normal.   Cardiovascular:      Rate and Rhythm: Tachycardia present.   Pulmonary:      Effort: Pulmonary effort is normal.   Abdominal:      General: There is no distension.      Palpations: Abdomen is soft.      Tenderness: There is abdominal tenderness. There is no guarding or rebound.   Musculoskeletal:         General: Normal range of motion.   Skin:     General: Skin is warm.   Neurological:      Mental Status: She is oriented to person, place, and time.   Psychiatric:         Mood and Affect: Mood normal.         Behavior: Behavior normal.         Fluids    Intake/Output Summary (Last 24 hours) at 1/3/2025 5608  Last data filed  at 1/3/2025 1201  Gross per 24 hour   Intake 600 ml   Output 0 ml   Net 600 ml        Laboratory  Recent Labs     01/01/25  0236 01/03/25  0204   WBC 9.3 6.0   RBC 3.55* 3.57*   HEMOGLOBIN 10.1* 10.1*   HEMATOCRIT 32.4* 32.1*   MCV 91.3 89.9   MCH 28.5 28.3   MCHC 31.2* 31.5*   RDW 49.1 48.4   PLATELETCT 381 331   MPV 10.9 10.4     Recent Labs     01/01/25  0236 01/02/25  0136 01/03/25  0204   SODIUM 135 138 137   POTASSIUM 3.9 4.2 3.7   CHLORIDE 99 102 102   CO2 24 26 25   GLUCOSE 105* 115* 102*   BUN 13 18 16   CREATININE 0.39* 0.43* 0.33*   CALCIUM 9.2 9.1 9.0     Recent Labs     01/02/25  1132   INR 1.07               Imaging  [id number] STENT-TUBE   Final Result      Digitized intraoperative radiograph is submitted for review. This examination is not for diagnostic purpose but for guidance during a surgical procedure. Please see the patient's chart for full procedural details.         INTERPRETING LOCATION: [address] [place] [address] [place], [address]      CT-PANCREAS AND ABDOMEN WITH & W/O   Final Result         1. Low-density lesion in the tail of the pancreas is stable along with pancreatic ductal dilatation.   2. The areas of soft tissue nodularity along the posterior aspect of the stomach now demonstrate central low density and peripheral enhancement. This may represent pseudocyst or abscess. Necrotic tumor and hematoma would be additional possible    differential considerations.   3. Hepatosplenomegaly.                 Assessment/Plan  * Abdominal pain- (present on admission)  Assessment & Plan  Complex situation for which she has undergone EGD multiple imaging studies and most consistent with intraductal papillary mucinous neoplasm with chronic pancreatic pain.   Diet as tolerated   I have adjusted analgesia today, added scheduled tylenol   s/p ERCP with biopsy, stent insertion, calculus removal, sphincterotomy, bile duct dilation and celiac block     Pancreatitis without necrosis or infection- (present on  admission)  Assessment & Plan  Chronic pancreatitis with associated IPMN  OP follow up with hepatobiliary surgery   [place] for consideration of Puestow procedure but needs 6 months of sobriety.    IPMN (intraductal papillary mucinous neoplasm)- (present on admission)  Assessment & Plan  She has been seen by Dr. Byers for consideration of Puestow procedure but needs 6 months of sobriety.  She has not drank in over 1 month.  hepatobiliary surgery has been reconsulted   Pancreatic CT ordered and reviewed  GI following, s/p ERCP with biopsy, stent insertion, calculus removal, sphincterotomy, bile duct dilation and celiac block     Pancreatic duct stones- (present on admission)  Assessment & Plan  Chronic condition  s/p ERCP with biopsy, stent insertion, calculus removal, sphincterotomy, bile duct dilation and celiac block today 1/3  Tolerated CLD, advance to fulls for dinner   Pain control     Normocytic anemia- (present on admission)  Assessment & Plan  Chronic, stable   No signs of bleeding, will trend labs          VTE prophylaxis:   SCDs/TEDs      I have performed a physical exam and reviewed and updated ROS and Plan today (1/3/2025). In review of yesterday's note (1/2/2025), there are no changes except as documented above.

## 2025-01-04 LAB
ALBUMIN SERPL BCP-MCNC: 3.1 G/DL (ref 3.2–4.9)
ALP SERPL-CCNC: 99 U/L (ref 30–99)
ALT SERPL-CCNC: 10 U/L (ref 2–50)
ANION GAP SERPL CALC-SCNC: 10 MMOL/L (ref 7–16)
AST SERPL-CCNC: 44 U/L (ref 12–45)
BASOPHILS # BLD AUTO: 0.1 % (ref 0–1.8)
BASOPHILS # BLD: 0.01 K/UL (ref 0–0.12)
BILIRUB CONJ SERPL-MCNC: <0.2 MG/DL (ref 0.1–0.5)
BILIRUB INDIRECT SERPL-MCNC: ABNORMAL MG/DL (ref 0–1)
BILIRUB SERPL-MCNC: 0.2 MG/DL (ref 0.1–1.5)
BUN SERPL-MCNC: 13 MG/DL (ref 8–22)
CALCIUM SERPL-MCNC: 8.8 MG/DL (ref 8.5–10.5)
CHLORIDE SERPL-SCNC: 98 MMOL/L (ref 96–112)
CO2 SERPL-SCNC: 23 MMOL/L (ref 20–33)
CREAT SERPL-MCNC: 0.34 MG/DL (ref 0.5–1.4)
EOSINOPHIL # BLD AUTO: 0 K/UL (ref 0–0.51)
EOSINOPHIL NFR BLD: 0 % (ref 0–6.9)
ERYTHROCYTE [DISTWIDTH] IN BLOOD BY AUTOMATED COUNT: 48.1 FL (ref 35.9–50)
GFR SERPLBLD CREATININE-BSD FMLA CKD-EPI: 132 ML/MIN/1.73 M 2
GLUCOSE SERPL-MCNC: 197 MG/DL (ref 65–99)
HCT VFR BLD AUTO: 31 % (ref 37–47)
HGB BLD-MCNC: 10 G/DL (ref 12–16)
IMM GRANULOCYTES # BLD AUTO: 0.03 K/UL (ref 0–0.11)
IMM GRANULOCYTES NFR BLD AUTO: 0.4 % (ref 0–0.9)
LYMPHOCYTES # BLD AUTO: 0.91 K/UL (ref 1–4.8)
LYMPHOCYTES NFR BLD: 12.2 % (ref 22–41)
MAGNESIUM SERPL-MCNC: 1.8 MG/DL (ref 1.5–2.5)
MCH RBC QN AUTO: 28.5 PG (ref 27–33)
MCHC RBC AUTO-ENTMCNC: 32.3 G/DL (ref 32.2–35.5)
MCV RBC AUTO: 88.3 FL (ref 81.4–97.8)
MONOCYTES # BLD AUTO: 0.21 K/UL (ref 0–0.85)
MONOCYTES NFR BLD AUTO: 2.8 % (ref 0–13.4)
NEUTROPHILS # BLD AUTO: 6.3 K/UL (ref 1.82–7.42)
NEUTROPHILS NFR BLD: 84.5 % (ref 44–72)
NRBC # BLD AUTO: 0 K/UL
NRBC BLD-RTO: 0 /100 WBC (ref 0–0.2)
PLATELET # BLD AUTO: 347 K/UL (ref 164–446)
PMV BLD AUTO: 11.2 FL (ref 9–12.9)
POTASSIUM SERPL-SCNC: 4.4 MMOL/L (ref 3.6–5.5)
PROT SERPL-MCNC: 7.3 G/DL (ref 6–8.2)
RBC # BLD AUTO: 3.51 M/UL (ref 4.2–5.4)
SODIUM SERPL-SCNC: 131 MMOL/L (ref 135–145)
WBC # BLD AUTO: 7.5 K/UL (ref 4.8–10.8)

## 2025-01-04 PROCEDURE — 36415 COLL VENOUS BLD VENIPUNCTURE: CPT

## 2025-01-04 PROCEDURE — 700111 HCHG RX REV CODE 636 W/ 250 OVERRIDE (IP): Mod: JZ | Performed by: STUDENT IN AN ORGANIZED HEALTH CARE EDUCATION/TRAINING PROGRAM

## 2025-01-04 PROCEDURE — 99233 SBSQ HOSP IP/OBS HIGH 50: CPT | Performed by: STUDENT IN AN ORGANIZED HEALTH CARE EDUCATION/TRAINING PROGRAM

## 2025-01-04 PROCEDURE — 80048 BASIC METABOLIC PNL TOTAL CA: CPT

## 2025-01-04 PROCEDURE — 700102 HCHG RX REV CODE 250 W/ 637 OVERRIDE(OP)

## 2025-01-04 PROCEDURE — 700102 HCHG RX REV CODE 250 W/ 637 OVERRIDE(OP): Performed by: STUDENT IN AN ORGANIZED HEALTH CARE EDUCATION/TRAINING PROGRAM

## 2025-01-04 PROCEDURE — A9270 NON-COVERED ITEM OR SERVICE: HCPCS

## 2025-01-04 PROCEDURE — 770006 HCHG ROOM/CARE - MED/SURG/GYN SEMI*

## 2025-01-04 PROCEDURE — 700102 HCHG RX REV CODE 250 W/ 637 OVERRIDE(OP): Performed by: HOSPITALIST

## 2025-01-04 PROCEDURE — 700111 HCHG RX REV CODE 636 W/ 250 OVERRIDE (IP): Performed by: HOSPITALIST

## 2025-01-04 PROCEDURE — A9270 NON-COVERED ITEM OR SERVICE: HCPCS | Performed by: HOSPITALIST

## 2025-01-04 PROCEDURE — A9270 NON-COVERED ITEM OR SERVICE: HCPCS | Performed by: STUDENT IN AN ORGANIZED HEALTH CARE EDUCATION/TRAINING PROGRAM

## 2025-01-04 PROCEDURE — 99232 SBSQ HOSP IP/OBS MODERATE 35: CPT | Performed by: NURSE PRACTITIONER

## 2025-01-04 PROCEDURE — 85025 COMPLETE CBC W/AUTO DIFF WBC: CPT

## 2025-01-04 PROCEDURE — 700105 HCHG RX REV CODE 258

## 2025-01-04 PROCEDURE — 99232 SBSQ HOSP IP/OBS MODERATE 35: CPT | Performed by: SURGERY

## 2025-01-04 PROCEDURE — 80076 HEPATIC FUNCTION PANEL: CPT

## 2025-01-04 PROCEDURE — 83735 ASSAY OF MAGNESIUM: CPT

## 2025-01-04 RX ORDER — HYDROXYZINE HYDROCHLORIDE 50 MG/1
25 TABLET, FILM COATED ORAL ONCE
Status: COMPLETED | OUTPATIENT
Start: 2025-01-04 | End: 2025-01-04

## 2025-01-04 RX ORDER — HYDROMORPHONE HYDROCHLORIDE 2 MG/1
2 TABLET ORAL EVERY 4 HOURS PRN
Status: DISCONTINUED | OUTPATIENT
Start: 2025-01-04 | End: 2025-01-05 | Stop reason: HOSPADM

## 2025-01-04 RX ORDER — ENOXAPARIN SODIUM 100 MG/ML
40 INJECTION SUBCUTANEOUS DAILY
Status: DISCONTINUED | OUTPATIENT
Start: 2025-01-04 | End: 2025-01-05 | Stop reason: HOSPADM

## 2025-01-04 RX ADMIN — OMEPRAZOLE 20 MG: 20 CAPSULE, DELAYED RELEASE ORAL at 06:12

## 2025-01-04 RX ADMIN — ACETAMINOPHEN 1000 MG: 500 TABLET, FILM COATED ORAL at 09:35

## 2025-01-04 RX ADMIN — HYDROMORPHONE HYDROCHLORIDE 2 MG: 2 TABLET ORAL at 15:18

## 2025-01-04 RX ADMIN — ACETAMINOPHEN 1000 MG: 500 TABLET, FILM COATED ORAL at 15:18

## 2025-01-04 RX ADMIN — HYDROMORPHONE HYDROCHLORIDE 2 MG: 2 TABLET ORAL at 22:50

## 2025-01-04 RX ADMIN — HYDROXYZINE HYDROCHLORIDE 25 MG: 50 TABLET, FILM COATED ORAL at 02:07

## 2025-01-04 RX ADMIN — HYDROCORTISONE: 1 CREAM TOPICAL at 06:16

## 2025-01-04 RX ADMIN — HYDROMORPHONE HYDROCHLORIDE 2 MG: 2 TABLET ORAL at 10:27

## 2025-01-04 RX ADMIN — OXYCODONE HYDROCHLORIDE 10 MG: 10 TABLET ORAL at 03:19

## 2025-01-04 RX ADMIN — SODIUM CHLORIDE, POTASSIUM CHLORIDE, SODIUM LACTATE AND CALCIUM CHLORIDE: 600; 310; 30; 20 INJECTION, SOLUTION INTRAVENOUS at 00:11

## 2025-01-04 RX ADMIN — DULOXETINE HYDROCHLORIDE 60 MG: 30 CAPSULE, DELAYED RELEASE ORAL at 06:11

## 2025-01-04 RX ADMIN — Medication 100 MG: at 06:12

## 2025-01-04 RX ADMIN — ONDANSETRON 4 MG: 4 TABLET, ORALLY DISINTEGRATING ORAL at 22:55

## 2025-01-04 RX ADMIN — FOLIC ACID 1 MG: 1 TABLET ORAL at 06:12

## 2025-01-04 RX ADMIN — SODIUM CHLORIDE, POTASSIUM CHLORIDE, SODIUM LACTATE AND CALCIUM CHLORIDE: 600; 310; 30; 20 INJECTION, SOLUTION INTRAVENOUS at 06:17

## 2025-01-04 RX ADMIN — HYDROMORPHONE HYDROCHLORIDE 4 MG: 4 TABLET ORAL at 00:53

## 2025-01-04 RX ADMIN — ENOXAPARIN SODIUM 40 MG: 100 INJECTION SUBCUTANEOUS at 17:18

## 2025-01-04 ASSESSMENT — ENCOUNTER SYMPTOMS
HEMOPTYSIS: 0
SPEECH CHANGE: 0
WEAKNESS: 0
WEIGHT LOSS: 0
VOMITING: 0
ORTHOPNEA: 0
NAUSEA: 0
SPUTUM PRODUCTION: 0
FOCAL WEAKNESS: 0
TREMORS: 0
PHOTOPHOBIA: 0
HALLUCINATIONS: 0
CLAUDICATION: 0
BRUISES/BLEEDS EASILY: 0
COUGH: 0
NECK PAIN: 0
DIARRHEA: 0
DIZZINESS: 0
HEADACHES: 0
SHORTNESS OF BREATH: 0
DEPRESSION: 0
NAUSEA: 1
MYALGIAS: 0
DOUBLE VISION: 0
BACK PAIN: 0
CHILLS: 0
SENSORY CHANGE: 0
WEIGHT LOSS: 1
ABDOMINAL PAIN: 1
VOMITING: 1
PALPITATIONS: 0
EYE PAIN: 0
FEVER: 0
CONSTIPATION: 0
TINGLING: 0
EYE DISCHARGE: 0
BLURRED VISION: 0
BLOOD IN STOOL: 0
NERVOUS/ANXIOUS: 0
HEARTBURN: 0

## 2025-01-04 ASSESSMENT — PAIN DESCRIPTION - PAIN TYPE
TYPE: ACUTE PAIN

## 2025-01-04 ASSESSMENT — PATIENT HEALTH QUESTIONNAIRE - PHQ9
SUM OF ALL RESPONSES TO PHQ9 QUESTIONS 1 AND 2: 0
SUM OF ALL RESPONSES TO PHQ9 QUESTIONS 1 AND 2: 0
1. LITTLE INTEREST OR PLEASURE IN DOING THINGS: NOT AT ALL
2. FEELING DOWN, DEPRESSED, IRRITABLE, OR HOPELESS: NOT AT ALL
2. FEELING DOWN, DEPRESSED, IRRITABLE, OR HOPELESS: NOT AT ALL
1. LITTLE INTEREST OR PLEASURE IN DOING THINGS: NOT AT ALL

## 2025-01-04 ASSESSMENT — LIFESTYLE VARIABLES: SUBSTANCE_ABUSE: 0

## 2025-01-04 NOTE — PROGRESS NOTES
Patient at 1646 was hard to arouse from sleep. Patient responded to sternal rube and voice. Patient delayed to wake up. Patient responded and is Aox4. Patient vitals taken, /66, respiration 15, HR 80, and SPO2 97 on room air. Patient O2 decreased  to 90% when sleeping, Patient placed on  and put on 1 L while sleeping.

## 2025-01-04 NOTE — PROGRESS NOTES
..Gastroenterology Progress Note               Author:  Lala Quick, MARYANA,  APRN Date & Time Created: 1/4/2025 8:02 AM       Patient ID:  Name:             Brenda Cartagena  YOB: 1985  Age:                 39 y.o.  female  MRN:               [id number]    Medical Decision Making, by Problem:  Active Hospital Problems    Diagnosis     Pancreatitis without necrosis or infection [K85.90]     Pancreatic duct stones [K86.89]     IPMN (intraductal papillary mucinous neoplasm) [D49.0]     Abdominal pain [R10.9]     Normocytic anemia [D64.9]      Presenting Chief Complaint:  Intractable abdominal pain      History of Present Illness:    This is a 39 y.o. female transferred from [facility] [place] by [facility] for a pancreatic duct stone with abdominal pain, nausea and vomiting.       She was recently admitted to [facility] in November for pancreatic mass versus pseudocyst and underwent MRI which did confirm pancreatic ductal dilation with stones and debris, cystic lesion with internal debris and/or stones in the distal pancreatic body 15mm, IPMN favored.  Seen by [facility] and on 11/12/24 and underwent EGD and was found to have mild erosive esophagitis and gastric erosions negative for H pylori.  She underwent EUS about 4 weeks ago at [place] with Dr. Dawson:   pancreatic duct stone 4mm in a dilated portion of the PD in the genu of the pancreas that may be an early main duct IPMN but the downstream portion is too small to remove stone and therefore cannot be done endoscopically and Puestow recommended.   She was referred to Surgical Oncology for stone removal.  Surgery could not be scheduled for a few months and [facility] [place] operated by [facility] contacted Dr. Byers, Hepatobiliary surgery.  Patient seen by us and we consulted Hepatobiliary Surgery.  Their recommendation was 6 months sobriety with recommendation for Pain Management and follow up with Dr. Dawson for pancreatic stent placement. Discharged  home 12/23/24.     Presented 12/30/2024  to [facility] with intractable pain but they were not willing to have her evaluated by Dr. Dawson or his partners of [facility] for pancreatic stent and so she was sent to [facility].  Dr. Stovall discussed case with Dr. Esqueda who discussed case with Dr. Herrera for attempt at pancreatic stent.  Also discussed possibility of celiac plexus block.     Patient has been alcohol free x 1 month but difficulty with pain control.  Has lost 8 pounds this past month.      1/3/2025:  EGD/EUS/ERCP  Pancreatic duct stones, incompletely treated:   -Treated with pancreatic sphincterotomy and balloon septoplasty with placement of 4 [place] by 11 cm single pigtail plastic pancreatic duct stent.  Some stone fragments could be retrieved with balloon sweep.  Several stones remained.  Biliary sphincterotomy pursued.  Cholangiogram normal.  Cystic duct and gallbladder opacified.  Celiac plexus block with triamcinolone and bupivacaine under EUS guidance.    Interval History:  12/31/2024: Patient seen, very lethargic post pain medication administration. Able to tell me she is still having abdominal pain. Last BM 12/28, minimal appetite.     1/2/2025: Patient seen. Still having post prandial abdominal pain, eating 1-2 bites of food in a day. Able to tolerate Ensure and milk. No BM since 12/28. INR added to am labs    1/4/2025: Asymptomatic hypotension overnight, s/p fluid bolus with BP this am 98/64. Liver enzymes normal. Still having some abdominal pain but eating a bit more.     Hospital Medications:  Current Facility-Administered Medications   Medication Dose Frequency Provider Last Rate Last Admin    HYDROmorphone (Dilaudid) tablet 2 mg  2 mg Q4HRS PRN Maryanne Venegas M.D.        lactated ringers infusion   Continuous TREVOR VasquezN.P. 75 mL/hr at 01/04/25 0617 New Bag at 01/04/25 0617    oxyCODONE immediate release (Roxicodone) tablet 10 mg  10 mg Q3HRS PRN TREVOR VasquezN.P.   10 mg at  01/04/25 0319    hydrocortisone 1 % cream   BID Maryanne Venegas M.D.   Given at 01/04/25 0616    acetaminophen (Tylenol) tablet 1,000 mg  1,000 mg TID Maryanne Venegas M.D.   1,000 mg at 01/03/25 2024    folic acid (Folvite) tablet 1 mg  1 mg DAILY Maryanne Venegas M.D.   1 mg at 01/04/25 0612    omeprazole (PriLOSEC) capsule 20 mg  20 mg DAILY Maryanne Venegas M.D.   20 mg at 01/04/25 0612    thiamine (Vitamin B-1) tablet 100 mg  100 mg DAILY Maryanne Venegas M.D.   100 mg at 01/04/25 0612    DULoxetine (Cymbalta) capsule 60 mg  60 mg DAILY Esteban Stovall M.D.   60 mg at 01/04/25 0611    senna-docusate (Pericolace Or Senokot S) 8.6-50 MG per tablet 2 Tablet  2 Tablet Q EVENING Esteban Stovall M.D.   2 Tablet at 01/03/25 1736    And    polyethylene glycol/lytes (Miralax) Packet 1 Packet  1 Packet QDAY PRN Esteban Stovall M.D.        ondansetron (Zofran) syringe/vial injection 4 mg  4 mg Q4HRS PRN Esteban Stovall M.D.   4 mg at 01/03/25 2351    ondansetron (Zofran ODT) dispertab 4 mg  4 mg Q4HRS PRN Esteban Stovall M.D.   4 mg at 01/02/25 1622    promethazine (Phenergan) tablet 12.5-25 mg  12.5-25 mg Q4HRS PRN Esteban Stovall M.D.        promethazine (Phenergan) suppository 12.5-25 mg  12.5-25 mg Q4HRS PRN Esteban Stovall M.D.        prochlorperazine (Compazine) injection 5-10 mg  5-10 mg Q4HRS PRN Esteban Stvoall M.D.       Last reviewed on 12/30/2024  8:03 AM by Vanesa Tsai       Review of Systems:  Review of Systems   Constitutional:  Positive for weight loss. Negative for chills, fever and malaise/fatigue.   HENT:  Negative for hearing loss.    Eyes:  Negative for blurred vision.   Respiratory:  Negative for cough and shortness of breath.    Cardiovascular:  Negative for chest pain and leg swelling.   Gastrointestinal:  Positive for abdominal pain. Negative for blood in stool, constipation, diarrhea, heartburn, melena, nausea and vomiting.   Genitourinary:  Negative for dysuria.   Musculoskeletal:   "Negative for back pain.   Skin:  Negative for rash.   Neurological:  Negative for dizziness and weakness.   Psychiatric/Behavioral:  Negative for depression.    All other systems reviewed and are negative.        Vital signs:  Weight/BMI: Body mass index is 18.52 kg/m².  BP 98/64   Pulse 83   Temp 36.7 °C (98 °F) (Temporal)   Resp 16   Ht 1.6 m (5' 2.99\")   Wt 47.4 kg (104 lb 8 oz)   SpO2 94%   Vitals:    01/04/25 0200 01/04/25 0601 01/04/25 0621 01/04/25 0733   BP: 94/62 (!) 89/54 95/67 98/64   Pulse: 71 77  83   Resp: 16 14  16   Temp: 36.6 °C (97.9 °F) 36.6 °C (97.9 °F)  36.7 °C (98 °F)   TempSrc: Temporal Temporal  Temporal   SpO2: 98% 94%  94%   Weight:       Height:         Oxygen Therapy:  Pulse Oximetry: 94 %, O2 (LPM): 0, O2 Delivery Device: None - Room Air    Intake/Output Summary (Last 24 hours) at 1/4/2025 0802  Last data filed at 1/3/2025 2100  Gross per 24 hour   Intake 1205 ml   Output 0 ml   Net 1205 ml         Physical Exam  Vitals and nursing note reviewed.   Constitutional:       General: She is not in acute distress.     Appearance: Normal appearance. She is ill-appearing.   HENT:      Head: Normocephalic and atraumatic.      Right Ear: External ear normal.      Left Ear: External ear normal.      Nose: Nose normal.      Mouth/Throat:      Mouth: Mucous membranes are moist.      Pharynx: Oropharynx is clear.   Eyes:      General: No scleral icterus.  Cardiovascular:      Rate and Rhythm: Normal rate and regular rhythm.      Pulses: Normal pulses.      Heart sounds: Normal heart sounds.   Pulmonary:      Effort: Pulmonary effort is normal. No respiratory distress.      Breath sounds: Normal breath sounds.   Abdominal:      General: Abdomen is flat. Bowel sounds are normal. There is no distension.      Palpations: Abdomen is soft.      Tenderness: There is abdominal tenderness.   Musculoskeletal:         General: Normal range of motion.      Cervical back: Normal range of motion.   Skin:     " General: Skin is warm and dry.      Capillary Refill: Capillary refill takes less than 2 seconds.      Coloration: Skin is pale.   Neurological:      Mental Status: She is alert and oriented to person, place, and time.      Motor: No weakness.   Psychiatric:         Mood and Affect: Mood normal.         Behavior: Behavior normal.         Labs:  Recent Labs     01/02/25 0136 01/03/25 0204 01/04/25 0036   SODIUM 138 137 131*   POTASSIUM 4.2 3.7 4.4   CHLORIDE 102 102 98   CO2 26 25 23   BUN 18 16 13   CREATININE 0.43* 0.33* 0.34*   MAGNESIUM  --   --  1.8   CALCIUM 9.1 9.0 8.8     Recent Labs     01/02/25 0136 01/03/25 0204 01/04/25 0036   ALTSGPT 7 9  --    ASTSGOT 35 34  --    ALKPHOSPHAT 96 87  --    TBILIRUBIN 0.3 0.2  --    GLUCOSE 115* 102* 197*     Recent Labs     01/02/25 0136 01/03/25 0204 01/04/25 0036   WBC  --  6.0 7.5   NEUTSPOLYS  --  63.60 84.50*   LYMPHOCYTES  --  24.80 12.20*   MONOCYTES  --  8.10 2.80   EOSINOPHILS  --  2.70 0.00   BASOPHILS  --  0.30 0.10   ASTSGOT 35 34  --    ALTSGPT 7 9  --    ALKPHOSPHAT 96 87  --    TBILIRUBIN 0.3 0.2  --      Recent Labs     01/02/25  1132 01/03/25 0204 01/04/25 0036   RBC  --  3.57* 3.51*   HEMOGLOBIN  --  10.1* 10.0*   HEMATOCRIT  --  32.1* 31.0*   PLATELETCT  --  331 347   PROTHROMBTM 14.0  --   --    INR 1.07  --   --      Recent Results (from the past 24 hours)   Basic Metabolic Panel    Collection Time: 01/04/25 12:36 AM   Result Value Ref Range    Sodium 131 (L) 135 - 145 mmol/L    Potassium 4.4 3.6 - 5.5 mmol/L    Chloride 98 96 - 112 mmol/L    Co2 23 20 - 33 mmol/L    Glucose 197 (H) 65 - 99 mg/dL    Bun 13 8 - 22 mg/dL    Creatinine 0.34 (L) 0.50 - 1.40 mg/dL    Calcium 8.8 8.5 - 10.5 mg/dL    Anion Gap 10.0 7.0 - 16.0   CBC WITH DIFFERENTIAL    Collection Time: 01/04/25 12:36 AM   Result Value Ref Range    WBC 7.5 4.8 - 10.8 K/uL    RBC 3.51 (L) 4.20 - 5.40 M/uL    Hemoglobin 10.0 (L) 12.0 - 16.0 g/dL    Hematocrit 31.0 (L) 37.0 - 47.0 %     MCV 88.3 81.4 - 97.8 fL    MCH 28.5 27.0 - 33.0 pg    MCHC 32.3 32.2 - 35.5 g/dL    RDW 48.1 35.9 - 50.0 fL    Platelet Count 347 164 - 446 K/uL    MPV 11.2 9.0 - 12.9 fL    Neutrophils-Polys 84.50 (H) 44.00 - 72.00 %    Lymphocytes 12.20 (L) 22.00 - 41.00 %    Monocytes 2.80 0.00 - 13.40 %    Eosinophils 0.00 0.00 - 6.90 %    Basophils 0.10 0.00 - 1.80 %    Immature Granulocytes 0.40 0.00 - 0.90 %    Nucleated RBC 0.00 0.00 - 0.20 /100 WBC    Neutrophils (Absolute) 6.30 1.82 - 7.42 K/uL    Lymphs (Absolute) 0.91 (L) 1.00 - 4.80 K/uL    Monos (Absolute) 0.21 0.00 - 0.85 K/uL    Eos (Absolute) 0.00 0.00 - 0.51 K/uL    Baso (Absolute) 0.01 0.00 - 0.12 K/uL    Immature Granulocytes (abs) 0.03 0.00 - 0.11 K/uL    NRBC (Absolute) 0.00 K/uL   MAGNESIUM    Collection Time: 01/04/25 12:36 AM   Result Value Ref Range    Magnesium 1.8 1.5 - 2.5 mg/dL   ESTIMATED GFR    Collection Time: 01/04/25 12:36 AM   Result Value Ref Range    GFR (CKD-EPI) 132 >60 mL/min/1.73 m 2       Radiology Review:  [id number] STENT-TUBE   Final Result      Digitized intraoperative radiograph is submitted for review. This examination is not for diagnostic purpose but for guidance during a surgical procedure. Please see the patient's chart for full procedural details.         INTERPRETING LOCATION: [address] [place] [address], [address]      CT-PANCREAS AND ABDOMEN WITH & W/O   Final Result         1. Low-density lesion in the tail of the pancreas is stable along with pancreatic ductal dilatation.   2. The areas of soft tissue nodularity along the posterior aspect of the stomach now demonstrate central low density and peripheral enhancement. This may represent pseudocyst or abscess. Necrotic tumor and hematoma would be additional possible    differential considerations.   3. Hepatosplenomegaly.              MDM (Data Review):   -Records reviewed and summarized in current documentation  -I personally reviewed and interpreted the laboratory  results  -I personally reviewed the radiology images    Assessment/Recommendations:  Intractable abdominal pain, suspect pancreatic  --Pancreatic duct stone, unable to be removed endoscopically  --Possible small, main duct, IPMN  --AUD, 1 month sobriety and needs 6 months before consideration for pancreatic surgery.  She understands this.  --Anemia  --History of substance abuse     Recommendations:  Diet as tolerated  Follow-up with hepatobiliary surgery- decisions regarding stent management deferred to surgery discretion.  Defer further interventional endoscopic interventions to Dr. Dawson.  Emphasized importance of maintaining sobriety, patient verbalized understanding  Advised her that oral pain medications will be tapered as her celiac plexus block is working to alleviate abdominal pain    GI team will sign off. Please reconsult as needed.     Plan discussed with patient,  Dr. Venegas and Dr. Herrera    ..Lala Quick, MARYANA,  APRN    Core Quality Measures   Reviewed items::  Labs, Medications and Radiology reports reviewed      Addendum: pt has large peripancreatic fluid collection. Discussed with [facility] Surgery. Anticipate EUS guided cystogastrostomy with lumen apposing stent on friday

## 2025-01-04 NOTE — PROGRESS NOTES
Patient report received and assumed care. Assessed patient at 0715. Patient is Aox4 and reports 7/10 pain. Patient is on room air. Patient BP 98/64. Patient is on full liquid diet. Patient is SBA to bathroom. Patient is low fall risk. Patient bed is in low, locked position with bed alarm off. Standard fall precautions are in place. Personal belonging and call light are within reach. Patient reinforced to use call light when needed.

## 2025-01-04 NOTE — PROGRESS NOTES
Date of Service  January 4, 2025     Chief Complaint:   Abdominal pain, chronic pancreatitis    Surgery Completed  EGD, panc stent, celiac block     Hospital Course  POD # 1     Interval Problem Update  Feels better today  Some pain but improved  Has more of an appetite    Problem List  Principal Problem:    Abdominal pain (POA: Yes)  Active Problems:    Normocytic anemia (POA: Yes)    Pancreatic duct stones (POA: Yes)    IPMN (intraductal papillary mucinous neoplasm) (POA: Yes)    Pancreatitis without necrosis or infection (POA: Yes)  Resolved Problems:    * No resolved hospital problems. *       Subjective  Review of Systems   Constitutional:  Positive for malaise/fatigue. Negative for chills, fever and weight loss.   HENT:  Negative for congestion, ear discharge, ear pain, hearing loss and nosebleeds.    Eyes:  Negative for blurred vision, double vision, photophobia, pain and discharge.   Respiratory:  Negative for cough, hemoptysis and sputum production.    Cardiovascular:  Negative for chest pain, palpitations, orthopnea and claudication.   Gastrointestinal:  Positive for abdominal pain, nausea and vomiting. Negative for constipation, diarrhea and heartburn.   Genitourinary:  Negative for dysuria, frequency, hematuria and urgency.   Musculoskeletal:  Negative for back pain, joint pain, myalgias and neck pain.   Skin:  Negative for itching and rash.   Neurological:  Negative for dizziness, tingling, tremors, sensory change, speech change, focal weakness and headaches.   Endo/Heme/Allergies:  Negative for environmental allergies. Does not bruise/bleed easily.   Psychiatric/Behavioral:  Negative for depression, hallucinations, substance abuse and suicidal ideas. The patient is not nervous/anxious.          Objective  Temp:  [36 °C (96.8 °F)-36.8 °C (98.2 °F)] 36.7 °C (98 °F)  Pulse:  [65-94] 83  Resp:  [12-20] 15  BP: ()/(54-88) 112/74  SpO2:  [94 %-100 %] 96 %      Physical Exam  Constitutional:        General: She is not in acute distress.     Appearance: Normal appearance. She is not ill-appearing.   HENT:      Head: Normocephalic.   Eyes:      Conjunctiva/sclera: Conjunctivae normal.      Pupils: Pupils are equal, round, and reactive to light.   Cardiovascular:      Rate and Rhythm: Normal rate and regular rhythm.   Pulmonary:      Effort: Pulmonary effort is normal. No respiratory distress.   Abdominal:      General: There is no distension.      Palpations: Abdomen is soft.      Tenderness: There is no abdominal tenderness.   Musculoskeletal:         General: No swelling or deformity.      Cervical back: Neck supple.   Neurological:      Mental Status: She is alert.          Fluids    Intake/Output Summary (Last 24 hours) at 1/4/2025 1318  Last data filed at 1/4/2025 1126  Gross per 24 hour   Intake 770 ml   Output --   Net 770 ml         Labs  Lab Results   Component Value Date/Time    SODIUM 131 (L) 01/04/2025 12:36 AM    POTASSIUM 4.4 01/04/2025 12:36 AM    CHLORIDE 98 01/04/2025 12:36 AM    CO2 23 01/04/2025 12:36 AM    GLUCOSE 197 (H) 01/04/2025 12:36 AM    BUN 13 01/04/2025 12:36 AM    CREATININE 0.34 (L) 01/04/2025 12:36 AM         Lab Results   Component Value Date/Time    PROTHROMBTM 14.0 01/02/2025 11:32 AM    INR 1.07 01/02/2025 11:32 AM         Lab Results   Component Value Date/Time    WBC 7.5 01/04/2025 12:36 AM    RBC 3.51 (L) 01/04/2025 12:36 AM    HEMOGLOBIN 10.0 (L) 01/04/2025 12:36 AM    HEMATOCRIT 31.0 (L) 01/04/2025 12:36 AM    MCV 88.3 01/04/2025 12:36 AM    MCH 28.5 01/04/2025 12:36 AM    MCHC 32.3 01/04/2025 12:36 AM    MPV 11.2 01/04/2025 12:36 AM    NEUTSPOLYS 84.50 (H) 01/04/2025 12:36 AM    LYMPHOCYTES 12.20 (L) 01/04/2025 12:36 AM    MONOCYTES 2.80 01/04/2025 12:36 AM    EOSINOPHILS 0.00 01/04/2025 12:36 AM    BASOPHILS 0.10 01/04/2025 12:36 AM         Recent Labs     12/30/24  0220 01/01/25  0236 01/02/25  0136 01/02/25  1132 01/03/25  0204 01/04/25  0036   ASTOT 25 33 35  --   34 44   ALTSGPT 11 9 7  --  9 10   TBILIRUBIN 0.4 0.4 0.3  --  0.2 0.2   IBILIRUBIN  --   --   --   --   --  see below   DBILIRUBIN  --   --   --   --   --  <0.2   GLOBULIN 3.9* 4.2* 4.1*  --  4.3*  --    INR  --   --   --  1.07  --   --           Patient Active Problem List   Diagnosis    Tibia/fibula fracture    Lumbar transverse process fracture (HCC)    Nonunion of fracture    Fracture, nonunion    Fractured tibia    Alcohol withdrawal (HCC)    Alcohol-induced acute pancreatitis without infection or necrosis    Peritonsillar abscess    Thrombocytopenia (HCC)    Gallbladder sludge    Uncomplicated alcohol dependence (HCC)    Intractable abdominal pain    Methamphetamine abuse (HCC)    Pancytopenia (HCC)    Abdominal pain    Alcohol dependence (HCC)    Hyponatremia    Normocytic anemia    Leukopenia    CT imaging are concerning for possible pancreatic mass versus pseudocyst    Hyperglycemia    Hypomagnesemia    Cystic mass of pancreas    Gastric lesion    Anxiety    Eczema    Alcohol-induced chronic pancreatitis (HCC)    Pancreatic duct stones    IPMN (intraductal papillary mucinous neoplasm)    Constipation due to opioid therapy    Severe protein-calorie malnutrition (HCC)    Hallucinations    Chronic narcotic dependence (HCC)    Pancreatitis without necrosis or infection        Assessment/Plan   39F with acute on chronic pancreatitis, panc stone, dilated pancreatic duct.  Presented with ongoing pain and decreased PO intake.  Suspect was likely related to her chronic pancreatitis    Underwent EUS, celiac block, pancreatic stent.  Feels a little better today    -Would advance diet as tolerated  -Continue pain meds can ween as tolerates  -Will need to follow up in clinic with Dr. Byers after discharge to discuss possible Puestow procedure  -Surgery will follow peripherally, please call with questions.       Holden Esqueda M.D.

## 2025-01-04 NOTE — ASSESSMENT & PLAN NOTE
Hypotension overnight suspect secondary to poor intake as well as narcotic medications  Patient given 1 L of IV fluids and started on a continuous IV fluid rate at 75 cc an hour with improvement in her blood pressure  I have decreased her Dilaudid from 4 to 2 mg  Encourage oral hydration and intake today  We will hold on further IV fluids she will need to be off prior to discharge

## 2025-01-04 NOTE — PROGRESS NOTES
NOC HOSPITALIST CROSS COVER    Notified by RN regarding asymptomatic hypotension of 85/54. The patient is s/p ERCP with stent placement by Dr Herrera today.     Vitals:    01/04/25 0200   BP: 94/62   Pulse: 71   Resp: 16   Temp: 36.6 °C (97.9 °F)   SpO2: 98%      Plan:  #Hypotension  -500 cc LR bolus  -Start gentle IV hydration at 75 mL/hr  -Monitor closely for signs of fluid overload    -----------------------------------------------------------------------------------------------------------    Electronically signed by:  Lazaro Robertson, DNP, APRN, AGAJOSE MIGUELP-BC  Hospitalist Services

## 2025-01-04 NOTE — PROGRESS NOTES
Hospital Medicine Daily Progress Note    Date of Service  1/4/2025    Chief Complaint  Brenda Cartagena is a 39 y.o. female admitted 12/30/2024 with abdominal pain     Hospital Course  Brenda Cartagena is a 39 y.o. female with a  past medical history of alcohol dependence and methamphetamine abuse last use was over 1 month ago who has had multiple admissions due to abdominal pain and pancreatitis.  In October she underwent EGD with gastritis and MRCP revealing pancreatic ductal dilation with stone and debris and a cystic lesion consistent with intraductal papillary mucinous neoplasm She subsequently underwent endoscopic ultrasound by Dr. Dawson gastroenterology revealing a stone lodged in the genu of the pancreas that was not able to be removed.  She stopped drinking and using methamphetamine and was evaluated by Dr. Byers hepatobiliary surgery.  She felt that her condition could be consistent with intraductal papillary mucinous neoplasm and recommended possible Puestow procedure to relieve the chronic pancreatitis but noted that she must have 6 months of complete sobriety from alcohol first.  She was discharged on 12/23/2024 with 5 days worth of oxycodone.  She ran out of the medications and in [place] there are no primary care providers taking new patients therefore [facility] with a day and a half of severe abdominal pain and vomiting.  Lipase is normal labs are unremarkable. Hepatobiliary surgery was again consulted as was GI.  In the meantime she will be given IV fluids, pain control and her diet will be advanced if tolerated.    Interval Problem Update  Pt seen at bedside, still with abdominal pain, relatively unchanged. Still with poor intake but reports more appetite. Discussed importance of nutrition, advance to GI soft diet today   - continue multimodal pain control   - hypotensive overnight, required bolus and continuous fluids. Her BP is improved this AM. Decreased dilaudid  dosing. Hold further fluids to ensure she can maintain adequate BP    If pain stable, BP stable and tolerating intake, anticipate dc home tomorrow with close surgery follow up     I have discussed this patient's plan of care and discharge plan at IDT rounds today with Case Management, Nursing, Nursing leadership, and other members of the IDT team.    Consultants/Specialty  GI and Hepatobiliary surgery    Code Status  Full Code    Disposition  The patient is not medically cleared for discharge to home or a post-acute facility.  Anticipate discharge to: home with close outpatient follow-up    I have placed the appropriate orders for post-discharge needs.    Review of Systems  Review of Systems   Constitutional:  Positive for weight loss. Negative for fever.   Respiratory:  Negative for shortness of breath.    Gastrointestinal:  Positive for abdominal pain and nausea. Negative for vomiting.        Physical Exam  Temp:  [36 °C (96.8 °F)-36.8 °C (98.2 °F)] 36.7 °C (98 °F)  Pulse:  [67-88] 83  Resp:  [12-16] 15  BP: ()/(54-88) 112/74  SpO2:  [94 %-100 %] 96 %    Physical Exam  Vitals and nursing note reviewed.   Constitutional:       Appearance: She is ill-appearing.   HENT:      Head: Normocephalic and atraumatic.      Mouth/Throat:      Mouth: Mucous membranes are moist.   Eyes:      Conjunctiva/sclera: Conjunctivae normal.   Cardiovascular:      Rate and Rhythm: Normal rate.   Pulmonary:      Effort: Pulmonary effort is normal.   Abdominal:      General: There is no distension.      Palpations: Abdomen is soft.      Tenderness: There is abdominal tenderness. There is no guarding or rebound.   Musculoskeletal:         General: Normal range of motion.   Skin:     General: Skin is warm.   Neurological:      Mental Status: She is oriented to person, place, and time.   Psychiatric:         Mood and Affect: Mood normal.         Behavior: Behavior normal.         Fluids    Intake/Output Summary (Last 24 hours) at  1/4/2025 1514  Last data filed at 1/4/2025 1126  Gross per 24 hour   Intake 770 ml   Output --   Net 770 ml        Laboratory  Recent Labs     01/03/25  0204 01/04/25  0036   WBC 6.0 7.5   RBC 3.57* 3.51*   HEMOGLOBIN 10.1* 10.0*   HEMATOCRIT 32.1* 31.0*   MCV 89.9 88.3   MCH 28.3 28.5   MCHC 31.5* 32.3   RDW 48.4 48.1   PLATELETCT 331 347   MPV 10.4 11.2     Recent Labs     01/02/25  0136 01/03/25  0204 01/04/25  0036   SODIUM 138 137 131*   POTASSIUM 4.2 3.7 4.4   CHLORIDE 102 102 98   CO2 26 25 23   GLUCOSE 115* 102* 197*   BUN 18 16 13   CREATININE 0.43* 0.33* 0.34*   CALCIUM 9.1 9.0 8.8     Recent Labs     01/02/25  1132   INR 1.07               Imaging  [id number]-TUBE   Final Result      Digitized intraoperative radiograph is submitted for review. This examination is not for diagnostic purpose but for guidance during a surgical procedure. Please see the patient's chart for full procedural details.         INTERPRETING LOCATION: [address] [facility], [id number]      CT-PANCREAS AND ABDOMEN WITH & W/O   Final Result         1. Low-density lesion in the tail of the pancreas is stable along with pancreatic ductal dilatation.   2. The areas of soft tissue nodularity along the posterior aspect of the stomach now demonstrate central low density and peripheral enhancement. This may represent pseudocyst or abscess. Necrotic tumor and hematoma would be additional possible    differential considerations.   3. Hepatosplenomegaly.                 Assessment/Plan  * Abdominal pain- (present on admission)  Assessment & Plan  Complex situation for which she has undergone EGD multiple imaging studies and most consistent with intraductal papillary mucinous neoplasm with chronic pancreatic pain.   Diet as tolerated   I have adjusted analgesia today, added scheduled tylenol   s/p ERCP with biopsy, stent insertion, calculus removal, sphincterotomy, bile duct dilation and celiac block   Ongoing pain but unchanged advance  to GI soft diet    Hypotension  Assessment & Plan  Hypotension overnight suspect secondary to poor intake as well as narcotic medications  Patient given 1 L of IV fluids and started on a continuous IV fluid rate at 75 cc an hour with improvement in her blood pressure  I have decreased her Dilaudid from 4 to 2 mg  Encourage oral hydration and intake today  We will hold on further IV fluids she will need to be off prior to discharge    Pancreatitis without necrosis or infection- (present on admission)  Assessment & Plan  Chronic pancreatitis with associated IPMN  OP follow up with hepatobiliary surgery   [place] for consideration of Puestow procedure but needs 6 months of sobriety.  S/p ERCP with biopsy, stent insertion, calculus removal, sphincterotomy, bile duct dilation and celiac block  Ongoing issus with pain management and poor intake     IPMN (intraductal papillary mucinous neoplasm)- (present on admission)  Assessment & Plan  She has been seen by Dr. Byers for consideration of Puestow procedure but needs 6 months of sobriety.  She has not drank in over 1 month.  hepatobiliary surgery has been reconsulted   Pancreatic CT ordered and reviewed  GI following, s/p ERCP with biopsy, stent insertion, calculus removal, sphincterotomy, bile duct dilation and celiac block     Pancreatic duct stones- (present on admission)  Assessment & Plan  Chronic condition  s/p ERCP with biopsy, stent insertion, calculus removal, sphincterotomy, bile duct dilation and celiac block today 1/3  Advance to GI soft diet  Pain control     Normocytic anemia- (present on admission)  Assessment & Plan  Chronic, stable   No signs of bleeding, will trend labs          VTE prophylaxis:    enoxaparin ppx      I have performed a physical exam and reviewed and updated ROS and Plan today (1/4/2025). In review of yesterday's note (1/3/2025), there are no changes except as documented above.

## 2025-01-05 VITALS
TEMPERATURE: 97.5 F | HEIGHT: 63 IN | HEART RATE: 70 BPM | OXYGEN SATURATION: 99 % | DIASTOLIC BLOOD PRESSURE: 74 MMHG | WEIGHT: 104.5 LBS | SYSTOLIC BLOOD PRESSURE: 112 MMHG | BODY MASS INDEX: 18.52 KG/M2 | RESPIRATION RATE: 17 BRPM

## 2025-01-05 LAB
ALBUMIN SERPL BCP-MCNC: 2.8 G/DL (ref 3.2–4.9)
ALBUMIN/GLOB SERPL: 0.7 G/DL
ALP SERPL-CCNC: 95 U/L (ref 30–99)
ALT SERPL-CCNC: 7 U/L (ref 2–50)
ANION GAP SERPL CALC-SCNC: 8 MMOL/L (ref 7–16)
AST SERPL-CCNC: 25 U/L (ref 12–45)
BILIRUB SERPL-MCNC: 0.3 MG/DL (ref 0.1–1.5)
BUN SERPL-MCNC: 10 MG/DL (ref 8–22)
CALCIUM ALBUM COR SERPL-MCNC: 9.7 MG/DL (ref 8.5–10.5)
CALCIUM SERPL-MCNC: 8.7 MG/DL (ref 8.5–10.5)
CHLORIDE SERPL-SCNC: 103 MMOL/L (ref 96–112)
CO2 SERPL-SCNC: 23 MMOL/L (ref 20–33)
CREAT SERPL-MCNC: 0.37 MG/DL (ref 0.5–1.4)
GFR SERPLBLD CREATININE-BSD FMLA CKD-EPI: 129 ML/MIN/1.73 M 2
GLOBULIN SER CALC-MCNC: 3.8 G/DL (ref 1.9–3.5)
GLUCOSE SERPL-MCNC: 117 MG/DL (ref 65–99)
POTASSIUM SERPL-SCNC: 3.9 MMOL/L (ref 3.6–5.5)
PROT SERPL-MCNC: 6.6 G/DL (ref 6–8.2)
SODIUM SERPL-SCNC: 134 MMOL/L (ref 135–145)

## 2025-01-05 PROCEDURE — 99239 HOSP IP/OBS DSCHRG MGMT >30: CPT | Performed by: STUDENT IN AN ORGANIZED HEALTH CARE EDUCATION/TRAINING PROGRAM

## 2025-01-05 PROCEDURE — A9270 NON-COVERED ITEM OR SERVICE: HCPCS | Performed by: STUDENT IN AN ORGANIZED HEALTH CARE EDUCATION/TRAINING PROGRAM

## 2025-01-05 PROCEDURE — A9270 NON-COVERED ITEM OR SERVICE: HCPCS | Performed by: HOSPITALIST

## 2025-01-05 PROCEDURE — 36415 COLL VENOUS BLD VENIPUNCTURE: CPT

## 2025-01-05 PROCEDURE — 80053 COMPREHEN METABOLIC PANEL: CPT

## 2025-01-05 PROCEDURE — 700102 HCHG RX REV CODE 250 W/ 637 OVERRIDE(OP): Performed by: HOSPITALIST

## 2025-01-05 PROCEDURE — 700102 HCHG RX REV CODE 250 W/ 637 OVERRIDE(OP): Performed by: STUDENT IN AN ORGANIZED HEALTH CARE EDUCATION/TRAINING PROGRAM

## 2025-01-05 RX ORDER — OXYCODONE HYDROCHLORIDE 10 MG/1
10 TABLET ORAL EVERY 6 HOURS PRN
Qty: 40 TABLET | Refills: 0 | Status: ON HOLD | OUTPATIENT
Start: 2025-01-05 | End: 2025-01-10

## 2025-01-05 RX ORDER — ACETAMINOPHEN 500 MG
1000 TABLET ORAL 3 TIMES DAILY
Qty: 30 TABLET | Refills: 0 | Status: SHIPPED | OUTPATIENT
Start: 2025-01-05

## 2025-01-05 RX ORDER — ONDANSETRON 4 MG/1
4 TABLET, ORALLY DISINTEGRATING ORAL EVERY 4 HOURS PRN
Qty: 10 TABLET | Refills: 0 | Status: ON HOLD | OUTPATIENT
Start: 2025-01-05 | End: 2025-01-10

## 2025-01-05 RX ADMIN — Medication 100 MG: at 05:53

## 2025-01-05 RX ADMIN — OMEPRAZOLE 20 MG: 20 CAPSULE, DELAYED RELEASE ORAL at 05:53

## 2025-01-05 RX ADMIN — FOLIC ACID 1 MG: 1 TABLET ORAL at 05:53

## 2025-01-05 RX ADMIN — HYDROCORTISONE: 1 CREAM TOPICAL at 05:56

## 2025-01-05 RX ADMIN — DULOXETINE HYDROCHLORIDE 60 MG: 30 CAPSULE, DELAYED RELEASE ORAL at 05:53

## 2025-01-05 RX ADMIN — HYDROMORPHONE HYDROCHLORIDE 2 MG: 2 TABLET ORAL at 07:58

## 2025-01-05 ASSESSMENT — PAIN DESCRIPTION - PAIN TYPE
TYPE: ACUTE PAIN
TYPE: ACUTE PAIN

## 2025-01-05 NOTE — DISCHARGE INSTRUCTIONS
You will need to follow up with surgery Dr. Gunter as scheduled. You will also need to follow up with your GI specialist as digestive health, I would call to schedule as appointments can be further out.   Small more frequent meals, may be easier than large meals   It is important that you stay hydrated   It is imperative that you avoid all alcohol and drugs   You have been given 10 days of pain medication, you should be able to taper off these as you have underwent a block to help with pain surgically. Do not take these medication while drinking alcohol or drive after taking

## 2025-01-05 NOTE — CARE PLAN
The patient is Stable - Low risk of patient condition declining or worsening    Shift Goals  Clinical Goals: Paient will maintain pain level of 3 and below during course of shift with PRN pain medications as per ordered and rest  Patient Goals: pain, surgury, rest  Family Goals: IRINEO    Progress made toward(s) clinical / shift goals:      Problem: Knowledge Deficit - Standard  Goal: Patient and family/care givers will demonstrate understanding of plan of care, disease process/condition, diagnostic tests and medications  Outcome: Progressing    Patient educated on plan of care, medications, and procedures. Patient is Aox4. Patient educated that they are on a clear liquid diet post surgery. Patient verbalizes understanding of care. Will continue to update patient on Plan of care during shift.     Problem: Pain - Standard  Goal: Alleviation of pain or a reduction in pain to the patient’s comfort goal  Outcome: Progressing    Patient had 7/10 pain during course of shift in abdomen. Patient pain managed with q 4 hr dilaudid and q 4 hr oxycodone as per ordered in MAR.     Problem: Fall Risk  Goal: Patient will remain free from falls  Outcome: Progressing    Patient remained free of falls during course of shift. Patient is up self to bathroom. Patient is low fall risk.         
The patient is Stable - Low risk of patient condition declining or worsening    Shift Goals  Clinical Goals: Patient will maintain pain level of less than 5 during shift.  Patient Goals: rest, pain, advance diet  Family Goals: IRINEO    Progress made toward(s) clinical / shift goals:      Problem: Knowledge Deficit - Standard  Goal: Patient and family/care givers will demonstrate understanding of plan of care, disease process/condition, diagnostic tests and medications  Outcome: Progressing    Patient educated on plan of care, medications, and procedures. Patient is Aox4. Patient verbalizes understanding of care. Will continue to update patient on Plan of care during shift.     Problem: Pain - Standard  Goal: Alleviation of pain or a reduction in pain to the patient’s comfort goal  Outcome: Progressing    Patient hd 7/10 pain in abdomen during shift. Patient pain managed with rest, position, and PRN 2mg dilaudid. Patient /65 when awake and 112 systolic post medications. Patient RR range from 14-16. Patient educated pain medications and side of effects. Patient verbalized understanding.     Problem: Nutrition  Goal: Patient's nutritional and fluid intake will be adequate or improve  Outcome: Progressing    Patient able to tolerate full liquid diet. Patient diet advanced to GI soft. Patient educated on new diet. Patient has abdominal discomfort and tenderness. Patient reports no N/V.     
The patient is Stable - Low risk of patient condition declining or worsening    Shift Goals  Clinical Goals: Patient will receive PRN pain medication as needed to achieve her comfort goal of  6/10.  Patient Goals: Patient will be able to sleep comfortably throughout the night.  Family Goals: IRINEO    Progress made toward(s) clinical / shift goals:      Problem: Pain - Standard  Goal: Alleviation of pain or a reduction in pain to the patient’s comfort goal  Outcome: Progressing   Not met, pt reporting pain higher than her comfort goal    Problem: Knowledge Deficit - Standard  Goal: Patient and family/care givers will demonstrate understanding of plan of care, disease process/condition, diagnostic tests and medications  Outcome: Progressing     Problem: Fall Risk  Goal: Patient will remain free from falls  Outcome: Progressing       Patient is not progressing towards the following goals:      
The patient is Stable - Low risk of patient condition declining or worsening    Shift Goals  Clinical Goals: Patient will receive PRN pain medication as needed to achieve her comfort goal of 6/10.  Patient Goals: Patient will be able to sleep comfortably throughout the night.    Progress made toward(s) clinical / shift goals:      Problem: Pain - Standard  Goal: Alleviation of pain or a reduction in pain to the patient’s comfort goal  Outcome: Progressing   Not met, pt reporting pain level higher than her comfort goal even after interventions    Problem: Knowledge Deficit - Standard  Goal: Patient and family/care givers will demonstrate understanding of plan of care, disease process/condition, diagnostic tests and medications  Outcome: Progressing     Problem: Fall Risk  Goal: Patient will remain free from falls  Outcome: Progressing       Patient is not progressing towards the following goals:      
The patient is Stable - Low risk of patient condition declining or worsening    Shift Goals  Clinical Goals: Patient will receive PRN pain medication as needed to achieve her comfort goal of 6/10. Patient will be NPO after midnight.  Patient Goals: Patient will receive pain medication as needed to achieve her comfort goal of 6/10.    Progress made toward(s) clinical / shift goals:      Problem: Pain - Standard  Goal: Alleviation of pain or a reduction in pain to the patient’s comfort goal  Outcome: Progressing     Problem: Knowledge Deficit - Standard  Goal: Patient and family/care givers will demonstrate understanding of plan of care, disease process/condition, diagnostic tests and medications  Outcome: Progressing     Problem: Fall Risk  Goal: Patient will remain free from falls  Outcome: Progressing       Patient is not progressing towards the following goals:      
The patient is Stable - Low risk of patient condition declining or worsening    Shift Goals  Clinical Goals: Pt will receive PRN pain medications to control pain throughout shift and will be NPO at midnight for procedure  Patient Goals: comfort, food, pain control  Family Goals: IRINEO    Progress made toward(s) clinical / shift goals:    Problem: Pain - Standard  Goal: Alleviation of pain or a reduction in pain to the patient’s comfort goal  Outcome: Progressing  Flowsheets  Taken 1/2/2025 1136  Non Verbal Scale:   Calm   Sleeping   Unlabored Breathing  OB Pain Intervention:   Medication - See MAR   Food   Rest  Taken 1/2/2025 1125  Pain Rating Scale (NPRS): 9  Note: Patients pain has remained controlled throughout shift through pharmacological and nonpharmacological methods of pain management. Patients pain will continue to be assessed throughout shift and controlled appropriately.      Problem: Knowledge Deficit - Standard  Goal: Patient and family/care givers will demonstrate understanding of plan of care, disease process/condition, diagnostic tests and medications  Outcome: Progressing  Note: Patient has remained involved in their plan of care with all questions and concerns addressed at this time. Patient will continue to be involved in their plan of care throughout hospital stay.      Problem: Fall Risk  Goal: Patient will remain free from falls  Outcome: Progressing  Note: Patient has remained free of falls throughout shift. Patient has proper fall preventions and protocols in place at this time. Patients fall risk will continue to be assessed each shift. Patient will continue to remain free of falls and will call appropriately.        Patient is not progressing towards the following goals:      
The patient is Stable - Low risk of patient condition declining or worsening    Shift Goals  Clinical Goals: Pt will report a pain level of 5/10 or less  Patient Goals: Pain management  Family Goals: IRINEO    Progress made toward(s) clinical / shift goals:    Problem: Pain - Standard  Goal: Alleviation of pain or a reduction in pain to the patient’s comfort goal  Outcome: Progressing  Note: Pt stated 9/10 pain at the beginning of shift. Pt received pain medication per MAR and was able to rest comfortably. This morning pain was relieved with hydromorphone and heat packs per pt.      Problem: Knowledge Deficit - Standard  Goal: Patient and family/care givers will demonstrate understanding of plan of care, disease process/condition, diagnostic tests and medications  Outcome: Progressing  Note: Pt states understanding of NPO diet before procedure on 1/1. Pt states understanding of medications, POC, and to be cautious with IV site to not accidentally remove again.        Patient is not progressing towards the following goals:      
The patient is Stable - Low risk of patient condition declining or worsening    Shift Goals  Clinical Goals: pain management, comfort, rest, remain free from falls  Patient Goals: pain management, comfort, rest  Family Goals: IRINEO    Progress made toward(s) clinical / shift goals:       Problem: Pain - Standard  Goal: Alleviation of pain or a reduction in pain to the patient’s comfort goal  Outcome: Progressing     Problem: Knowledge Deficit - Standard  Goal: Patient and family/care givers will demonstrate understanding of plan of care, disease process/condition, diagnostic tests and medications  Outcome: Progressing     Problem: Fall Risk  Goal: Patient will remain free from falls  Outcome: Progressing       Patient is not progressing towards the following goals:      
The patient is Stable - Low risk of patient condition declining or worsening    Shift Goals  Clinical Goals: pain reduced to 5/10 (comfort llevel)  Patient Goals: rest and comfort  Family Goals: IRINEO    Progress made toward(s) clinical / shift goals:    Problem: Pain - Standard  Goal: Alleviation of pain or a reduction in pain to the patient’s comfort goal  Outcome: Progressing       Patient is not progressing towards the following goals:      
The patient is Stable - Low risk of patient condition declining or worsening    Shift Goals  Clinical Goals: pain/nausea management, tolerate diet  Patient Goals: comfort  Family Goals: IRINEO    Progress made toward(s) clinical / shift goals:  Patient A&Ox4. Complains of severe abdominal pain and nausea. PRN medication administered per orders with minimal improvement. MD aware. Scheduled tylenol added. Patient eating <25% of meals. Plan for ERCP Friday.      Problem: Pain - Standard  Goal: Alleviation of pain or a reduction in pain to the patient’s comfort goal  Outcome: Progressing     Problem: Knowledge Deficit - Standard  Goal: Patient and family/care givers will demonstrate understanding of plan of care, disease process/condition, diagnostic tests and medications  Outcome: Progressing     Problem: Fall Risk  Goal: Patient will remain free from falls  Outcome: Progressing       Patient is not progressing towards the following goals:      
The patient is Watcher - Medium risk of patient condition declining or worsening    Shift Goals  Clinical Goals: Patient will have pain controlled and tolerate full liquid diet throughout the shift  Patient Goals: Patient will have pain controlled and rest comfortably throughout the shift  Family Goals: none present    Progress made toward(s) clinical / shift goals:      Problem: Knowledge Deficit - Standard  Goal: Patient and family/care givers will demonstrate understanding of plan of care, disease process/condition, diagnostic tests and medications  Outcome: Progressing   Patient oriented and able to understand plan of care. Patient understands diet instructions and importance of calling for assistance when needed.     Problem: Fall Risk  Goal: Patient will remain free from falls  Outcome: Progressing   Patient educated on fall prevention and demonstrates understanding. Bed locked and in lowest position. Call light and patient belongings within reach.  Patient calling appropriately for assistance.     Patient is not progressing towards the following goals:    Problem: Pain - Standard  Goal: Alleviation of pain or a reduction in pain to the patient’s comfort goal  Outcome: Not Progressing   Patient medicated per MAR for pain. Heat packs in place to help with pain. Patient states little improvement in pain with interventions.   
Quality 110: Preventive Care And Screening: Influenza Immunization: Influenza Immunization Administered during Influenza season
Quality 431: Preventive Care And Screening: Unhealthy Alcohol Use - Screening: Patient not identified as an unhealthy alcohol user when screened for unhealthy alcohol use using a systematic screening method
Detail Level: Detailed
Quality 111:Pneumonia Vaccination Status For Older Adults: Pneumococcal vaccine administered on or after patient’s 60th birthday and before the end of the measurement period
Quality 226: Preventive Care And Screening: Tobacco Use: Screening And Cessation Intervention: Patient screened for tobacco use and is an ex/non-smoker
Quality 130: Documentation Of Current Medications In The Medical Record: Current Medications Documented

## 2025-01-05 NOTE — DISCHARGE SUMMARY
Discharge Summary    CHIEF COMPLAINT ON ADMISSION  Chief Complaint   Patient presents with    Abdominal Pain     Transfer from [facility]. Patient w/ hx of pancreatitis. Recent diagnosis of pancreatic duct stones. Patient had surgery schedule in mid January, but pain has become to severe.        Reason for Admission  ems     Admission Date  12/30/2024    CODE STATUS  Full Code    HPI & HOSPITAL COURSE    Brenda Cartagena is a 39 y.o. female with a  past medical history of alcohol dependence and methamphetamine abuse last use was over 1 month ago who has had multiple admissions due to abdominal pain and pancreatitis.  In October she underwent EGD with gastritis and MRCP revealing pancreatic ductal dilation with stone and debris and a cystic lesion consistent with intraductal papillary mucinous neoplasm She subsequently underwent endoscopic ultrasound by Dr. Dawson gastroenterology revealing a stone lodged in the genu of the pancreas that was not able to be removed.  She stopped drinking and using methamphetamine and was evaluated by Dr. Byers hepatobiliary surgery.  She felt that her condition could be consistent with intraductal papillary mucinous neoplasm and recommended possible [place] procedure to relieve the chronic pancreatitis but noted that she must have 6 months of complete sobriety from alcohol first.  She was discharged on 12/23/2024 with 5 days worth of oxycodone.  She ran out of the medications and in [place] there are no primary care providers taking new patients therefore presented to [facility] with a day and a half of severe abdominal pain and vomiting.  Lipase is normal labs are unremarkable. Hepatobiliary surgery was again consulted as was GI.  In the meantime she will be given IV fluids, pain control.   Patient underwent EGD/EUS with biopsy, celiac plexus block, ERCP with bile duct stent, clot oculus removal and sphincterotomy as well as dilation of bile duct stricture.  She did  well postprocedure.  Her diet was advanced.  She did have ongoing pain and she was given a short course of pain medications with the hope that with her celiac block her pain should resolve.  Again she was educated on importance of complete cessation of all alcohol and drugs and to follow-up with hepatobiliary surgery for her ongoing care.      Therefore, she is discharged in fair and stable condition to home with close outpatient follow-up.    The patient met 2-midnight criteria for an inpatient stay at the time of discharge.    Discharge Date  1/5/2025    FOLLOW UP ITEMS POST DISCHARGE  Follow-up with out of biliary surgery and outpatient GI regarding her chronic pancreatitis and chronically retained stones  Pain control  Nutritional status    DISCHARGE DIAGNOSES  Principal Problem:    Abdominal pain (POA: Yes)  Active Problems:    Normocytic anemia (POA: Yes)    Pancreatic duct stones (POA: Yes)    IPMN (intraductal papillary mucinous neoplasm) (POA: Yes)    Pancreatitis without necrosis or infection (POA: Yes)    Hypotension (POA: Unknown)  Resolved Problems:    * No resolved hospital problems. *      FOLLOW UP  Future Appointments   Date Time Provider Department Center   1/21/2025  3:00 PM Jazmny Byers M.D. [facility] None     Jazmyn Byers M.D.  [address]  [facility] [address]  [facility] [id number] [phone]    Schedule an appointment as soon as possible for a visit in 2 week(s)  If symptoms worsen    Christian German D.O.  [address] [address]  [phone]    Schedule an appointment as soon as possible for a visit in 1 week(s)        MEDICATIONS ON DISCHARGE     Medication List        START taking these medications        Instructions   acetaminophen 500 MG Tabs  Commonly known as: Tylenol   Take 2 Tablets by mouth in the morning, at noon, and at bedtime.  Dose: 1,000 mg     ondansetron 4 MG Tbdp  Commonly known as: Zofran ODT   Take 1 Tablet by mouth every four hours as needed for  Nausea/Vomiting (give PO if no IV route available).  Dose: 4 mg            CHANGE how you take these medications        Instructions   oxyCODONE immediate release 10 MG immediate release tablet  What changed:   reasons to take this  additional instructions  Commonly known as: Roxicodone   Take 1 Tablet by mouth every 6 hours as needed for Severe Pain for up to 10 days.  Dose: 10 mg            CONTINUE taking these medications        Instructions   docusate sodium 100 MG Caps  Commonly known as: Colace   Take 1 Capsule by mouth 2 times a day. To prevent constipation while taking narcotic pain medications.  Dose: 100 mg     DULoxetine 60 MG Cpep delayed-release capsule  Commonly known as: Cymbalta   Take 1 Capsule by mouth every day.  Dose: 60 mg     folic acid 1 MG Tabs  Commonly known as: Folvite   Take 1 mg by mouth every day.  Dose: 1 mg     multivitamin Tabs   Take 1 Tablet by mouth every day.  Dose: 1 Tablet     Naloxone 4 MG/0.1ML Liqd  Commonly known as: Narcan   Administer 4 mg into affected nostril(S) one time as needed (somulence due to narcotic overdose) for up to 1 dose.  Dose: 1 Spray     omeprazole 20 MG delayed-release capsule  Commonly known as: PriLOSEC   Take 1 Capsule by mouth 2 times a day.  Dose: 20 mg     polyethylene glycol/lytes Pack  Commonly known as: Miralax   Take 1 Packet by mouth 2 times a day. To prevent constipation while taking narcotic pain medications.  Dose: 17 g     thiamine 100 MG tablet  Commonly known as: Thiamine   Take 100 mg by mouth 2 times a day.  Dose: 100 mg            STOP taking these medications      HYDROmorphone 4 MG Tabs  Commonly known as: Dilaudid              Allergies  Allergies   Allergen Reactions    Peanut-Derived Swelling     Tongue swelling    Other Food Unspecified     Allergy to walnuts and almonds    Tree Nuts Food Allergy Swelling     All nuts        DIET  Orders Placed This Encounter   Procedures    Diet Order Diet: Low Fiber(GI Soft)     Standing  Status:   Standing     Number of Occurrences:   1     Order Specific Question:   Diet:     Answer:   Low Fiber(GI Soft) [2]       ACTIVITY  As tolerated.      CONSULTATIONS  Hepatobiliary surgery  Gastroenterology    PROCEDURES  1/3/25  EGD, WITH BIOPSY, WITH ENDOSCOPIC US-GUIDED, CELIAC PLEXUS BLOCK,   ERCP (ENDOSCOPIC RETROGRADE CHOLANGIOPANCREATOGRAPHY)   INSERTION, STENT, BILE DUCT   ERCP, WITH CALCULUS REMOVAL   SPHINCTEROTOMY   DILATION, STRICTURE, BILE DUCT             LABORATORY  Lab Results   Component Value Date    SODIUM 134 (L) 01/05/2025    POTASSIUM 3.9 01/05/2025    CHLORIDE 103 01/05/2025    CO2 23 01/05/2025    GLUCOSE 117 (H) 01/05/2025    BUN 10 01/05/2025    CREATININE 0.37 (L) 01/05/2025        Lab Results   Component Value Date    WBC 7.5 01/04/2025    HEMOGLOBIN 10.0 (L) 01/04/2025    HEMATOCRIT 31.0 (L) 01/04/2025    PLATELETCT 347 01/04/2025        Total time of the discharge process exceeds 32 minutes.

## (undated) DEVICE — SYRINGE DISP. 60 CC LL - (30/BX, 12BX/CA)**WHEN THESE ARE GONE ORDER #500206**

## (undated) DEVICE — KIT CUSTOM PROCEDURE SINGLE FOR ENDO (15/CA)

## (undated) DEVICE — ELECTRODE DUAL RETURN W/ CORD - (50/PK)

## (undated) DEVICE — SET EXTENSION WITH 2 PORTS (48EA/CA) ***PART #2C8610 IS A SUBSTITUTE*****

## (undated) DEVICE — BLOCK BITE MAXI DENTAL RETENTION RIM (100EA/BX)

## (undated) DEVICE — GOWN SURGEONS LARGE - (32/CA)

## (undated) DEVICE — TUBING CLEARLINK DUO-VENT - C-FLO (48EA/CA)

## (undated) DEVICE — SLEEVE VASO DVT COMPRESSION CALF MED - (10PR/CA)

## (undated) DEVICE — MASK WITH FACE SHIELD (25/BX 4BX/CA)

## (undated) DEVICE — LACTATED RINGERS INJ 1000 ML - (14EA/CA 60CA/PF)

## (undated) DEVICE — PORT AUXILLARY WATER (50EA/BX)

## (undated) DEVICE — SET LEADWIRE 5 LEAD BEDSIDE DISPOSABLE ECG (1SET OF 5/EA)

## (undated) DEVICE — SYRINGE 10 ML CONTROL LL (25EA/BX 4BX/CA)

## (undated) DEVICE — GUIDEWARE ENDOSCOPIC JAGWIRE REVOLUTION RX 39 SPHINCTEROTOME (1EA)

## (undated) DEVICE — MASK OXYGEN VNYL ADLT MED CONC WITH 7 FOOT TUBING - (50EA/CA)

## (undated) DEVICE — NEPTUNE 4 PORT MANIFOLD - (20/PK)

## (undated) DEVICE — SYRINGE 50 ML LL (40EA/BX 4BX/CA)

## (undated) DEVICE — SPONGE GAUZE NON-STERILE 4X4 - (2000/CA 10PK/CA)

## (undated) DEVICE — SODIUM CHL IRRIGATION 0.9% 1000ML (12EA/CA)

## (undated) DEVICE — KIT ANESTHESIA W/CIRCUIT & 3/LT BAG W/FILTER (20EA/CA)

## (undated) DEVICE — GOWN WARMING STANDARD FLEX - (30/CA)

## (undated) DEVICE — Device

## (undated) DEVICE — KNIFE RX NEEDLE

## (undated) DEVICE — TOWEL STOP TIMEOUT SAFETY FLAG (40EA/CA)

## (undated) DEVICE — FORCEP RADIAL JAW 4 STANDARD CAPACITY W/NEEDLE 240CM (40EA/BX)

## (undated) DEVICE — TUBE SUCTION YANKAUER 1/4 X 6FT (50EA/CA)"

## (undated) DEVICE — MASK O2 VNYL ADLT RBRTH HI - (50/CS)

## (undated) DEVICE — ELECTRODE 850 FOAM ADHESIVE - HYDROGEL RADIOTRNSPRNT (50/PK)

## (undated) DEVICE — CANISTER SUCTION 3000ML MECHANICAL FILTER AUTO SHUTOFF MEDI-VAC NONSTERILE LF DISP (40EA/CA)

## (undated) DEVICE — BLOCK BITE ENDOSCOPIC 2809 - (100/BX) INTERMEDIATE

## (undated) DEVICE — CANNULA O2 COMFORT SOFT EAR ADULT 7 FT TUBING (50/CA)

## (undated) DEVICE — COVER ENDOSCOPE DISTAL SINGLE USE (20EA/BX)

## (undated) DEVICE — SYRINGE SAFETY 5 ML 18 GA X 1-1/2 BLUNT LL (100/BX 4BX/CA)

## (undated) DEVICE — BUTTON ENDOSCOPY DISPOSABLE

## (undated) DEVICE — FILM CASSETTE ENDO

## (undated) DEVICE — SUCTION INSTRUMENT YANKAUER BULBOUS TIP W/O VENT (50EA/CA)

## (undated) DEVICE — SENSOR OXIMETER ADULT SPO2 RD SET (20EA/BX)

## (undated) DEVICE — WATER IRRIGATION STERILE 1000ML (12EA/CA)

## (undated) DEVICE — DEVICE BIOPSY RX BILIARY SYSTEM CAP (10EA/BX)

## (undated) DEVICE — KIT  I.V. START (100EA/CA)

## (undated) DEVICE — TUBE CONNECTING SUCTION - CLEAR PLASTIC STERILE 72 IN (50EA/CA)

## (undated) DEVICE — CANISTER SUCTION RIGID RED 1500CC (40EA/CA)

## (undated) DEVICE — BALLOON RETRIEVAL EXTRACTOR PRO RX 9-12MM

## (undated) DEVICE — SOD. CHL. INJ. 0.9% 1000 ML - (14EA/CA 60CA/PF)

## (undated) DEVICE — TUBE E-T HI-LO CUFF 6.5MM (10EA/BX)

## (undated) DEVICE — CUFF BP ADULT LARGE DISPOSABLE (20EA/CA)

## (undated) DEVICE — CATHETER IV SAFETY 20 GA X 1-1/4 (50/BX)